# Patient Record
Sex: FEMALE | Race: WHITE | Employment: OTHER | URBAN - METROPOLITAN AREA
[De-identification: names, ages, dates, MRNs, and addresses within clinical notes are randomized per-mention and may not be internally consistent; named-entity substitution may affect disease eponyms.]

---

## 2017-12-26 ENCOUNTER — HOSPITAL ENCOUNTER (OUTPATIENT)
Dept: WOMENS IMAGING | Age: 62
Discharge: HOME | End: 2017-12-26
Payer: COMMERCIAL

## 2017-12-26 DIAGNOSIS — M81.0 AGE-RELATED OSTEOPOROSIS WITHOUT CURRENT PATHOLOGICAL FRACTURE: ICD-10-CM

## 2017-12-26 DIAGNOSIS — Z12.31 VISIT FOR SCREENING MAMMOGRAM: ICD-10-CM

## 2017-12-26 PROCEDURE — 77080 DXA BONE DENSITY AXIAL: CPT

## 2017-12-26 PROCEDURE — 77063 BREAST TOMOSYNTHESIS BI: CPT

## 2018-12-28 ENCOUNTER — HOSPITAL ENCOUNTER (OUTPATIENT)
Dept: WOMENS IMAGING | Age: 63
Discharge: HOME | End: 2018-12-30
Payer: COMMERCIAL

## 2018-12-28 DIAGNOSIS — Z12.31 VISIT FOR SCREENING MAMMOGRAM: ICD-10-CM

## 2018-12-28 PROCEDURE — 77063 BREAST TOMOSYNTHESIS BI: CPT

## 2019-03-26 ENCOUNTER — HOSPITAL ENCOUNTER (OUTPATIENT)
Dept: WOMENS IMAGING | Age: 64
Discharge: HOME | End: 2019-03-28
Payer: COMMERCIAL

## 2019-03-26 DIAGNOSIS — Z78.0 POST-MENOPAUSAL: ICD-10-CM

## 2019-03-26 PROCEDURE — 77080 DXA BONE DENSITY AXIAL: CPT

## 2020-01-06 ENCOUNTER — HOSPITAL ENCOUNTER (OUTPATIENT)
Dept: WOMENS IMAGING | Age: 65
Discharge: HOME | End: 2020-01-08
Payer: COMMERCIAL

## 2020-01-06 PROCEDURE — 77063 BREAST TOMOSYNTHESIS BI: CPT

## 2020-01-13 ENCOUNTER — HOSPITAL ENCOUNTER (OUTPATIENT)
Dept: WOMENS IMAGING | Age: 65
Discharge: HOME | End: 2020-01-15
Payer: COMMERCIAL

## 2020-01-13 PROCEDURE — G0279 TOMOSYNTHESIS, MAMMO: HCPCS

## 2020-07-13 ENCOUNTER — HOSPITAL ENCOUNTER (OUTPATIENT)
Dept: WOMENS IMAGING | Age: 65
Discharge: HOME | End: 2020-07-15
Payer: COMMERCIAL

## 2020-07-13 PROCEDURE — G0279 TOMOSYNTHESIS, MAMMO: HCPCS

## 2021-01-18 ENCOUNTER — HOSPITAL ENCOUNTER (OUTPATIENT)
Dept: WOMENS IMAGING | Age: 66
End: 2021-01-18
Payer: COMMERCIAL

## 2021-01-18 ENCOUNTER — HOSPITAL ENCOUNTER (OUTPATIENT)
Dept: WOMENS IMAGING | Age: 66
Discharge: HOME | End: 2021-01-20
Payer: COMMERCIAL

## 2021-01-18 DIAGNOSIS — R92.8 ABNORMAL MAMMOGRAM: ICD-10-CM

## 2021-01-18 PROCEDURE — G0279 TOMOSYNTHESIS, MAMMO: HCPCS

## 2021-03-15 ENCOUNTER — HOSPITAL ENCOUNTER (OUTPATIENT)
Dept: WOMENS IMAGING | Age: 66
Discharge: HOME | End: 2021-03-17
Payer: COMMERCIAL

## 2021-03-15 DIAGNOSIS — M81.0 OSTEOPOROSIS WITHOUT CURRENT PATHOLOGICAL FRACTURE, UNSPECIFIED OSTEOPOROSIS TYPE: ICD-10-CM

## 2021-03-15 PROCEDURE — 77080 DXA BONE DENSITY AXIAL: CPT

## 2022-02-01 ENCOUNTER — HOSPITAL ENCOUNTER (OUTPATIENT)
Dept: WOMENS IMAGING | Age: 67
Discharge: HOME | End: 2022-02-03
Payer: MEDICARE

## 2022-02-01 DIAGNOSIS — Z12.31 ENCOUNTER FOR SCREENING MAMMOGRAM FOR MALIGNANT NEOPLASM OF BREAST: ICD-10-CM

## 2022-02-01 PROCEDURE — 77063 BREAST TOMOSYNTHESIS BI: CPT

## 2023-02-07 ENCOUNTER — HOSPITAL ENCOUNTER (OUTPATIENT)
Dept: WOMENS IMAGING | Age: 68
Discharge: HOME | End: 2023-02-09
Payer: MEDICARE

## 2023-02-07 DIAGNOSIS — Z12.31 ENCOUNTER FOR SCREENING MAMMOGRAM FOR MALIGNANT NEOPLASM OF BREAST: ICD-10-CM

## 2023-02-07 PROCEDURE — 77067 SCR MAMMO BI INCL CAD: CPT

## 2023-04-25 ENCOUNTER — TRANSCRIBE ORDERS (OUTPATIENT)
Dept: ADMINISTRATIVE | Age: 68
End: 2023-04-25

## 2023-04-25 DIAGNOSIS — M81.0 AGE RELATED OSTEOPOROSIS, UNSPECIFIED PATHOLOGICAL FRACTURE PRESENCE: Primary | ICD-10-CM

## 2023-05-22 ENCOUNTER — HOSPITAL ENCOUNTER (OUTPATIENT)
Dept: WOMENS IMAGING | Age: 68
Discharge: HOME | End: 2023-05-24
Payer: MEDICARE

## 2023-05-22 DIAGNOSIS — M81.0 AGE RELATED OSTEOPOROSIS, UNSPECIFIED PATHOLOGICAL FRACTURE PRESENCE: ICD-10-CM

## 2023-05-22 PROCEDURE — 77080 DXA BONE DENSITY AXIAL: CPT

## 2024-02-08 ENCOUNTER — HOSPITAL ENCOUNTER (OUTPATIENT)
Dept: WOMENS IMAGING | Age: 69
Discharge: HOME | End: 2024-02-10
Payer: MEDICARE

## 2024-02-08 DIAGNOSIS — Z12.31 ENCOUNTER FOR SCREENING MAMMOGRAM FOR BREAST CANCER: ICD-10-CM

## 2024-02-08 PROCEDURE — 77063 BREAST TOMOSYNTHESIS BI: CPT

## 2024-04-07 ENCOUNTER — APPOINTMENT (OUTPATIENT)
Dept: GENERAL RADIOLOGY | Age: 69
End: 2024-04-07
Payer: MEDICARE

## 2024-04-07 ENCOUNTER — HOSPITAL ENCOUNTER (INPATIENT)
Age: 69
LOS: 6 days | Discharge: NURSING, REHAB OR LONG-TERM CARE | End: 2024-04-13
Attending: EMERGENCY MEDICINE | Admitting: SURGERY
Payer: MEDICARE

## 2024-04-07 DIAGNOSIS — S72.92XA CLOSED FRACTURE OF LEFT FEMUR, UNSPECIFIED FRACTURE MORPHOLOGY, UNSPECIFIED PORTION OF FEMUR, INITIAL ENCOUNTER (HCC): Primary | ICD-10-CM

## 2024-04-07 PROBLEM — S72.002A LEFT DISPLACED FEMORAL NECK FRACTURE (HCC): Status: ACTIVE | Noted: 2024-04-07

## 2024-04-07 LAB
ABO + RH BLD: NORMAL
ALBUMIN SERPL-MCNC: 3.9 G/DL (ref 3.5–5.2)
ALBUMIN/GLOB SERPL: 2 {RATIO} (ref 1–2.5)
ALP SERPL-CCNC: 48 U/L (ref 35–104)
ALT SERPL-CCNC: 33 U/L (ref 10–35)
ANION GAP SERPL CALCULATED.3IONS-SCNC: 12 MMOL/L (ref 9–16)
ARM BAND NUMBER: NORMAL
AST SERPL-CCNC: 35 U/L (ref 10–35)
BACTERIA URNS QL MICRO: ABNORMAL
BASOPHILS # BLD: 0.03 K/UL (ref 0–0.2)
BASOPHILS NFR BLD: 0 % (ref 0–2)
BILIRUB SERPL-MCNC: 0.6 MG/DL (ref 0–1.2)
BILIRUB UR QL STRIP: NEGATIVE
BLOOD BANK SAMPLE EXPIRATION: NORMAL
BLOOD GROUP ANTIBODIES SERPL: NEGATIVE
BUN SERPL-MCNC: 26 MG/DL (ref 8–23)
CALCIUM SERPL-MCNC: 9 MG/DL (ref 8.6–10.4)
CASTS #/AREA URNS LPF: ABNORMAL /LPF (ref 0–8)
CHLORIDE SERPL-SCNC: 107 MMOL/L (ref 98–107)
CLARITY UR: CLEAR
CO2 SERPL-SCNC: 22 MMOL/L (ref 20–31)
COLOR UR: YELLOW
CREAT SERPL-MCNC: 1 MG/DL (ref 0.5–0.9)
EOSINOPHIL # BLD: 0.15 K/UL (ref 0–0.44)
EOSINOPHILS RELATIVE PERCENT: 2 % (ref 1–4)
EPI CELLS #/AREA URNS HPF: ABNORMAL /HPF (ref 0–5)
ERYTHROCYTE [DISTWIDTH] IN BLOOD BY AUTOMATED COUNT: 12.6 % (ref 11.8–14.4)
GFR SERPL CREATININE-BSD FRML MDRD: 64 ML/MIN/1.73M2
GLUCOSE SERPL-MCNC: 176 MG/DL (ref 74–99)
GLUCOSE UR STRIP-MCNC: NEGATIVE MG/DL
HCT VFR BLD AUTO: 32.9 % (ref 36.3–47.1)
HGB BLD-MCNC: 10.6 G/DL (ref 11.9–15.1)
HGB UR QL STRIP.AUTO: NEGATIVE
IMM GRANULOCYTES # BLD AUTO: 0.03 K/UL (ref 0–0.3)
IMM GRANULOCYTES NFR BLD: 0 %
INR PPP: 1
KETONES UR STRIP-MCNC: ABNORMAL MG/DL
LEUKOCYTE ESTERASE UR QL STRIP: NEGATIVE
LYMPHOCYTES NFR BLD: 1.94 K/UL (ref 1.1–3.7)
LYMPHOCYTES RELATIVE PERCENT: 25 % (ref 24–43)
MCH RBC QN AUTO: 29.2 PG (ref 25.2–33.5)
MCHC RBC AUTO-ENTMCNC: 32.2 G/DL (ref 28.4–34.8)
MCV RBC AUTO: 90.6 FL (ref 82.6–102.9)
MONOCYTES NFR BLD: 0.6 K/UL (ref 0.1–1.2)
MONOCYTES NFR BLD: 8 % (ref 3–12)
NEUTROPHILS NFR BLD: 65 % (ref 36–65)
NEUTS SEG NFR BLD: 5 K/UL (ref 1.5–8.1)
NITRITE UR QL STRIP: NEGATIVE
NRBC BLD-RTO: 0 PER 100 WBC
PARTIAL THROMBOPLASTIN TIME: <20 SEC (ref 23–36.5)
PH UR STRIP: 6 [PH] (ref 5–8)
PLATELET # BLD AUTO: 157 K/UL (ref 138–453)
PMV BLD AUTO: 11.5 FL (ref 8.1–13.5)
POTASSIUM SERPL-SCNC: 4.4 MMOL/L (ref 3.7–5.3)
PROT SERPL-MCNC: 6.4 G/DL (ref 6.6–8.7)
PROT UR STRIP-MCNC: NEGATIVE MG/DL
PROTHROMBIN TIME: 12.7 SEC (ref 11.7–14.9)
RBC # BLD AUTO: 3.63 M/UL (ref 3.95–5.11)
RBC #/AREA URNS HPF: ABNORMAL /HPF (ref 0–4)
SODIUM SERPL-SCNC: 141 MMOL/L (ref 136–145)
SP GR UR STRIP: 1.03 (ref 1–1.03)
UROBILINOGEN UR STRIP-ACNC: NORMAL EU/DL (ref 0–1)
WBC #/AREA URNS HPF: ABNORMAL /HPF (ref 0–5)
WBC OTHER # BLD: 7.8 K/UL (ref 3.5–11.3)

## 2024-04-07 PROCEDURE — 73560 X-RAY EXAM OF KNEE 1 OR 2: CPT

## 2024-04-07 PROCEDURE — 86850 RBC ANTIBODY SCREEN: CPT

## 2024-04-07 PROCEDURE — 73502 X-RAY EXAM HIP UNI 2-3 VIEWS: CPT

## 2024-04-07 PROCEDURE — 1200000000 HC SEMI PRIVATE

## 2024-04-07 PROCEDURE — 6370000000 HC RX 637 (ALT 250 FOR IP): Performed by: STUDENT IN AN ORGANIZED HEALTH CARE EDUCATION/TRAINING PROGRAM

## 2024-04-07 PROCEDURE — 6360000002 HC RX W HCPCS: Performed by: STUDENT IN AN ORGANIZED HEALTH CARE EDUCATION/TRAINING PROGRAM

## 2024-04-07 PROCEDURE — 71045 X-RAY EXAM CHEST 1 VIEW: CPT

## 2024-04-07 PROCEDURE — 86900 BLOOD TYPING SEROLOGIC ABO: CPT

## 2024-04-07 PROCEDURE — 2500000003 HC RX 250 WO HCPCS

## 2024-04-07 PROCEDURE — 85610 PROTHROMBIN TIME: CPT

## 2024-04-07 PROCEDURE — 96374 THER/PROPH/DIAG INJ IV PUSH: CPT

## 2024-04-07 PROCEDURE — 96375 TX/PRO/DX INJ NEW DRUG ADDON: CPT

## 2024-04-07 PROCEDURE — 80053 COMPREHEN METABOLIC PANEL: CPT

## 2024-04-07 PROCEDURE — 85730 THROMBOPLASTIN TIME PARTIAL: CPT

## 2024-04-07 PROCEDURE — 73552 X-RAY EXAM OF FEMUR 2/>: CPT

## 2024-04-07 PROCEDURE — 2580000003 HC RX 258: Performed by: STUDENT IN AN ORGANIZED HEALTH CARE EDUCATION/TRAINING PROGRAM

## 2024-04-07 PROCEDURE — 86901 BLOOD TYPING SEROLOGIC RH(D): CPT

## 2024-04-07 PROCEDURE — 99285 EMERGENCY DEPT VISIT HI MDM: CPT

## 2024-04-07 PROCEDURE — 85025 COMPLETE CBC W/AUTO DIFF WBC: CPT

## 2024-04-07 PROCEDURE — 81001 URINALYSIS AUTO W/SCOPE: CPT

## 2024-04-07 RX ORDER — OXYCODONE HCL 5 MG/5 ML
5 SOLUTION, ORAL ORAL EVERY 6 HOURS PRN
Status: DISCONTINUED | OUTPATIENT
Start: 2024-04-07 | End: 2024-04-13 | Stop reason: HOSPADM

## 2024-04-07 RX ORDER — SODIUM CHLORIDE 9 MG/ML
INJECTION, SOLUTION INTRAVENOUS PRN
Status: DISCONTINUED | OUTPATIENT
Start: 2024-04-07 | End: 2024-04-10

## 2024-04-07 RX ORDER — MORPHINE SULFATE 4 MG/ML
4 INJECTION, SOLUTION INTRAMUSCULAR; INTRAVENOUS ONCE
Status: COMPLETED | OUTPATIENT
Start: 2024-04-07 | End: 2024-04-07

## 2024-04-07 RX ORDER — ONDANSETRON 4 MG/1
4 TABLET, ORALLY DISINTEGRATING ORAL EVERY 8 HOURS PRN
Status: DISCONTINUED | OUTPATIENT
Start: 2024-04-07 | End: 2024-04-13 | Stop reason: HOSPADM

## 2024-04-07 RX ORDER — LISINOPRIL 20 MG/1
40 TABLET ORAL DAILY
Status: DISCONTINUED | OUTPATIENT
Start: 2024-04-08 | End: 2024-04-10

## 2024-04-07 RX ORDER — OXYCODONE HYDROCHLORIDE 5 MG/1
5 TABLET ORAL EVERY 6 HOURS PRN
Status: DISCONTINUED | OUTPATIENT
Start: 2024-04-07 | End: 2024-04-07

## 2024-04-07 RX ORDER — LEVOTHYROXINE SODIUM 0.1 MG/1
100 TABLET ORAL DAILY
Status: DISCONTINUED | OUTPATIENT
Start: 2024-04-08 | End: 2024-04-13 | Stop reason: HOSPADM

## 2024-04-07 RX ORDER — FENTANYL CITRATE 50 UG/ML
50 INJECTION, SOLUTION INTRAMUSCULAR; INTRAVENOUS ONCE
Status: DISCONTINUED | OUTPATIENT
Start: 2024-04-07 | End: 2024-04-07

## 2024-04-07 RX ORDER — ACETAMINOPHEN 500 MG
1000 TABLET ORAL EVERY 8 HOURS SCHEDULED
Status: DISCONTINUED | OUTPATIENT
Start: 2024-04-07 | End: 2024-04-07

## 2024-04-07 RX ORDER — SODIUM CHLORIDE 0.9 % (FLUSH) 0.9 %
5-40 SYRINGE (ML) INJECTION PRN
Status: DISCONTINUED | OUTPATIENT
Start: 2024-04-07 | End: 2024-04-10

## 2024-04-07 RX ORDER — NITROGLYCERIN 0.4 MG/1
0.4 TABLET SUBLINGUAL EVERY 5 MIN PRN
Status: ON HOLD | COMMUNITY

## 2024-04-07 RX ORDER — LEVOTHYROXINE SODIUM 0.1 MG/1
100 TABLET ORAL DAILY
Status: ON HOLD | COMMUNITY
Start: 2016-09-20

## 2024-04-07 RX ORDER — SIMVASTATIN 10 MG
10 TABLET ORAL NIGHTLY
Status: ON HOLD | COMMUNITY
Start: 2012-11-06

## 2024-04-07 RX ORDER — SODIUM CHLORIDE, SODIUM LACTATE, POTASSIUM CHLORIDE, CALCIUM CHLORIDE 600; 310; 30; 20 MG/100ML; MG/100ML; MG/100ML; MG/100ML
INJECTION, SOLUTION INTRAVENOUS CONTINUOUS
Status: DISCONTINUED | OUTPATIENT
Start: 2024-04-08 | End: 2024-04-08

## 2024-04-07 RX ORDER — GABAPENTIN 300 MG/1
300 CAPSULE ORAL 2 TIMES DAILY
Status: ON HOLD | COMMUNITY

## 2024-04-07 RX ORDER — ACETAMINOPHEN 325 MG/1
650 TABLET ORAL EVERY 6 HOURS PRN
Status: ON HOLD | COMMUNITY

## 2024-04-07 RX ORDER — LISINOPRIL 40 MG/1
40 TABLET ORAL DAILY
Status: ON HOLD | COMMUNITY
Start: 2023-01-03

## 2024-04-07 RX ORDER — SODIUM CHLORIDE 0.9 % (FLUSH) 0.9 %
5-40 SYRINGE (ML) INJECTION EVERY 12 HOURS SCHEDULED
Status: DISCONTINUED | OUTPATIENT
Start: 2024-04-07 | End: 2024-04-10

## 2024-04-07 RX ORDER — ATORVASTATIN CALCIUM 20 MG/1
10 TABLET, FILM COATED ORAL NIGHTLY
Status: DISCONTINUED | OUTPATIENT
Start: 2024-04-07 | End: 2024-04-13 | Stop reason: HOSPADM

## 2024-04-07 RX ORDER — ACETAMINOPHEN 160 MG/5ML
1000 LIQUID ORAL EVERY 8 HOURS SCHEDULED
Status: DISCONTINUED | OUTPATIENT
Start: 2024-04-07 | End: 2024-04-08

## 2024-04-07 RX ORDER — LIDOCAINE HYDROCHLORIDE 10 MG/ML
20 INJECTION, SOLUTION INFILTRATION; PERINEURAL ONCE
Status: COMPLETED | OUTPATIENT
Start: 2024-04-07 | End: 2024-04-07

## 2024-04-07 RX ORDER — GABAPENTIN 300 MG/1
300 CAPSULE ORAL EVERY 8 HOURS SCHEDULED
Status: DISCONTINUED | OUTPATIENT
Start: 2024-04-07 | End: 2024-04-09

## 2024-04-07 RX ORDER — ONDANSETRON 2 MG/ML
4 INJECTION INTRAMUSCULAR; INTRAVENOUS EVERY 6 HOURS PRN
Status: DISCONTINUED | OUTPATIENT
Start: 2024-04-07 | End: 2024-04-13 | Stop reason: HOSPADM

## 2024-04-07 RX ORDER — ALENDRONATE SODIUM 70 MG/1
TABLET ORAL DAILY
Status: ON HOLD | COMMUNITY
Start: 2012-11-06

## 2024-04-07 RX ORDER — LANOLIN ALCOHOL/MO/W.PET/CERES
1000 CREAM (GRAM) TOPICAL DAILY
Status: ON HOLD | COMMUNITY

## 2024-04-07 RX ORDER — POLYETHYLENE GLYCOL 3350 17 G/17G
17 POWDER, FOR SOLUTION ORAL DAILY
Status: DISCONTINUED | OUTPATIENT
Start: 2024-04-07 | End: 2024-04-13 | Stop reason: HOSPADM

## 2024-04-07 RX ORDER — ENOXAPARIN SODIUM 100 MG/ML
40 INJECTION SUBCUTANEOUS 2 TIMES DAILY
Status: DISCONTINUED | OUTPATIENT
Start: 2024-04-08 | End: 2024-04-09

## 2024-04-07 RX ORDER — ASPIRIN 81 MG/1
81 TABLET, CHEWABLE ORAL DAILY
Status: ON HOLD | COMMUNITY
End: 2024-04-10 | Stop reason: HOSPADM

## 2024-04-07 RX ORDER — FENTANYL CITRATE 50 UG/ML
50 INJECTION, SOLUTION INTRAMUSCULAR; INTRAVENOUS ONCE
Status: COMPLETED | OUTPATIENT
Start: 2024-04-07 | End: 2024-04-07

## 2024-04-07 RX ADMIN — LIDOCAINE HYDROCHLORIDE 20 ML: 10 INJECTION, SOLUTION INFILTRATION; PERINEURAL at 18:24

## 2024-04-07 RX ADMIN — HYDROMORPHONE HYDROCHLORIDE 0.5 MG: 1 INJECTION, SOLUTION INTRAMUSCULAR; INTRAVENOUS; SUBCUTANEOUS at 17:00

## 2024-04-07 RX ADMIN — ATORVASTATIN CALCIUM 10 MG: 20 TABLET, FILM COATED ORAL at 21:11

## 2024-04-07 RX ADMIN — GABAPENTIN 300 MG: 300 CAPSULE ORAL at 21:11

## 2024-04-07 RX ADMIN — SODIUM CHLORIDE, PRESERVATIVE FREE 10 ML: 5 INJECTION INTRAVENOUS at 21:50

## 2024-04-07 RX ADMIN — ACETAMINOPHEN 1000 MG: 650 SOLUTION ORAL at 17:57

## 2024-04-07 RX ADMIN — OXYCODONE HYDROCHLORIDE 5 MG: 5 SOLUTION ORAL at 17:57

## 2024-04-07 RX ADMIN — HYDROMORPHONE HYDROCHLORIDE 0.5 MG: 1 INJECTION, SOLUTION INTRAMUSCULAR; INTRAVENOUS; SUBCUTANEOUS at 21:50

## 2024-04-07 RX ADMIN — MORPHINE SULFATE 4 MG: 4 INJECTION, SOLUTION INTRAMUSCULAR; INTRAVENOUS at 15:00

## 2024-04-07 RX ADMIN — FENTANYL CITRATE 50 MCG: 50 INJECTION INTRAMUSCULAR; INTRAVENOUS at 15:04

## 2024-04-07 ASSESSMENT — PAIN DESCRIPTION - ORIENTATION
ORIENTATION: LEFT

## 2024-04-07 ASSESSMENT — PAIN - FUNCTIONAL ASSESSMENT: PAIN_FUNCTIONAL_ASSESSMENT: 0-10

## 2024-04-07 ASSESSMENT — PAIN DESCRIPTION - DESCRIPTORS
DESCRIPTORS: ACHING
DESCRIPTORS: ACHING;DISCOMFORT
DESCRIPTORS: ACHING
DESCRIPTORS: ACHING

## 2024-04-07 ASSESSMENT — PAIN SCALES - GENERAL
PAINLEVEL_OUTOF10: 7
PAINLEVEL_OUTOF10: 4
PAINLEVEL_OUTOF10: 7
PAINLEVEL_OUTOF10: 6
PAINLEVEL_OUTOF10: 3
PAINLEVEL_OUTOF10: 6
PAINLEVEL_OUTOF10: 6

## 2024-04-07 ASSESSMENT — PAIN DESCRIPTION - LOCATION
LOCATION: HIP

## 2024-04-07 NOTE — ED PROVIDER NOTES
[facility]  Emergency Department  Faculty Attestation     I performed a history and physical examination of the patient and discussed management with the resident. I reviewed the resident’s note and agree with the documented findings and plan of care. Any areas of disagreement are noted on the chart. I was personally present for the key portions of any procedures. I have documented in the chart those procedures where I was not present during the key portions. I have reviewed the emergency nurses triage note. I agree with the chief complaint, past medical history, past surgical history, allergies, medications, social and family history as documented unless otherwise noted below.    For Physician Assistant/ Nurse Practitioner cases/documentation I have personally evaluated this patient and have completed at least one if not all key elements of the E/M (history, physical exam, and MDM). Additional findings are as noted.    Preliminary note started at 2:39 PM EDT    Primary Care Physician:  Kathryn Ortiz APRN - CNP    Screenings:  [unfilled]    CHIEF COMPLAINT       Chief Complaint   Patient presents with    Fall    Dislocation       RECENT VITALS:   There were no vitals taken for this visit.    LABS:  Labs Reviewed - No data to display    Radiology  No orders to display       CRITICAL CARE: There was a high probability of clinically significant/life threatening deterioration in this patient's condition which required my urgent intervention.  Total critical care time was none minutes.  This excludes any time for separately reportable procedures.     EKG:    Attending Physician Additional  Notes    Patient is brought by EMS after a fall and left hip deformity and inability to ambulate.  She actually fell on the floor playing hockey with a grandchild in the basement.  No head injury neck pain numbness weakness paresthesias loss of consciousness bleeding or laceration.

## 2024-04-07 NOTE — PROGRESS NOTES
Low risk for orthopedic surgery    Марина Wolf MD  PGY-4 General Surgery  5:53 PM 04/07/24

## 2024-04-07 NOTE — ED PROVIDER NOTES
[facility] ED  Emergency Department Encounter  Emergency Medicine Resident     Pt Name:Dariana Juan  MRN: [id number]  Birthdate 1955  Date of evaluation: 4/7/24  PCP:  Kathryn Ortiz APRN - CNP  Note Started: 3:04 PM EDT      CHIEF COMPLAINT       Chief Complaint   Patient presents with    Fall    Dislocation       HISTORY OF PRESENT ILLNESS  (Location/Symptom, Timing/Onset, Context/Setting, Quality, Duration, Modifying Factors, Severity.)      Dariana Juan is a 68 y.o. female who presents with right leg pain deformity.  Patient was playing hockey with her grandson when she fell on her right hip.  Get her left foot caught.  Was found to have severe deformity of her left hip.  Having severe pain.  Denies any numbness or tingling but has a history of nephropathy.  Denies any blood thinner use.  Denies striking her head or losing conscious.  Is on complaint of severe left leg pain.  Is allergic to penicillin as well as IV contrast.    PAST MEDICAL / SURGICAL / SOCIAL / FAMILY HISTORY      has a past medical history of BRCA1 gene mutation positive and BRCA2 gene mutation positive.       has a past surgical history that includes Breast biopsy.      Social History     Socioeconomic History    Marital status:      Spouse name: Not on file    Number of children: Not on file    Years of education: Not on file    Highest education level: Not on file   Occupational History    Not on file   Tobacco Use    Smoking status: Not on file    Smokeless tobacco: Not on file   Substance and Sexual Activity    Alcohol use: Not on file    Drug use: Not on file    Sexual activity: Not on file   Other Topics Concern    Not on file   Social History Narrative    Not on file     Social Determinants of Health     Financial Resource Strain: Not on file   Food Insecurity: Not on file   Transportation Needs: Not on file   Physical Activity: Not on file   Stress: Not on file   Social Connections: Not on

## 2024-04-07 NOTE — ED PROVIDER NOTES
FACULTY SIGN-OUT  ADDENDUM     Care of this patient was assumed from previous attending physician. The patient's initial evaluation and plan have been discussed with the prior provider who initially evaluated the patient.      Note Started: 3:17 PM EDT    Attestation  I was available and discussed any additional care issues that arose and coordinated the management plans with the resident(s) caring for the patient during my duty period. Any areas of disagreement with resident's documentation of care or procedures are noted on the chart. I was personally present for the key portions of any/all procedures, during my duty period. I have documented in the chart those procedures where I was not present during the key portions.       ED COURSE      The patient was given the following medications:  Orders Placed This Encounter   Medications    DISCONTD: fentaNYL (SUBLIMAZE) injection 50 mcg    morphine injection 4 mg    fentaNYL (SUBLIMAZE) injection 50 mcg       RECENT VITALS:   Temp: 97.9 °F (36.6 °C), Pulse: 82, Respirations: 12, BP: 135/77    MEDICAL DECISION MAKING        Dariana Juan is a 68 y.o. female who presents to the Emergency Department with complaints of fall. LE deformity. Await xrays and ortho consult.      Kortney Saucedo MD  Attending Emergency Physician    (Please note that portions of this note were completed with a voice recognition program.  Efforts were made to edit the dictations but occasionally words are mis-transcribed.)

## 2024-04-07 NOTE — CONSULTS
nontender to palpation.  EHL/FHL/TA/GS complex motor intact.  Baseline neuropathy, otherwise, sural/saphenous/SPN/DPN/plantar nerve distribution SILT.  DP and PT pulses 2+ with BCR, toes are warm and well perfused.     Labs:  Recent Labs     04/07/24  1444   WBC 7.8   HGB 10.6*   HCT 32.9*      INR 1.0      K 4.4   BUN 26*   CREATININE 1.0*   GLUCOSE 176*        Imaging:   Films of the left hip, and femur demonstrating a left subtrochanteric femur fracture.  There is evidence of this being a bisphosphonate injury with lateral cortical thickening and medial cortical spike.    Assessment: 68 y.o. female who FFSH while playing hockey, being seen for:    -Left subtrochanteric femur fracture  -Bisphosphonate related fracture    Plan:    - Plan for OR with Dr. Caruso tomorrow, 4/8/2024.  Appreciate restratification from primary team.  - Nonweightbearing left lower extremity.  - Distal femur placed into skeletal traction.  Please see traction care instructions below  - Diet: Please keep NPO at midnight in anticipation for OR   - Abx: Ancef OCTOR  - DVT ppx: Please hold chemical AC in anticipation for surgery  - Consent in chart/surgical site marked  - F/u VitD level  - Pain control and medical management per primary   - Ice extremity for pain and swelling   - Please contact Ortho on call with any questions    Skeletal Traction Care: Always ensure the rope/tension bow is not resting directly against the patient's skin. Reposition or pad the area with fluffs/abds/etc as needed to prevent skin breakdown.The limb should be directly in line with the pull of the traction. Ok to remove weights temporarily for transfer/repositioning but always reapply. Ensure that weight are not resting on edge of bed or floor. Ortho team will manage pin sites      Procedure note: placement of femoral traction    Risks (infection, pain), benefits (pain relief and joint stability), and alternatives (continued abduction pillow) of

## 2024-04-07 NOTE — PROGRESS NOTES
[facility]     Emergency/Trauma Note    PATIENT NAME: Dariana Juan    Shift date: 04/07/2024  Shift day: Sunday   Shift # 1    Room # 14/14     Name: Dariana Juan            Age: 68 y.o.  Gender: female          Orthodox: Church   Place of Alevism:     Trauma/Incident type: Adult Trauma Consult  Admit Date & Time: 4/7/2024  2:37 PM  TRAUMA NAME: None    ADVANCE DIRECTIVES IN CHART?  No    NAME OF DECISION MAKER:     RELATIONSHIP OF DECISION MAKER TO PATIENT:     PATIENT/EVENT DESCRIPTION:  Dariana Juan is a 68 y.o. female who arrived via ground ambulance as adult trauma consult. Patient was conscious and responsive. Patient said he was playing hockey with her grandchild when she fell. Patient said it happened in the basement of their house. Patient to be admitted to 14/14.       SPIRITUAL ASSESSMENT-INTERVENTION-OUTCOME:  Patient was raised Church and very receptive to spiritual care. Family  was present at the time. Patient remained hopeful and seemed to have confidence in the medical staff.  provided ministry of presence, offered support and prayed with patient and family. Patient and family expressed gratitude for the spiritual and emotional support they received.      PATIENT BELONGINGS:  No belongings noted    ANY BELONGINGS OF SIGNIFICANT VALUE NOTED:  Unknown    REGISTRATION STAFF NOTIFIED?  Yes    WHAT IS YOUR SPIRITUAL CARE PLAN FOR THIS PATIENT?:  Chaplains would continue to remain available for more prayers and support as needed.     Electronically signed by Chaplain Eamon, on 4/7/2024 at 3:32 PM.  [facility]  [phone]

## 2024-04-07 NOTE — H&P
TRAUMA H&P/CONSULT    PATIENT NAME: Dariana Juan  YOB: 1955  MEDICAL RECORD NO. [id number]   DATE: 4/7/2024  PRIMARY CARE PHYSICIAN: Kathryn Ortiz, NOHEMY - CNP  PATIENT EVALUATED AT THE REQUEST OF DR.: Dr. Demarcus FRANKS   Trauma Consult-Time at bedside 3:15 pm    Patient Active Problem List   Diagnosis    Left displaced femoral neck fracture (HCC)       IMPRESSION AND PLAN:   Fall from standing height leading to deformity and subtrochanteric fracture of the left femur.    Diagnosis: Left subtrochanteric fracture   Plan: Orthopedics referral   Multimodal pain control   Admit patient   Monitor vitals    Diagnosis: History of hypertension and hypothyroidism   Plan: Monitor vitals   Will give levothyroxine tomorrow morning    If intracranial hemorrhage is present, is it a:  [] BIG 1  [] BIG 2  [] BIG 3  If chest wall injury: Rib score___    CONSULT SERVICES    Orthopedic Surgery      HISTORY:     Chief Complaint:  \"Fall from standing height, pain and deformity in the left lower extremity\"    GENERAL DATA  Patient information was obtained from patient.  History/Exam limitations: none.  Injury Date: 4/7/2024    Approximate Injury Time: 1.00 pm  Attending: Dr. Black    HPI: Review of systems done.  Positive and negative findings are listed in HPI medical history    PMH: Hypertension, hypothyroidism (postsurgical), neuropathy  PSH: Cholecystectomy, thyroidectomy:        Transport mode: Ambulance  Referring Hospital: None    SETTING OF TRAUMATIC EVENT   Location : Home  Specific Details of Location: Other: [place]    MECHANISM OF INJURY    Fall From standing      HISTORY:     Dariana Juan is a 68-year-old female who was playing indoor hockey with her grandson in the basement when she slipped and fell from standing height.  Patient had acute pain in the left lower extremity and was not able to stand up.  There was no loss of consciousness.  There was no bleeding at the site of injury.   Attending Attestation      I have reviewed the above GCS note(s) and confirmed the key elements of the medical history and physical exam. I have seen and examined the pt.  I have discussed the findings, established the care plan and recommendations with Resident.      Sabas Black DO  4/17/2024  2:12 PM

## 2024-04-07 NOTE — ED PROVIDER NOTES
[facility]  Emergency Department  Emergency Medicine Resident Sign-out     Care of Dariana Juan was assumed from Dr. hatfield and is being seen for Fall and Dislocation  .  The patient's initial evaluation and plan have been discussed with the prior provider who initially evaluated the patient.     EMERGENCY DEPARTMENT COURSE / MEDICAL DECISION MAKING:       MEDICATIONS GIVEN:  Orders Placed This Encounter   Medications    DISCONTD: fentaNYL (SUBLIMAZE) injection 50 mcg    morphine injection 4 mg    fentaNYL (SUBLIMAZE) injection 50 mcg    sodium chloride flush 0.9 % injection 5-40 mL    sodium chloride flush 0.9 % injection 5-40 mL    0.9 % sodium chloride infusion    OR Linked Order Group     ondansetron (ZOFRAN-ODT) disintegrating tablet 4 mg     ondansetron (ZOFRAN) injection 4 mg    polyethylene glycol (GLYCOLAX) packet 17 g    lactated ringers IV soln infusion    enoxaparin (LOVENOX) injection 40 mg     Order Specific Question:   Indication of Use     Answer:   Prophylaxis-DVT/PE    DISCONTD: acetaminophen (TYLENOL) tablet 1,000 mg    DISCONTD: oxyCODONE (ROXICODONE) immediate release tablet 5 mg    HYDROmorphone (DILAUDID) injection 0.5 mg    gabapentin (NEURONTIN) capsule 300 mg    lisinopril (PRINIVIL;ZESTRIL) tablet 40 mg    levothyroxine (SYNTHROID) tablet 100 mcg    atorvastatin (LIPITOR) tablet 10 mg    acetaminophen (TYLENOL) 160 MG/5ML solution 1,000 mg    oxyCODONE (ROXICODONE) 5 MG/5ML solution 5 mg    lidocaine 1 % injection 20 mL    DISCONTD: ceFAZolin (ANCEF) 2000 mg in sterile water 20 mL IV syringe     Order Specific Question:   Antimicrobial Indications     Answer:   Surgical Prophylaxis    ceFAZolin (ANCEF) 2000 mg in sterile water 20 mL IV syringe     Order Specific Question:   Antimicrobial Indications     Answer:   Surgical Prophylaxis       LABS / RADIOLOGY:     Labs Reviewed   CBC WITH AUTO DIFFERENTIAL - Abnormal; Notable for the following components:       Result Value     RBC 3.63 (*)     Hemoglobin 10.6 (*)     Hematocrit 32.9 (*)     All other components within normal limits   COMPREHENSIVE METABOLIC PANEL - Abnormal; Notable for the following components:    Glucose 176 (*)     BUN 26 (*)     Creatinine 1.0 (*)     Total Protein 6.4 (*)     All other components within normal limits   APTT - Abnormal; Notable for the following components:    APTT <20.0 (*)     All other components within normal limits   URINALYSIS WITH MICROSCOPIC - Abnormal; Notable for the following components:    Ketones, Urine TRACE (*)     All other components within normal limits   PROTIME-INR   BASIC METABOLIC PANEL W/ REFLEX TO MG FOR LOW K   CBC WITH AUTO DIFFERENTIAL   ALBUMIN   HEMOGLOBIN A1C   T4, FREE   TSH   VITAMIN D 25 HYDROXY   VITAMIN B12   TYPE AND SCREEN       XR FEMUR LEFT (MIN 2 VIEWS)    Result Date: 4/7/2024  1. Acute, displaced and angulated fracture involving the proximal 3rd of the left femoral diaphysis. 2. Mild degenerative changes involving the bilateral hips as well as the left knee.  Joint alignment is maintained.     XR HIP 2-3 VW W PELVIS LEFT    Result Date: 4/7/2024  1. Acute, displaced and angulated fracture involving the proximal 3rd of the left femoral diaphysis. 2. Mild degenerative changes involving the bilateral hips as well as the left knee.  Joint alignment is maintained.       RECENT VITALS:     Temp: 98.2 °F (36.8 °C),  Pulse: 80, Respirations: 14, BP: 107/69, SpO2: 99 %    This patient is a 68 y.o. Female with   ED Course as of 04/08/24 0111   Sun Apr 07, 2024   1549 68 female.  Fracture obvious left femur fracture.  Neuro intact.  Pulses are present. . And present orthopedic surgery trauma evaluated patient.  Patient will be admitted to trauma service.  Analgesics in the emergency department.  Bony [LL]      ED Course User Index  [LL] Dionne Fernandez MD       OUTSTANDING TASKS / RECOMMENDATIONS:    admission     FINAL IMPRESSION:     1. Closed fracture of left femur,  left upper arm

## 2024-04-07 NOTE — ED NOTES
ED to inpatient nurses report      Chief Complaint:  Chief Complaint   Patient presents with    Fall    Dislocation     Present to ED from: Home    MOA:     LOC: alert and orientated to name, place, date  Mobility: Requires assistance * 2  Oxygen Baseline: %    Current needs required: Ortho consult/ pain medication   Pending ED orders: N/A  Present condition: Stable    Why did the patient come to the ED? Compound fracture to the left femur   What is the plan? Admit to inpatient  Any procedures or intervention occur? N/A  Any safety concerns?? FALL RISK     Mental Status:  Level of Consciousness: Alert (0)    Psych Assessment:   Psychosocial  Psychosocial (WDL): Within Defined Limits  Vital signs   Vitals:    04/07/24 1627 04/07/24 1642 04/07/24 1657 04/07/24 1712   BP: 119/83 91/73 107/68 117/72   Pulse: 72 66 70 88   Resp: 11 13 14 16   Temp:       TempSrc:       SpO2: 100% 100% 100% 97%        Vitals:  Patient Vitals for the past 24 hrs:   BP Temp Temp src Pulse Resp SpO2   04/07/24 1712 117/72 -- -- 88 16 97 %   04/07/24 1657 107/68 -- -- 70 14 100 %   04/07/24 1642 91/73 -- -- 66 13 100 %   04/07/24 1627 119/83 -- -- 72 11 100 %   04/07/24 1612 119/68 -- -- 83 15 99 %   04/07/24 1557 115/64 -- -- 81 18 100 %   04/07/24 1542 95/75 -- -- 73 11 100 %   04/07/24 1438 135/77 97.9 °F (36.6 °C) Oral 82 12 99 %      Visit Vitals  /72   Pulse 88   Temp 97.9 °F (36.6 °C) (Oral)   Resp 16   SpO2 97%        LDAs:   Peripheral IV 04/07/24 Left;Anterior Forearm (Active)       Ambulatory Status:  Presents to emergency department  because of falls (Syncope, seizure, or loss of consciousness): Yes, Age > 70: No, Altered Mental Status, Intoxication with alcohol or substance confusion (Disorientation, impaired judgment, poor safety awaremess, or inability to follow instructions): No, Impaired Mobility: Ambulates or transfers with assistive devices or assistance; Unable to ambulate or transer.: Yes, Nursing Judgement:  Yes    Diagnosis:  DISPOSITION Admitted 04/07/2024 03:58:32 PM   Final diagnoses:   Closed fracture of left femur, unspecified fracture morphology, unspecified portion of femur, initial encounter (HCC)        Consults:  IP CONSULT TO ORTHOPEDIC SURGERY  IP CONSULT TO TRAUMA SURGERY     Treatment Team:   Treatment Team: Attending Provider: Sabas Black DO; Registered Nurse: Luke Martinez, RN; Consulting Physician: Patricio Caruso DO; Consulting Physician: Sabas Black DO; Resident: Dionne Fernandez MD    Treatment:  ED Course as of 04/07/24 1727   Sun Apr 07, 2024   1549 68 female.  Fracture obvious left femur fracture.  Neuro intact.  Pulses are present. . And present orthopedic surgery trauma evaluated patient.  Patient will be admitted to trauma service.  Analgesics in the emergency department.  Bony [LL]      ED Course User Index  [LL] Dionne Fernandez MD          Skin Assessment:        Pain Score:  Pain Assessment  Pain Assessment: 0-10  Pain Level: 6  Pain Location: Hip  Pain Orientation: Left  Pain Descriptors: Aching      SOCIAL HISTORY       Social History     Socioeconomic History    Marital status:        FAMILY HISTORY       Family History   Problem Relation Age of Onset    Breast Cancer Mother 64    Breast Cancer Maternal Grandmother 70    Breast Cancer Paternal Grandmother 70       ALLERGIES     Iodinated contrast media and Penicillins    CURRENT MEDICATIONS       Previous Medications    ACETAMINOPHEN (TYLENOL) 325 MG TABLET    Take 2 tablets by mouth every 6 hours as needed for Pain    ALENDRONATE (FOSAMAX) 70 MG TABLET    Take by mouth Daily    ASPIRIN 81 MG CHEWABLE TABLET    Take 1 tablet by mouth daily    CALCIUM CARB-CHOLECALCIFEROL (CALTRATE 600+D3 PO)    Take 1 tablet by mouth daily    GABAPENTIN (NEURONTIN) 300 MG CAPSULE    Take 1 capsule by mouth in the morning and at bedtime.    LEVOTHYROXINE (SYNTHROID) 100 MCG TABLET    Take 1 tablet by mouth daily    LISINOPRIL

## 2024-04-07 NOTE — PROGRESS NOTES
C- Spine Evaluation for Spine Clearance:    Pt is a 68 y.o. female who was admitted on 04/07/24 s/p fall.   Pt w/ complaints of left leg pain.      No cervical tenderness on exam. Cervical spine cleared based on clinical status.    C-spine is considered cleared w/out need for further imaging, evaluation, or continuation of c-collar.  TLS considered clear w/out need for further imagine, evaluation, or continuation of supine bedrest precautions.    Electronically signed by Марина Wolf MD on 4/7/2024 at 5:53 PM

## 2024-04-07 NOTE — ED TRIAGE NOTES
Pt arrives to Ed rm 14 via EMS with c/o left femur fracture. Pt was playing hockey in the basement with her grandchild when she fell on her right side and ended up with a compound fracture of the right femur. Pt states she has diagnosed osteoporosis in the affected extremity. Pt received 100 mcg of fentanyl PTA. Pt states pain at a 9.5/10. Pt is resting in bed with personal items and call light within reach. Monitor attached. Pt leg raised onto a pillow for comfort. No other complaints at this time. Will continue to monitor.

## 2024-04-07 NOTE — PROGRESS NOTES
Trauma Tertiary Survey    Admit Date: 4/7/2024  Hospital day 0      Subjective:     Patient admitted after a fall from standing height with a left femur fracture no other complaints at this time.    Objective:   PHYSICAL EXAM:   Physical Exam  Constitutional:       General: She is not in acute distress.     Appearance: She is not ill-appearing.   HENT:      Right Ear: External ear normal.      Left Ear: External ear normal.      Mouth/Throat:      Pharynx: Oropharynx is clear.   Eyes:      Extraocular Movements: Extraocular movements intact.      Pupils: Pupils are equal, round, and reactive to light.   Cardiovascular:      Rate and Rhythm: Normal rate and regular rhythm.      Pulses: Normal pulses.   Pulmonary:      Effort: Pulmonary effort is normal.      Breath sounds: No stridor.   Chest:      Chest wall: No tenderness.   Abdominal:      General: There is no distension.      Palpations: Abdomen is soft.      Tenderness: There is no abdominal tenderness.   Musculoskeletal:         General: Swelling, tenderness, deformity and signs of injury present.      Cervical back: Normal range of motion. No tenderness.      Comments: Left lower extremity deformity at the thigh, internally rotated left lower leg   Skin:     Capillary Refill: Capillary refill takes less than 2 seconds.   Neurological:      General: No focal deficit present.      Mental Status: She is alert and oriented to person, place, and time.   Psychiatric:         Mood and Affect: Mood normal.           Spine:     Spine Tenderness ROM   Cervical 0 /10 Normal   Thoracic 0 /10 Normal   Lumbar 0 /10 Normal     Musculoskeletal    Joint Tenderness Swelling ROM   Right shoulder absent absent normal   Left shoulder absent absent normal   Right elbow absent absent normal   Left elbow absent absent normal   Right wrist absent absent normal   Left wrist absent absent normal   Right hand grasp absent absent normal   Left hand grasp absent absent normal   Right hip

## 2024-04-08 ENCOUNTER — APPOINTMENT (OUTPATIENT)
Dept: GENERAL RADIOLOGY | Age: 69
End: 2024-04-08
Payer: MEDICARE

## 2024-04-08 ENCOUNTER — ANESTHESIA (OUTPATIENT)
Dept: OPERATING ROOM | Age: 69
End: 2024-04-08
Payer: MEDICARE

## 2024-04-08 ENCOUNTER — ANESTHESIA EVENT (OUTPATIENT)
Dept: OPERATING ROOM | Age: 69
End: 2024-04-08
Payer: MEDICARE

## 2024-04-08 PROBLEM — S72.92XA CLOSED FRACTURE OF LEFT FEMUR (HCC): Status: ACTIVE | Noted: 2024-04-08

## 2024-04-08 LAB
25(OH)D3 SERPL-MCNC: 52.9 NG/ML (ref 30–100)
ALBUMIN SERPL-MCNC: 4.2 G/DL (ref 3.5–5.2)
ANION GAP SERPL CALCULATED.3IONS-SCNC: 10 MMOL/L (ref 9–16)
BASOPHILS # BLD: 0 K/UL (ref 0–0.2)
BASOPHILS NFR BLD: 0 % (ref 0–2)
BUN SERPL-MCNC: 21 MG/DL (ref 8–23)
CALCIUM SERPL-MCNC: 8.3 MG/DL (ref 8.6–10.4)
CHLORIDE SERPL-SCNC: 105 MMOL/L (ref 98–107)
CO2 SERPL-SCNC: 23 MMOL/L (ref 20–31)
CREAT SERPL-MCNC: 1 MG/DL (ref 0.5–0.9)
EOSINOPHIL # BLD: 0 K/UL (ref 0–0.4)
EOSINOPHILS RELATIVE PERCENT: 0 % (ref 1–4)
ERYTHROCYTE [DISTWIDTH] IN BLOOD BY AUTOMATED COUNT: 12.8 % (ref 11.8–14.4)
EST. AVERAGE GLUCOSE BLD GHB EST-MCNC: 140 MG/DL
GFR SERPL CREATININE-BSD FRML MDRD: 66 ML/MIN/1.73M2
GLUCOSE SERPL-MCNC: 250 MG/DL (ref 74–99)
HBA1C MFR BLD: 6.5 % (ref 4–6)
HCT VFR BLD AUTO: 29.2 % (ref 36.3–47.1)
HCT VFR BLD AUTO: 29.9 % (ref 36.3–47.1)
HGB BLD-MCNC: 9.3 G/DL (ref 11.9–15.1)
HGB BLD-MCNC: 9.3 G/DL (ref 11.9–15.1)
IMM GRANULOCYTES # BLD AUTO: 0.1 K/UL (ref 0–0.3)
IMM GRANULOCYTES NFR BLD: 1 %
LYMPHOCYTES NFR BLD: 0.49 K/UL (ref 1–4.8)
LYMPHOCYTES RELATIVE PERCENT: 5 % (ref 24–44)
MCH RBC QN AUTO: 29 PG (ref 25.2–33.5)
MCHC RBC AUTO-ENTMCNC: 31.1 G/DL (ref 28.4–34.8)
MCV RBC AUTO: 93.1 FL (ref 82.6–102.9)
MONOCYTES NFR BLD: 0.29 K/UL (ref 0.1–0.8)
MONOCYTES NFR BLD: 3 % (ref 1–7)
MORPHOLOGY: NORMAL
NEUTROPHILS NFR BLD: 91 % (ref 36–66)
NEUTS SEG NFR BLD: 8.82 K/UL (ref 1.8–7.7)
NRBC BLD-RTO: 0 PER 100 WBC
PLATELET # BLD AUTO: 126 K/UL (ref 138–453)
PMV BLD AUTO: 12.3 FL (ref 8.1–13.5)
POTASSIUM SERPL-SCNC: 4.4 MMOL/L (ref 3.7–5.3)
RBC # BLD AUTO: 3.21 M/UL (ref 3.95–5.11)
SODIUM SERPL-SCNC: 138 MMOL/L (ref 136–145)
T4 FREE SERPL-MCNC: 1.2 NG/DL (ref 0.92–1.68)
TSH SERPL DL<=0.05 MIU/L-ACNC: 0.62 UIU/ML (ref 0.27–4.2)
VIT B12 SERPL-MCNC: 1573 PG/ML (ref 232–1245)
WBC OTHER # BLD: 9.7 K/UL (ref 3.5–11.3)

## 2024-04-08 PROCEDURE — 2W5PX0Z REMOVAL OF TRACTION APPARATUS ON LEFT UPPER LEG: ICD-10-PCS | Performed by: ORTHOPAEDIC SURGERY

## 2024-04-08 PROCEDURE — 7100000001 HC PACU RECOVERY - ADDTL 15 MIN: Performed by: ORTHOPAEDIC SURGERY

## 2024-04-08 PROCEDURE — 2580000003 HC RX 258

## 2024-04-08 PROCEDURE — 6370000000 HC RX 637 (ALT 250 FOR IP): Performed by: STUDENT IN AN ORGANIZED HEALTH CARE EDUCATION/TRAINING PROGRAM

## 2024-04-08 PROCEDURE — 85025 COMPLETE CBC W/AUTO DIFF WBC: CPT

## 2024-04-08 PROCEDURE — 2580000003 HC RX 258: Performed by: STUDENT IN AN ORGANIZED HEALTH CARE EDUCATION/TRAINING PROGRAM

## 2024-04-08 PROCEDURE — 83036 HEMOGLOBIN GLYCOSYLATED A1C: CPT

## 2024-04-08 PROCEDURE — 85018 HEMOGLOBIN: CPT

## 2024-04-08 PROCEDURE — 80048 BASIC METABOLIC PNL TOTAL CA: CPT

## 2024-04-08 PROCEDURE — 85014 HEMATOCRIT: CPT

## 2024-04-08 PROCEDURE — 73552 X-RAY EXAM OF FEMUR 2/>: CPT

## 2024-04-08 PROCEDURE — 7100000000 HC PACU RECOVERY - FIRST 15 MIN: Performed by: ORTHOPAEDIC SURGERY

## 2024-04-08 PROCEDURE — 6360000002 HC RX W HCPCS: Performed by: ANESTHESIOLOGY

## 2024-04-08 PROCEDURE — 3600000014 HC SURGERY LEVEL 4 ADDTL 15MIN: Performed by: ORTHOPAEDIC SURGERY

## 2024-04-08 PROCEDURE — 82306 VITAMIN D 25 HYDROXY: CPT

## 2024-04-08 PROCEDURE — 36415 COLL VENOUS BLD VENIPUNCTURE: CPT

## 2024-04-08 PROCEDURE — 6360000002 HC RX W HCPCS

## 2024-04-08 PROCEDURE — 76942 ECHO GUIDE FOR BIOPSY: CPT | Performed by: ANESTHESIOLOGY

## 2024-04-08 PROCEDURE — 51798 US URINE CAPACITY MEASURE: CPT

## 2024-04-08 PROCEDURE — 3700000000 HC ANESTHESIA ATTENDED CARE: Performed by: ORTHOPAEDIC SURGERY

## 2024-04-08 PROCEDURE — 6360000002 HC RX W HCPCS: Performed by: STUDENT IN AN ORGANIZED HEALTH CARE EDUCATION/TRAINING PROGRAM

## 2024-04-08 PROCEDURE — 3700000001 HC ADD 15 MINUTES (ANESTHESIA): Performed by: ORTHOPAEDIC SURGERY

## 2024-04-08 PROCEDURE — 84443 ASSAY THYROID STIM HORMONE: CPT

## 2024-04-08 PROCEDURE — 1200000000 HC SEMI PRIVATE

## 2024-04-08 PROCEDURE — 6360000002 HC RX W HCPCS: Performed by: NURSE ANESTHETIST, CERTIFIED REGISTERED

## 2024-04-08 PROCEDURE — C1713 ANCHOR/SCREW BN/BN,TIS/BN: HCPCS | Performed by: ORTHOPAEDIC SURGERY

## 2024-04-08 PROCEDURE — 3600000004 HC SURGERY LEVEL 4 BASE: Performed by: ORTHOPAEDIC SURGERY

## 2024-04-08 PROCEDURE — 82040 ASSAY OF SERUM ALBUMIN: CPT

## 2024-04-08 PROCEDURE — 84439 ASSAY OF FREE THYROXINE: CPT

## 2024-04-08 PROCEDURE — 2500000003 HC RX 250 WO HCPCS

## 2024-04-08 PROCEDURE — 0QH736Z INSERTION OF INTRAMEDULLARY INTERNAL FIXATION DEVICE INTO LEFT UPPER FEMUR, PERCUTANEOUS APPROACH: ICD-10-PCS | Performed by: ORTHOPAEDIC SURGERY

## 2024-04-08 PROCEDURE — 2720000010 HC SURG SUPPLY STERILE: Performed by: ORTHOPAEDIC SURGERY

## 2024-04-08 PROCEDURE — 99231 SBSQ HOSP IP/OBS SF/LOW 25: CPT | Performed by: NURSE PRACTITIONER

## 2024-04-08 PROCEDURE — 6370000000 HC RX 637 (ALT 250 FOR IP): Performed by: NURSE PRACTITIONER

## 2024-04-08 PROCEDURE — 2709999900 HC NON-CHARGEABLE SUPPLY: Performed by: ORTHOPAEDIC SURGERY

## 2024-04-08 PROCEDURE — 2580000003 HC RX 258: Performed by: ORTHOPAEDIC SURGERY

## 2024-04-08 PROCEDURE — C1769 GUIDE WIRE: HCPCS | Performed by: ORTHOPAEDIC SURGERY

## 2024-04-08 PROCEDURE — 82607 VITAMIN B-12: CPT

## 2024-04-08 DEVICE — SCREW LCK F/IM NAIL 5X48MM XL25 STR: Type: IMPLANTABLE DEVICE | Site: FEMUR | Status: FUNCTIONAL

## 2024-04-08 DEVICE — IMPLANTABLE DEVICE: Type: IMPLANTABLE DEVICE | Site: FEMUR | Status: FUNCTIONAL

## 2024-04-08 DEVICE — RECON SCREW FOR IM NAIL Ø 6.5MM/ 100MM/ XL40/ STERILE: Type: IMPLANTABLE DEVICE | Site: FEMUR | Status: FUNCTIONAL

## 2024-04-08 DEVICE — RECON SCREW FOR IM NAIL Ø 6.5MM/ 95MM/ XL40/ STERILE: Type: IMPLANTABLE DEVICE | Site: FEMUR | Status: FUNCTIONAL

## 2024-04-08 DEVICE — LOCKING SCREW FOR IM NAIL Ø 5MM/ 52MM/ XL25/ STERILE: Type: IMPLANTABLE DEVICE | Site: FEMUR | Status: FUNCTIONAL

## 2024-04-08 DEVICE — SCREW LOCKING FOR IM NAIL 5X66MM XL25 SILX: Type: IMPLANTABLE DEVICE | Site: FEMUR | Status: FUNCTIONAL

## 2024-04-08 RX ORDER — FENTANYL CITRATE 50 UG/ML
INJECTION, SOLUTION INTRAMUSCULAR; INTRAVENOUS PRN
Status: DISCONTINUED | OUTPATIENT
Start: 2024-04-08 | End: 2024-04-08 | Stop reason: SDUPTHER

## 2024-04-08 RX ORDER — ACETAMINOPHEN 500 MG
1000 TABLET ORAL EVERY 8 HOURS SCHEDULED
Status: DISCONTINUED | OUTPATIENT
Start: 2024-04-08 | End: 2024-04-13 | Stop reason: HOSPADM

## 2024-04-08 RX ORDER — PHENYLEPHRINE HCL IN 0.9% NACL 1 MG/10 ML
SYRINGE (ML) INTRAVENOUS PRN
Status: DISCONTINUED | OUTPATIENT
Start: 2024-04-08 | End: 2024-04-08 | Stop reason: SDUPTHER

## 2024-04-08 RX ORDER — SODIUM CHLORIDE, SODIUM LACTATE, POTASSIUM CHLORIDE, CALCIUM CHLORIDE 600; 310; 30; 20 MG/100ML; MG/100ML; MG/100ML; MG/100ML
INJECTION, SOLUTION INTRAVENOUS CONTINUOUS PRN
Status: DISCONTINUED | OUTPATIENT
Start: 2024-04-08 | End: 2024-04-08 | Stop reason: SDUPTHER

## 2024-04-08 RX ORDER — HYDRALAZINE HYDROCHLORIDE 20 MG/ML
10 INJECTION INTRAMUSCULAR; INTRAVENOUS
Status: DISCONTINUED | OUTPATIENT
Start: 2024-04-08 | End: 2024-04-09

## 2024-04-08 RX ORDER — DIPHENHYDRAMINE HYDROCHLORIDE 50 MG/ML
12.5 INJECTION INTRAMUSCULAR; INTRAVENOUS
Status: DISCONTINUED | OUTPATIENT
Start: 2024-04-08 | End: 2024-04-08

## 2024-04-08 RX ORDER — ROCURONIUM BROMIDE 10 MG/ML
INJECTION, SOLUTION INTRAVENOUS PRN
Status: DISCONTINUED | OUTPATIENT
Start: 2024-04-08 | End: 2024-04-08 | Stop reason: SDUPTHER

## 2024-04-08 RX ORDER — NALOXONE HYDROCHLORIDE 0.4 MG/ML
0.2 INJECTION, SOLUTION INTRAMUSCULAR; INTRAVENOUS; SUBCUTANEOUS PRN
Status: DISCONTINUED | OUTPATIENT
Start: 2024-04-08 | End: 2024-04-08

## 2024-04-08 RX ORDER — DROPERIDOL 2.5 MG/ML
0.62 INJECTION, SOLUTION INTRAMUSCULAR; INTRAVENOUS
Status: DISCONTINUED | OUTPATIENT
Start: 2024-04-08 | End: 2024-04-08

## 2024-04-08 RX ORDER — METOCLOPRAMIDE HYDROCHLORIDE 5 MG/ML
10 INJECTION INTRAMUSCULAR; INTRAVENOUS
Status: DISCONTINUED | OUTPATIENT
Start: 2024-04-08 | End: 2024-04-08

## 2024-04-08 RX ORDER — MAGNESIUM HYDROXIDE 1200 MG/15ML
LIQUID ORAL PRN
Status: DISCONTINUED | OUTPATIENT
Start: 2024-04-08 | End: 2024-04-08 | Stop reason: HOSPADM

## 2024-04-08 RX ORDER — SODIUM CHLORIDE 9 MG/ML
INJECTION, SOLUTION INTRAVENOUS PRN
Status: DISCONTINUED | OUTPATIENT
Start: 2024-04-08 | End: 2024-04-10

## 2024-04-08 RX ORDER — BUPIVACAINE HYDROCHLORIDE 2.5 MG/ML
INJECTION, SOLUTION EPIDURAL; INFILTRATION; INTRACAUDAL
Status: COMPLETED | OUTPATIENT
Start: 2024-04-08 | End: 2024-04-08

## 2024-04-08 RX ORDER — NALOXONE HYDROCHLORIDE 0.4 MG/ML
INJECTION, SOLUTION INTRAMUSCULAR; INTRAVENOUS; SUBCUTANEOUS PRN
Status: DISCONTINUED | OUTPATIENT
Start: 2024-04-08 | End: 2024-04-08

## 2024-04-08 RX ORDER — ONDANSETRON 2 MG/ML
INJECTION INTRAMUSCULAR; INTRAVENOUS PRN
Status: DISCONTINUED | OUTPATIENT
Start: 2024-04-08 | End: 2024-04-08 | Stop reason: SDUPTHER

## 2024-04-08 RX ORDER — SODIUM CHLORIDE 0.9 % (FLUSH) 0.9 %
5-40 SYRINGE (ML) INJECTION EVERY 12 HOURS SCHEDULED
Status: DISCONTINUED | OUTPATIENT
Start: 2024-04-08 | End: 2024-04-10

## 2024-04-08 RX ORDER — MIDAZOLAM HYDROCHLORIDE 2 MG/2ML
2 INJECTION, SOLUTION INTRAMUSCULAR; INTRAVENOUS ONCE
Status: COMPLETED | OUTPATIENT
Start: 2024-04-08 | End: 2024-04-08

## 2024-04-08 RX ORDER — PROPOFOL 10 MG/ML
INJECTION, EMULSION INTRAVENOUS PRN
Status: DISCONTINUED | OUTPATIENT
Start: 2024-04-08 | End: 2024-04-08 | Stop reason: SDUPTHER

## 2024-04-08 RX ORDER — LIDOCAINE HYDROCHLORIDE 10 MG/ML
INJECTION, SOLUTION EPIDURAL; INFILTRATION; INTRACAUDAL; PERINEURAL PRN
Status: DISCONTINUED | OUTPATIENT
Start: 2024-04-08 | End: 2024-04-08 | Stop reason: SDUPTHER

## 2024-04-08 RX ORDER — FENTANYL CITRATE 50 UG/ML
100 INJECTION, SOLUTION INTRAMUSCULAR; INTRAVENOUS ONCE
Status: COMPLETED | OUTPATIENT
Start: 2024-04-08 | End: 2024-04-08

## 2024-04-08 RX ORDER — MAGNESIUM HYDROXIDE 1200 MG/15ML
LIQUID ORAL CONTINUOUS PRN
Status: DISCONTINUED | OUTPATIENT
Start: 2024-04-08 | End: 2024-04-08 | Stop reason: HOSPADM

## 2024-04-08 RX ORDER — SODIUM CHLORIDE 0.9 % (FLUSH) 0.9 %
5-40 SYRINGE (ML) INJECTION PRN
Status: DISCONTINUED | OUTPATIENT
Start: 2024-04-08 | End: 2024-04-13 | Stop reason: HOSPADM

## 2024-04-08 RX ORDER — DEXAMETHASONE SODIUM PHOSPHATE 10 MG/ML
INJECTION INTRAMUSCULAR; INTRAVENOUS PRN
Status: DISCONTINUED | OUTPATIENT
Start: 2024-04-08 | End: 2024-04-08 | Stop reason: SDUPTHER

## 2024-04-08 RX ADMIN — GABAPENTIN 300 MG: 300 CAPSULE ORAL at 21:52

## 2024-04-08 RX ADMIN — PROPOFOL 110 MG: 10 INJECTION, EMULSION INTRAVENOUS at 08:10

## 2024-04-08 RX ADMIN — DEXAMETHASONE SODIUM PHOSPHATE 10 MG: 10 INJECTION INTRAMUSCULAR; INTRAVENOUS at 08:42

## 2024-04-08 RX ADMIN — ENOXAPARIN SODIUM 40 MG: 100 INJECTION SUBCUTANEOUS at 21:06

## 2024-04-08 RX ADMIN — GABAPENTIN 300 MG: 300 CAPSULE ORAL at 13:48

## 2024-04-08 RX ADMIN — GABAPENTIN 300 MG: 300 CAPSULE ORAL at 05:30

## 2024-04-08 RX ADMIN — SODIUM CHLORIDE, PRESERVATIVE FREE 10 ML: 5 INJECTION INTRAVENOUS at 21:07

## 2024-04-08 RX ADMIN — Medication 2000 MG: at 17:20

## 2024-04-08 RX ADMIN — OXYCODONE HYDROCHLORIDE 5 MG: 5 SOLUTION ORAL at 00:00

## 2024-04-08 RX ADMIN — Medication 2000 MG: at 08:45

## 2024-04-08 RX ADMIN — ATORVASTATIN CALCIUM 10 MG: 20 TABLET, FILM COATED ORAL at 21:06

## 2024-04-08 RX ADMIN — SODIUM CHLORIDE, PRESERVATIVE FREE 10 ML: 5 INJECTION INTRAVENOUS at 21:06

## 2024-04-08 RX ADMIN — MIDAZOLAM HYDROCHLORIDE 2 MG: 1 INJECTION, SOLUTION INTRAMUSCULAR; INTRAVENOUS at 07:20

## 2024-04-08 RX ADMIN — HYDROMORPHONE HYDROCHLORIDE 0.5 MG: 1 INJECTION, SOLUTION INTRAMUSCULAR; INTRAVENOUS; SUBCUTANEOUS at 03:04

## 2024-04-08 RX ADMIN — LEVOTHYROXINE SODIUM 100 MCG: 100 TABLET ORAL at 05:30

## 2024-04-08 RX ADMIN — Medication 100 MCG: at 08:36

## 2024-04-08 RX ADMIN — ROCURONIUM BROMIDE 50 MG: 10 SOLUTION INTRAVENOUS at 08:10

## 2024-04-08 RX ADMIN — BUPIVACAINE HYDROCHLORIDE 40 ML: 2.5 INJECTION, SOLUTION EPIDURAL; INFILTRATION; INTRACAUDAL; PERINEURAL at 07:20

## 2024-04-08 RX ADMIN — SODIUM CHLORIDE, POTASSIUM CHLORIDE, SODIUM LACTATE AND CALCIUM CHLORIDE: 600; 310; 30; 20 INJECTION, SOLUTION INTRAVENOUS at 00:02

## 2024-04-08 RX ADMIN — SUGAMMADEX 200 MG: 100 INJECTION, SOLUTION INTRAVENOUS at 11:17

## 2024-04-08 RX ADMIN — ACETAMINOPHEN 1000 MG: 650 SOLUTION ORAL at 03:05

## 2024-04-08 RX ADMIN — SODIUM CHLORIDE, POTASSIUM CHLORIDE, SODIUM LACTATE AND CALCIUM CHLORIDE: 600; 310; 30; 20 INJECTION, SOLUTION INTRAVENOUS at 08:00

## 2024-04-08 RX ADMIN — LIDOCAINE HYDROCHLORIDE 50 MG: 10 INJECTION, SOLUTION EPIDURAL; INFILTRATION; INTRACAUDAL; PERINEURAL at 08:09

## 2024-04-08 RX ADMIN — SODIUM CHLORIDE, SODIUM LACTATE, POTASSIUM CHLORIDE, CALCIUM CHLORIDE: 600; 310; 30; 20 INJECTION, SOLUTION INTRAVENOUS at 08:15

## 2024-04-08 RX ADMIN — Medication 100 MCG: at 09:09

## 2024-04-08 RX ADMIN — OXYCODONE HYDROCHLORIDE 5 MG: 5 SOLUTION ORAL at 05:30

## 2024-04-08 RX ADMIN — ACETAMINOPHEN 1000 MG: 650 SOLUTION ORAL at 13:48

## 2024-04-08 RX ADMIN — ONDANSETRON 4 MG: 2 INJECTION INTRAMUSCULAR; INTRAVENOUS at 11:29

## 2024-04-08 RX ADMIN — ROCURONIUM BROMIDE 10 MG: 10 SOLUTION INTRAVENOUS at 09:57

## 2024-04-08 RX ADMIN — ACETAMINOPHEN 1000 MG: 500 TABLET ORAL at 21:52

## 2024-04-08 RX ADMIN — Medication 100 MCG: at 10:18

## 2024-04-08 RX ADMIN — FENTANYL CITRATE 50 MCG: 0.05 INJECTION, SOLUTION INTRAMUSCULAR; INTRAVENOUS at 09:57

## 2024-04-08 RX ADMIN — ROCURONIUM BROMIDE 20 MG: 10 SOLUTION INTRAVENOUS at 08:46

## 2024-04-08 RX ADMIN — NALOXONE HYDROCHLORIDE 0.2 MG: 0.4 INJECTION, SOLUTION INTRAMUSCULAR; INTRAVENOUS; SUBCUTANEOUS at 07:34

## 2024-04-08 RX ADMIN — Medication 100 MCG: at 09:20

## 2024-04-08 RX ADMIN — FENTANYL CITRATE 50 MCG: 50 INJECTION INTRAMUSCULAR; INTRAVENOUS at 07:20

## 2024-04-08 RX ADMIN — HYDROMORPHONE HYDROCHLORIDE 0.5 MG: 1 INJECTION, SOLUTION INTRAMUSCULAR; INTRAVENOUS; SUBCUTANEOUS at 06:46

## 2024-04-08 ASSESSMENT — PAIN DESCRIPTION - DESCRIPTORS
DESCRIPTORS: DISCOMFORT
DESCRIPTORS: DISCOMFORT

## 2024-04-08 ASSESSMENT — PAIN DESCRIPTION - ORIENTATION: ORIENTATION: LEFT

## 2024-04-08 ASSESSMENT — PAIN - FUNCTIONAL ASSESSMENT: PAIN_FUNCTIONAL_ASSESSMENT: 0-10

## 2024-04-08 ASSESSMENT — PAIN SCALES - GENERAL
PAINLEVEL_OUTOF10: 8
PAINLEVEL_OUTOF10: 4
PAINLEVEL_OUTOF10: 2
PAINLEVEL_OUTOF10: 7
PAINLEVEL_OUTOF10: 0
PAINLEVEL_OUTOF10: 5
PAINLEVEL_OUTOF10: 4
PAINLEVEL_OUTOF10: 8

## 2024-04-08 ASSESSMENT — PAIN DESCRIPTION - LOCATION: LOCATION: LEG

## 2024-04-08 NOTE — PROGRESS NOTES
Dariana Juan was evaluated today and a DME order was entered for a wheeled walker because she requires this to successfully complete daily living tasks of ambulating.  A wheeled walker is necessary due to the patient's unsteady gait, upper body weakness, and inability to  an ambulation device; and she can ambulate only by pushing a walker instead of a lesser assistive device such as a cane, crutch, or standard walker.  The need for this equipment was discussed with the patient and she understands and is in agreement.

## 2024-04-08 NOTE — CARE COORDINATION
Case Management Assessment  Initial Evaluation    Date/Time of Evaluation: 4/8/2024 3:33 PM  Assessment Completed by: LIAM PARKS RN    If patient is discharged prior to next notation, then this note serves as note for discharge by case management.    Patient Name: Dariana Juan                   YOB: 1955  Diagnosis: Left displaced femoral neck fracture (HCC) [S72.002A]  Closed fracture of left femur, unspecified fracture morphology, unspecified portion of femur, initial encounter ([facility]) [S72.92XA]                   Date / Time: 4/7/2024  2:37 PM    Patient Admission Status: Inpatient   Readmission Risk (Low < 19, Mod (19-27), High > 27): Readmission Risk Score: 15    Current PCP: Kathryn Ortiz APRN - CNP  PCP verified by CM? (P) Yes    Chart Reviewed: Yes      History Provided by: (P) Patient  Patient Orientation: (P) Alert and Oriented, Person, Place, Situation, Self    Patient Cognition: (P) Alert    Hospitalization in the last 30 days (Readmission):  No    If yes, Readmission Assessment in  Navigator will be completed.    Advance Directives:      Code Status: Full Code   Patient's Primary Decision Maker is: (P) Legal Next of Kin      Discharge Planning:    Patient lives with: (P) Spouse/Significant Other Type of Home: (P) House  Primary Care Giver: (P) Self  Patient Support Systems include: (P) Children   Current Financial resources:    Current community resources:    Current services prior to admission: (P) None            Current DME:              Type of Home Care services:  (P) None    ADLS  Prior functional level: (P) Independent in ADLs/IADLs  Current functional level: (P) Independent in ADLs/IADLs    PT AM-PAC:   /24  OT AM-PAC:   /24    Family can provide assistance at DC: (P) Yes  Would you like Case Management to discuss the discharge plan with any other family members/significant others, and if so, who? (P) Yes  Plans to Return to Present Housing: (P) Yes  Other  Identified Issues/Barriers to RETURNING to current housing: No  Potential Assistance needed at discharge: (P) Home Care            Potential DME:    Patient expects to discharge to: House  Plan for transportation at discharge: (P) Family    Financial    Payor: MEDICARE / Plan: MEDICARE PART A AND B / Product Type: *No Product type* /     Does insurance require precert for SNF: Yes    Potential assistance Purchasing Medications: (P) No  Meds-to-Beds request: Yes      [id number] [place], [place] - [address] - P [phone] - F [phone]  [address]  [place] [address]  Phone: [phone] Fax: [phone]      Notes:    Factors facilitating achievement of predicted outcomes: Motivated, Cooperative, and Pleasant    Barriers to discharge: Medical complications    Additional Case Management Notes: Transition plan was discussed with the patient and her daughter. States she plans to return home with her  and home health services. Patient choice list provided.    The Plan for Transition of Care is related to the following treatment goals of Left displaced femoral neck fracture (HCC) [S72.002A]  Closed fracture of left femur, unspecified fracture morphology, unspecified portion of femur, initial encounter ([facility]) [S72.92XA]    IF APPLICABLE: The Patient and/or patient representative Dariana and her family were provided with a choice of provider and agrees with the discharge plan. Freedom of choice list with basic dialogue that supports the patient's individualized plan of care/goals and shares the quality data associated with the providers was provided to: (P) Patient   Patient Representative Name:       The Patient and/or Patient Representative Agree with the Discharge Plan? (P) Yes    LIAM PARKS RN  Case Management Department  Ph:  Fax:

## 2024-04-08 NOTE — ANESTHESIA PROCEDURE NOTES
Peripheral Block    Patient location during procedure: pre-op  Reason for block: post-op pain management and at surgeon's request  Start time: 4/8/2024 7:20 AM  End time: 4/8/2024 7:30 AM  Staffing  Performed: anesthesiologist   Anesthesiologist: Aaron Issa MD  Performed by: Aaron Issa MD  Authorized by: Aaron Issa MD    Preanesthetic Checklist  Completed: patient identified, IV checked, site marked, risks and benefits discussed, surgical/procedural consents, equipment checked, pre-op evaluation, timeout performed, anesthesia consent given, oxygen available, monitors applied/VS acknowledged, fire risk safety assessment completed and verbalized and blood product R/B/A discussed and consented  Peripheral Block   Patient position: supine  Prep: ChloraPrep  Provider prep: mask and sterile gloves  Patient monitoring: continuous pulse ox, frequent blood pressure checks and IV access  Block type: Fascia iliaca  Laterality: left  Injection technique: single-shot  Guidance: ultrasound guided    Needle   Needle type: short-bevel   Needle gauge: 20 G  Needle localization: ultrasound guidance  Needle length: 10 cm  Assessment   Injection assessment: negative aspiration for heme, no paresthesia on injection, local visualized surrounding nerve on ultrasound and no intravascular symptoms  Paresthesia pain: none  Slow fractionated injection: yes  Hemodynamics: stable  Outcomes: uncomplicated and patient tolerated procedure well    Medications Administered  BUPivacaine (MARCAINE) PF injection 0.25% - Perineural   40 mL - 4/8/2024 7:20:00 AM

## 2024-04-08 NOTE — DISCHARGE INSTRUCTIONS
Orthopedic Instructions:  -Weight bearing status: activity as tolerated. Weight bear as tolerated to both legs.  -Keep dressing clean and dry.  -Dress with plastic bag and rubber band to seal and repeat with second bag and rubber band when showering.  \"Press & Seal\" wrap also works for sealing the rubber bag when showering.  -Starting 3 days after surgery, Ok for daily dressing changes until wound is dry. Then leave open to air.  if wound is no longer leaking, Ok to shower but no soaks or baths.  -Ice (20 minutes on and off 1 hour) and elevate (above heart) as needed for swelling/pain  -Drink plenty of fluids.  -Call the office or come to Emergency Room if signs of infection appear (hot, swollen, red, draining pus, fever)  -Take medications as prescribed.  -Wean off narcotics (percocet/norco) as soon as possible. Do not take tylenol if still taking narcotics.  -No alcoholic beverages or driving/operating machinery while on narcotics  -Follow up with Dr. Caruso in his office on 4/25/2024 at 8:45 AM. Call [phone] for any questions/concerns.

## 2024-04-08 NOTE — CARE COORDINATION
Met with pt to complete an SBIRT.  Pt states that she was playing hockey with her grandson and fell.    She denies alcohol and drug use and denies depression.  SBIRT is negative.          Alcohol Screening and Brief Intervention        No results for input(s): \"ALC\" in the last 72 hours.    Alcohol Pre-screening     (WOMEN ONLY) How many times in the past year have you had 4 or more drinks in a day?: None       Drug Pre-Screening NO       Drug Screening DAST       Mood Pre-Screening (PHQ-2)  During the past 2 weeks, have you been bothered by, feeling down, depressed or hopeless?  No        I have interviewed Dariana Juan, [id number] regarding  Her alcohol consumption/drug use and risk for excessive use. Screenings were negative.  Patient  N/A intervention at this time.   Deferred []    Completed on: 4/8/2024   CHARISSE SALAZAR

## 2024-04-08 NOTE — BRIEF OP NOTE
Brief Postoperative Note      Patient: Dariana Juan  YOB: 1955  MRN: [id number]    Date of Procedure: 4/8/2024    Pre-Op Diagnosis:  Left pathologic subtrochanteric femur fracture     Post-Op Diagnosis:   Left pathologic subtrochanteric femur fracture        Procedure(s):   Left subtrochanteric femur fracture intramedullary nail fixation     Surgeon(s):  Patricio Caruso DO    Assistant:  Resident: Justino Brown DO    Anesthesia: General    Estimated Blood Loss (mL): 50    IVF (mL): 1700    Complications: None    Specimens:   * No specimens in log *    Implants:  Synthes 10 x 420 mm piriformis FRN  Implant Name Type Inv. Item Serial No.  Lot No. LRB No. Used Action   NAIL FEM RECON PERIFORMIS ST RT [id number] - [id number] Screw/Plate/Nail/Titi NAIL FEM RECON PERIFORMIS ST RT [id number]  SYNTHES-PMM [id number] Left 1 Implanted   SCREW BNE HIP 6.5X95 MM RECON THRD XL40 STRL TUBE IM NAIL - [id number]  SCREW BNE HIP 6.5X95 MM RECON THRD XL40 STRL TUBE IM NAIL  DEPUY [employer] [place] [id number] Left 1 Implanted   SCREW BNE HIP 6.5X100 MM RECON XL40 RECESS IM NAIL TI STRL - [id number]  SCREW BNE HIP 6.5X100 MM RECON XL40 RECESS IM NAIL TI STRL  DEPUY [employer] [place]WD [id number] Left 1 Implanted   SCREW LK F/IM NAIL 5X52MM XL25 STERILE - [id number]  SCREW LK F/IM NAIL 5X52MM XL25 STERILE  TameUY [employer] [place]-WD [id number] Left 1 Implanted   SCREW LOCKING FOR IM NAIL 5X66MM XL25 SILX - [id number]  SCREW LOCKING FOR IM NAIL 5X66MM XL25 SILX  [employer] [place]-WD [id number] Left 1 Implanted   SCREW LCK F/IM NAIL 5X48MM XL25 STR - [id number]  SCREW LCK F/IM NAIL 5X48MM XL25 STR  [employer] [place]-WD [id number] Left 1 Implanted         Drains:   Urinary Catheter 04/07/24 Quintero (Active)   Catheter Indications Perioperative use for selected surgical procedures 04/08/24 0711   Site Assessment No urethral drainage 04/08/24 0711   Urine Color Yellow 04/08/24 0711   Urine Appearance Clear 04/08/24 0711   Collection Container  Standard 04/08/24 0711   Securement Method Securing device (Describe) 04/07/24 2000   Catheter Care  Soap and water 04/08/24 0531   Catheter Best Practices  Drainage tube clipped to bed;Catheter secured to thigh;Tamper seal intact;Bag below bladder;Bag not on floor;Lack of dependent loop in tubing;Drainage bag less than half full 04/07/24 2000   Status Draining;Patent 04/07/24 2000   Output (mL) 600 mL 04/08/24 0531       Findings:  Infection Present At Time Of Surgery (PATOS) (choose all levels that have infection present):  No infection present  Other Findings: see op note    Electronically signed by Patricio Caruso DO on 4/8/2024 at 11:19 AM

## 2024-04-08 NOTE — ANESTHESIA POSTPROCEDURE EVALUATION
POST- ANESTHESIA EVALUATION       Pt Name: Dariana Juan  MRN: [id number]  YOB: 1955  Date of evaluation: 4/8/2024  Time:  1:11 PM      BP (!) 109/54   Pulse 85   Temp 97.7 °F (36.5 °C) (Oral)   Resp 13   Ht 1.727 m (5' 8\")   Wt 61.2 kg (135 lb)   SpO2 97%   BMI 20.53 kg/m²      Consciousness Level  Awake  Cardiopulmonary Status  Stable  Pain Adequately Treated YES  Nausea / Vomiting  NO  Adequate Hydration  YES  Anesthesia Related Complications NONE      Electronically signed by Aaron Issa MD on 4/8/2024 at 1:11 PM     POST- ANESTHESIA EVALUATION       Pt Name: Dariana Juan  MRN: [id number]  YOB: 1955  Date of evaluation: 4/8/2024  Time:  1:11 PM      BP (!) 109/54   Pulse 85   Temp 97.7 °F (36.5 °C) (Oral)   Resp 13   Ht 1.727 m (5' 8\")   Wt 61.2 kg (135 lb)   SpO2 97%   BMI 20.53 kg/m²      Consciousness Level  Awake  Cardiopulmonary Status  Stable  Pain Adequately Treated YES  Nausea / Vomiting  NO  Adequate Hydration  YES  Anesthesia Related Complications NONE      Electronically signed by Aaron Issa MD on 4/8/2024 at 1:11 PM     Department of Anesthesiology  Postprocedure Note    Patient: Dariana Juan  MRN: [id number]  YOB: 1955  Date of evaluation: 4/8/2024    Procedure Summary       Date: 04/08/24 Room / Location: [address]    Anesthesia Start: 0800 Anesthesia Stop: 1131    Procedure: FEMORAL INTRAMEDULLARY NAIL INSERTION (Left: Leg Upper) Diagnosis:       Closed displaced subtrochanteric fracture of left femur, initial encounter ([facility])      (Closed displaced subtrochanteric fracture of left femur, initial encounter ([facility]) [S72.22XA])    Surgeons: Patricio Caruso DO Responsible Provider: Aaron Issa MD    Anesthesia Type: general ASA Status: 2            Anesthesia Type: No value filed.    Tk Phase I: Tk Score: 8    Tk Phase II:      Anesthesia Post Evaluation    No notable events documented.

## 2024-04-08 NOTE — PROGRESS NOTES
PROGRESS NOTE          PATIENT NAME: Dariana Juan  MEDICAL RECORD NO. [id number]  DATE: 4/8/2024    HD: # 1      Patient Active Problem List   Diagnosis    Left displaced femoral neck fracture (HCC)       DIAGNOSIS AND PLAN    Left femur fracture s/p l IMN 4/8/2024  Pain control  Up with ortho restrictions    Hx hld, hypothyroid,       Chief Complaint: \"my leg kind of hurts\"    SUBJECTIVE    Patient seen and examined pre and post op. Pain controlled. Vitals within normal.     OBJECTIVE  VITALS:   Vitals:    04/08/24 0745   BP: 119/62   Pulse: 81   Resp: 19   Temp:    SpO2: 100%     Physical Exam  Constitutional:       Appearance: She is normal weight.   HENT:      Head: Normocephalic and atraumatic.      Right Ear: External ear normal.      Left Ear: External ear normal.      Nose: Nose normal.      Mouth/Throat:      Mouth: Mucous membranes are moist.   Cardiovascular:      Rate and Rhythm: Normal rate and regular rhythm.      Pulses: Normal pulses.   Pulmonary:      Effort: Pulmonary effort is normal. No respiratory distress.   Abdominal:      General: There is no distension.      Palpations: Abdomen is soft.      Tenderness: There is no abdominal tenderness.   Musculoskeletal:      Cervical back: Normal range of motion and neck supple. No rigidity.      Comments: Left lower extremity in traction, pulses palpable, sensation and motor in tact   Skin:     General: Skin is warm.      Capillary Refill: Capillary refill takes less than 2 seconds.      Coloration: Skin is not jaundiced.      Findings: No bruising.   Neurological:      General: No focal deficit present.      Mental Status: She is alert and oriented to person, place, and time.      Cranial Nerves: No cranial nerve deficit.   Psychiatric:         Mood and Affect: Mood normal.         Behavior: Behavior normal.           LAB:  CBC:   Recent Labs     04/07/24  1444   WBC 7.8   HGB 10.6*   HCT 32.9*   MCV 90.6        BMP:   Recent Labs

## 2024-04-08 NOTE — PROGRESS NOTES
15 Variable Trauma-Specific Frailty Index   Comorbidities   Cancer History Yes (1) No (0)   Coronary Heart Disease MI (1) CABG (0.75) Mild (0.25)  No (0)   Dementia Severe (1) Moderate (0.5) Mild (0.25)  No (0)   Daily Activities   Help With Grooming Yes (1) No (0)   Help with Managing Money Yes (1) No (0)   Help doing Housework Yes (1) No (0)   Help with Toileting Yes (1) No (0)   Help Walking Wheelchair (1) Walker (0.75) Cane (0.5) No (0)   Health Attitude   Feel Less Useful Most Time (1) Sometimes (0.5) Never (0)   Feel Sad Most Time (1) Sometimes (0.5) Never (0)   Feel Effort to Do Everything Most Time (1) Sometimes (0.5) Never (0)   Feels Lonely Most Time (1) Sometimes (0.5) Never (0)   Falls Most Time (1) Sometimes (0.5) Never (0)   Function   Sexually Active Yes (0)  No(1)   Nutrition   Albumin < 3g/dL (1)  > 3g/dL   Scoring   Score   FI (Score/15)   > 0.25 = Frail   *Based on 2-weeks prior to hospital admission   Trauma Specific Fraility Index > or = to 4 (4/15 = 0.26)  Trauma Specific Fraility Index Score:    Not frail

## 2024-04-08 NOTE — PROGRESS NOTES
Occupational Therapy    [facility]  Occupational Therapy Not Seen Note    DATE: 2024    NAME: Dariana Juan  MRN: [id number]   : 1955      Patient not seen this date for Occupational Therapy due to:    Surgery/Procedure: Femoral IMN      Next Scheduled Treatment: Please check back for post-op needs    Electronically signed by TRI Yu  on 2024 at 8:29 AM

## 2024-04-08 NOTE — PROGRESS NOTES
RN received call from [facility] regarding pts right femur XR. IMPRESSION:  Findings concerning for bisphosphonate associated stress fracture along the  lateral aspect of the proximal right femoral diaphysis. RN communicated this with Dr. Bearden via PS. Message was read, no answer received, RN communicated this with trauma team Dr. Champagne, RN told team will review shortly.

## 2024-04-08 NOTE — PROGRESS NOTES
POST OP NOTE    SUBJECTIVE  Pt s/p left femur IMN. .     OBJECTIVE  VITALS:  BP (!) 98/58   Pulse 77   Temp 97.7 °F (36.5 °C) (Oral)   Resp 13   Ht 1.727 m (5' 8\")   Wt 61.2 kg (135 lb)   SpO2 95%   BMI 20.53 kg/m²         GENERAL:  awake and alert.  No acute distress  CARDIOVASCULAR:  regular rate and rhythm   LUNGS:  normal effort  ABDOMEN:   Abdomen soft, non-tender, non-distended  INCISION: Incision clean/dry/intact, TTP LLE appropriate     ASSESSMENT  1. POD# 0 s/p Left femur IMN    PLAN  1. Pain management-MMPT  2. DVT proph-Lovenox   3. Okay for regular diet  4. Remove romero catheter     Марина Wolf MD  PGY-4 General Surgery  6:16 PM 04/08/24

## 2024-04-08 NOTE — DISCHARGE INSTR - COC
Continuity of Care Form    Patient Name: Dariana Juan   :  1955  MRN:  [id number]    Admit date:  2024  Discharge date:  ***    Code Status Order: Full Code   Advance Directives:   Advance Care Flowsheet Documentation       Date/Time Healthcare Directive Type of Healthcare Directive Copy in Chart Healthcare Agent Appointed Healthcare Agent's Name Healthcare Agent's Phone Number    24 0650 No, patient does not have an advance directive for healthcare treatment -- -- -- -- --            Admitting Physician:  Sabas Black DO  PCP: Kathryn Ortiz, APRN - CNP    Discharging Nurse: ***  Discharging Hospital Unit/Room#: 0162/0162-01  Discharging Unit Phone Number: [phone]    Emergency Contact:   Extended Emergency Contact Information  Primary Emergency Contact: DrakeChristiano  Address: [address] [place] [address]  Home Phone: [phone]  Mobile Phone: [phone]  Relation: Spouse  Secondary Emergency Contact: Angelique Medina  Address: .           [address]  Home Phone: [phone]  Relation: Child    Past Surgical History:  Past Surgical History:   Procedure Laterality Date    BLADDER SUSPENSION      prolapse repair    BREAST BIOPSY      three titaanium markers in left breast      SECTION      CHOLECYSTECTOMY      COLONOSCOPY      EMG          FEMUR COMFORT NAIL INSERTION  2024    HYSTERECTOMY (CERVIX STATUS UNKNOWN)      has lone ovary    THYROID SURGERY         Immunization History:   Immunization History   Administered Date(s) Administered    COVID-19, PFIZER PURPLE top, DILUTE for use, (age 12 y+), 30mcg/0.3mL 2021, 2021, 10/29/2021    COVID-19, PFIZER, ( formula), (age 12y+), IM, 30mcg/0.3mL 10/24/2023       Active Problems:  Patient Active Problem List   Diagnosis Code    Left displaced femoral neck fracture (HCC) S72.002A    Closed fracture of left femur (HCC)

## 2024-04-08 NOTE — PROGRESS NOTES
X-ray was called and [facility] stated that they were short staffed and that they would take the patient's x ray in her room.

## 2024-04-08 NOTE — PROGRESS NOTES
Orthopedic Progress Note    Patient:  Dariana Juan  YOB: 1955     68 y.o. female    Subjective:  Patient seen and examined.  Resting comfortably in bed.  No complaints or concerns  No acute issue overnight  Pain controlled and improved overnight  Denies fever, HA, CP, SOB, N/V, dysuria    Vitals reviewed    Objective:   Vitals:    04/08/24 0334   BP:    Pulse:    Resp: 16   Temp:    SpO2:        Gen: NAD, cooperative    Cardiovascular: Regular rate    Respiratory: Chest symmetric, no accessory muscle use, normal respirations, no audible wheezes    MSK: LLE: Skin intact.  No laceration, or abrasion.  Swelling and tenderness about the fracture site, however compartments remains soft and compressible Compartments soft. EHL/FHL/TA/GSC motor intact. Sensation intact to sural/saph/SPN/DPN distribution. DP/PT pulses 2+.  Traction is resting off of the leg and the foot is resting off of the bed.  Weight off the end of the bed is suspended in the air.      Recent Labs     04/07/24  1444   WBC 7.8   HGB 10.6*   HCT 32.9*      INR 1.0      K 4.4   BUN 26*   CREATININE 1.0*   GLUCOSE 176*        See rec for complete list    Impression/plan: 68 y.o. female who FFSH, being seen for:    -Left subtrochanteric femur fracture  -Bisphosphonate related fracture     Plan:     - Plan for OR with Dr. Caruso today, 4/8/2024.  Patient has been risk stratified by primary team and medically optimized.  - Nonweightbearing left lower extremity.  - Distal femur skeletal traction in place.  Please see traction care instructions below  - Diet: Has been NPO since midnight in anticipation for OR   - Abx: Ancef OCTOR  - DVT ppx: Please hold chemical AC in anticipation for surgery  - Consent in chart/surgical site marked  - Pain control and medical management per primary   - Ice extremity for pain and swelling   - Please contact Ortho on call with any questions     Skeletal Traction Care: Always ensure the  rope/tension bow is not resting directly against the patient's skin. Reposition or pad the area with fluffs/abds/etc as needed to prevent skin breakdown.The limb should be directly in line with the pull of the traction. Ok to remove weights temporarily for transfer/repositioning but always reapply. Ensure that weight are not resting on edge of bed or floor. Ortho team will manage pin sites      -------------------------------------  Jac Fields DO  PGY-3, Department of Orthopedic Surgery  [facility], [place], [place]

## 2024-04-08 NOTE — ANESTHESIA PRE PROCEDURE
ceFAZolin (ANCEF) 2000 mg in sterile water 20 mL IV syringe  2,000 mg IntraVENous On Call to OR Jac Fields DO           Allergies:    Allergies   Allergen Reactions   • Iodinated Contrast Media    • Penicillins        Problem List:    Patient Active Problem List   Diagnosis Code   • Left displaced femoral neck fracture (HCC) S72.002A       Past Medical History:        Diagnosis Date   • BRCA1 gene mutation positive    • BRCA2 gene mutation positive    • Cancer (HCC)     melonoma, left arm, melanoma back of neck       Past Surgical History:        Procedure Laterality Date   • BLADDER SUSPENSION      prolapse repair   • BREAST BIOPSY      three titaanium markers in left breast    •  SECTION     • CHOLECYSTECTOMY     • COLONOSCOPY     • EMG         • HYSTERECTOMY (CERVIX STATUS UNKNOWN)      has lone ovary   • THYROID SURGERY         Social History:    Social History     Tobacco Use   • Smoking status: Never   • Smokeless tobacco: Never   Substance Use Topics   • Alcohol use: Not on file                                Counseling given: Not Answered      Vital Signs (Current):   Vitals:    24 2220 24 0030 24 0334 24 0600   BP:       Pulse:       Resp: 16 14 16 16   Temp:       TempSrc:       SpO2:       Weight:       Height:                                                  BP Readings from Last 3 Encounters:   24 107/69       NPO Status:                                                                                 BMI:   Wt Readings from Last 3 Encounters:   24 61.2 kg (135 lb)     Body mass index is 20.53 kg/m².    CBC:   Lab Results   Component Value Date/Time    WBC 7.8 2024 02:44 PM    RBC 3.63 2024 02:44 PM    HGB 10.6 2024 02:44 PM    HCT 32.9 2024 02:44 PM    MCV 90.6 2024 02:44 PM    RDW 12.6 2024 02:44 PM     2024 02:44 PM       CMP:   Lab Results   Component Value Date/Time     2024 02:44 PM

## 2024-04-09 LAB
ANION GAP SERPL CALCULATED.3IONS-SCNC: 10 MMOL/L (ref 9–16)
BASOPHILS # BLD: <0.03 K/UL (ref 0–0.2)
BASOPHILS NFR BLD: 0 % (ref 0–2)
BILIRUB UR QL STRIP: NEGATIVE
BUN SERPL-MCNC: 23 MG/DL (ref 8–23)
CALCIUM SERPL-MCNC: 8 MG/DL (ref 8.6–10.4)
CHLORIDE SERPL-SCNC: 106 MMOL/L (ref 98–107)
CLARITY UR: CLEAR
CO2 SERPL-SCNC: 20 MMOL/L (ref 20–31)
COLOR UR: YELLOW
COMMENT: NORMAL
CREAT SERPL-MCNC: 1.2 MG/DL (ref 0.5–0.9)
EOSINOPHIL # BLD: <0.03 K/UL (ref 0–0.44)
EOSINOPHILS RELATIVE PERCENT: 0 % (ref 1–4)
ERYTHROCYTE [DISTWIDTH] IN BLOOD BY AUTOMATED COUNT: 12.8 % (ref 11.8–14.4)
GFR SERPL CREATININE-BSD FRML MDRD: 50 ML/MIN/1.73M2
GLUCOSE BLD-MCNC: 134 MG/DL (ref 65–105)
GLUCOSE BLD-MCNC: 161 MG/DL (ref 65–105)
GLUCOSE BLD-MCNC: 170 MG/DL (ref 65–105)
GLUCOSE BLD-MCNC: 195 MG/DL (ref 65–105)
GLUCOSE SERPL-MCNC: 204 MG/DL (ref 74–99)
GLUCOSE UR STRIP-MCNC: NEGATIVE MG/DL
HCT VFR BLD AUTO: 27.6 % (ref 36.3–47.1)
HGB BLD-MCNC: 8.4 G/DL (ref 11.9–15.1)
HGB UR QL STRIP.AUTO: NEGATIVE
IMM GRANULOCYTES # BLD AUTO: 0.06 K/UL (ref 0–0.3)
IMM GRANULOCYTES NFR BLD: 1 %
KETONES UR STRIP-MCNC: NEGATIVE MG/DL
LEUKOCYTE ESTERASE UR QL STRIP: NEGATIVE
LYMPHOCYTES NFR BLD: 1.03 K/UL (ref 1.1–3.7)
LYMPHOCYTES RELATIVE PERCENT: 11 % (ref 24–43)
MCH RBC QN AUTO: 29 PG (ref 25.2–33.5)
MCHC RBC AUTO-ENTMCNC: 30.4 G/DL (ref 28.4–34.8)
MCV RBC AUTO: 95.2 FL (ref 82.6–102.9)
MONOCYTES NFR BLD: 0.81 K/UL (ref 0.1–1.2)
MONOCYTES NFR BLD: 8 % (ref 3–12)
NEUTROPHILS NFR BLD: 80 % (ref 36–65)
NEUTS SEG NFR BLD: 7.75 K/UL (ref 1.5–8.1)
NITRITE UR QL STRIP: NEGATIVE
NRBC BLD-RTO: 0 PER 100 WBC
PH UR STRIP: 5.5 [PH] (ref 5–8)
PLATELET # BLD AUTO: 114 K/UL (ref 138–453)
PMV BLD AUTO: 12.3 FL (ref 8.1–13.5)
POTASSIUM SERPL-SCNC: 4.9 MMOL/L (ref 3.7–5.3)
PROT UR STRIP-MCNC: NEGATIVE MG/DL
RBC # BLD AUTO: 2.9 M/UL (ref 3.95–5.11)
SODIUM SERPL-SCNC: 136 MMOL/L (ref 136–145)
SP GR UR STRIP: 1.01 (ref 1–1.03)
UROBILINOGEN UR STRIP-ACNC: NORMAL EU/DL (ref 0–1)
WBC OTHER # BLD: 9.7 K/UL (ref 3.5–11.3)

## 2024-04-09 PROCEDURE — 82947 ASSAY GLUCOSE BLOOD QUANT: CPT

## 2024-04-09 PROCEDURE — 96127 BRIEF EMOTIONAL/BEHAV ASSMT: CPT | Performed by: SOCIAL WORKER

## 2024-04-09 PROCEDURE — 81003 URINALYSIS AUTO W/O SCOPE: CPT

## 2024-04-09 PROCEDURE — 51701 INSERT BLADDER CATHETER: CPT

## 2024-04-09 PROCEDURE — 6360000002 HC RX W HCPCS: Performed by: STUDENT IN AN ORGANIZED HEALTH CARE EDUCATION/TRAINING PROGRAM

## 2024-04-09 PROCEDURE — 36415 COLL VENOUS BLD VENIPUNCTURE: CPT

## 2024-04-09 PROCEDURE — 6370000000 HC RX 637 (ALT 250 FOR IP): Performed by: STUDENT IN AN ORGANIZED HEALTH CARE EDUCATION/TRAINING PROGRAM

## 2024-04-09 PROCEDURE — 6370000000 HC RX 637 (ALT 250 FOR IP)

## 2024-04-09 PROCEDURE — 99221 1ST HOSP IP/OBS SF/LOW 40: CPT | Performed by: PHYSICAL MEDICINE & REHABILITATION

## 2024-04-09 PROCEDURE — 99232 SBSQ HOSP IP/OBS MODERATE 35: CPT | Performed by: SURGERY

## 2024-04-09 PROCEDURE — 80048 BASIC METABOLIC PNL TOTAL CA: CPT

## 2024-04-09 PROCEDURE — 85025 COMPLETE CBC W/AUTO DIFF WBC: CPT

## 2024-04-09 PROCEDURE — 2580000003 HC RX 258: Performed by: STUDENT IN AN ORGANIZED HEALTH CARE EDUCATION/TRAINING PROGRAM

## 2024-04-09 PROCEDURE — 6370000000 HC RX 637 (ALT 250 FOR IP): Performed by: NURSE PRACTITIONER

## 2024-04-09 PROCEDURE — 1200000000 HC SEMI PRIVATE

## 2024-04-09 PROCEDURE — 6360000002 HC RX W HCPCS

## 2024-04-09 PROCEDURE — 2580000003 HC RX 258

## 2024-04-09 RX ORDER — SODIUM CHLORIDE 9 MG/ML
INJECTION, SOLUTION INTRAVENOUS CONTINUOUS
Status: DISCONTINUED | OUTPATIENT
Start: 2024-04-09 | End: 2024-04-10

## 2024-04-09 RX ORDER — GLUCAGON 1 MG/ML
1 KIT INJECTION PRN
Status: DISCONTINUED | OUTPATIENT
Start: 2024-04-09 | End: 2024-04-10

## 2024-04-09 RX ORDER — DEXTROSE MONOHYDRATE 100 MG/ML
INJECTION, SOLUTION INTRAVENOUS CONTINUOUS PRN
Status: DISCONTINUED | OUTPATIENT
Start: 2024-04-09 | End: 2024-04-10

## 2024-04-09 RX ORDER — GABAPENTIN 100 MG/1
100 CAPSULE ORAL EVERY 8 HOURS SCHEDULED
Status: DISCONTINUED | OUTPATIENT
Start: 2024-04-09 | End: 2024-04-13 | Stop reason: HOSPADM

## 2024-04-09 RX ORDER — HEPARIN SODIUM 5000 [USP'U]/ML
5000 INJECTION, SOLUTION INTRAVENOUS; SUBCUTANEOUS EVERY 8 HOURS SCHEDULED
Status: DISCONTINUED | OUTPATIENT
Start: 2024-04-09 | End: 2024-04-10

## 2024-04-09 RX ORDER — INSULIN LISPRO 100 [IU]/ML
0-4 INJECTION, SOLUTION INTRAVENOUS; SUBCUTANEOUS
Status: DISCONTINUED | OUTPATIENT
Start: 2024-04-09 | End: 2024-04-10

## 2024-04-09 RX ORDER — INSULIN LISPRO 100 [IU]/ML
0-16 INJECTION, SOLUTION INTRAVENOUS; SUBCUTANEOUS
Status: DISCONTINUED | OUTPATIENT
Start: 2024-04-09 | End: 2024-04-09

## 2024-04-09 RX ADMIN — SODIUM CHLORIDE, PRESERVATIVE FREE 10 ML: 5 INJECTION INTRAVENOUS at 09:15

## 2024-04-09 RX ADMIN — LEVOTHYROXINE SODIUM 100 MCG: 100 TABLET ORAL at 06:20

## 2024-04-09 RX ADMIN — ACETAMINOPHEN 1000 MG: 500 TABLET ORAL at 06:20

## 2024-04-09 RX ADMIN — SODIUM CHLORIDE, PRESERVATIVE FREE 10 ML: 5 INJECTION INTRAVENOUS at 20:09

## 2024-04-09 RX ADMIN — GABAPENTIN 300 MG: 300 CAPSULE ORAL at 06:20

## 2024-04-09 RX ADMIN — Medication 2000 MG: at 00:18

## 2024-04-09 RX ADMIN — GABAPENTIN 100 MG: 100 CAPSULE ORAL at 21:58

## 2024-04-09 RX ADMIN — ATORVASTATIN CALCIUM 10 MG: 20 TABLET, FILM COATED ORAL at 20:09

## 2024-04-09 RX ADMIN — ACETAMINOPHEN 1000 MG: 500 TABLET ORAL at 14:23

## 2024-04-09 RX ADMIN — GABAPENTIN 100 MG: 100 CAPSULE ORAL at 14:25

## 2024-04-09 RX ADMIN — HEPARIN SODIUM 5000 UNITS: 5000 INJECTION INTRAVENOUS; SUBCUTANEOUS at 22:10

## 2024-04-09 RX ADMIN — ENOXAPARIN SODIUM 40 MG: 100 INJECTION SUBCUTANEOUS at 08:58

## 2024-04-09 RX ADMIN — SODIUM CHLORIDE: 9 INJECTION, SOLUTION INTRAVENOUS at 10:54

## 2024-04-09 RX ADMIN — ACETAMINOPHEN 1000 MG: 500 TABLET ORAL at 20:09

## 2024-04-09 ASSESSMENT — PAIN DESCRIPTION - ORIENTATION
ORIENTATION: RIGHT;LEFT
ORIENTATION: RIGHT;LEFT;POSTERIOR;ANTERIOR
ORIENTATION: LEFT
ORIENTATION: LEFT
ORIENTATION: LEFT;RIGHT

## 2024-04-09 ASSESSMENT — PAIN SCALES - GENERAL
PAINLEVEL_OUTOF10: 1
PAINLEVEL_OUTOF10: 1
PAINLEVEL_OUTOF10: 2
PAINLEVEL_OUTOF10: 2
PAINLEVEL_OUTOF10: 1

## 2024-04-09 ASSESSMENT — PAIN DESCRIPTION - LOCATION
LOCATION: KNEE;LEG
LOCATION: LEG
LOCATION: LEG;BACK
LOCATION: LEG
LOCATION: LEG

## 2024-04-09 ASSESSMENT — PAIN DESCRIPTION - DESCRIPTORS
DESCRIPTORS: SORE;DISCOMFORT
DESCRIPTORS: DISCOMFORT;SORE
DESCRIPTORS: DISCOMFORT;ACHING
DESCRIPTORS: DISCOMFORT

## 2024-04-09 NOTE — CONSULTS
[facility] In[facility] Brief Diagnostic Assessment Note  AMBER Castro   4/9/2024  2:10 PM  Dariana Juan  1955  [id number]      Time spent with Patient: 5 minutes     Pt was provided informed consent for the [facility]. Discussed with patient model of service to include the limits of confidentiality (i.e. abuse reporting, suicide intervention, etc.) and short-term intervention focused approach.  Pt indicated understanding.    This was not a virtual session      Presenting Patient Report:  Patient was screened at the request of the Trauma Team.   Patient presents as a 68 y.o. female subsequent to hospital admission following a fall resulting in injury. Pt's  and son were present with her consent.     Patient was able to complete the following screens: ITSS    Pt denies hx of anxiety or depression.  Pt reports some nervousness but emotions were appropriate to context.  Pt in good spirits.  Therapist offered brief emotional support and trauma psychoeducation.  Pt declined further bedside support, business card given for future needs.      MSE:     Appearance:   Well Groomed, Good Hygiene, and Good Eye Contact  Appetite normal  Sleep disturbance No  Fatigue No  Loss of pleasure No  Impulsive behavior No  Speech    normal rate, normal volume, and well articulated  Mood    Euthymic and Hopefull / Future Oriented  Affect    Appropriate to Context and Normal  Thought Content    intact  Thought Process    linear, goal directed, and coherent  Associations    logical connections  Insight    Good  Judgment    Intact  Orientation    oriented to person, place, time, and general circumstances  Memory    recent and remote memory intact  Attention/Concentration    intact  Morbid ideation No  Suicide Assessment    no suicidal ideation      Assessment:  Pt was fully engaged in bedside visit.  Mood and affect congruent.  No mental health hx, no current depression.  Nervousness is appropriate

## 2024-04-09 NOTE — PLAN OF CARE
Problem: Pain  Goal: Verbalizes/displays adequate comfort level or baseline comfort level  4/9/2024 1806 by Silas Guillaume RN  Outcome: Progressing  4/9/2024 0519 by Angeline Silva RN  Outcome: Progressing     Problem: Skin/Tissue Integrity  Goal: Absence of new skin breakdown  Description: 1.  Monitor for areas of redness and/or skin breakdown  2.  Assess vascular access sites hourly  3.  Every 4-6 hours minimum:  Change oxygen saturation probe site  4.  Every 4-6 hours:  If on nasal continuous positive airway pressure, respiratory therapy assess nares and determine need for appliance change or resting period.  4/9/2024 1806 by Silas Guillaume RN  Outcome: Progressing  4/9/2024 0519 by Angeline Silva RN  Outcome: Progressing     Problem: Safety - Adult  Goal: Free from fall injury  4/9/2024 1806 by Silas Guillaume RN  Outcome: Progressing  4/9/2024 0519 by Angeline Silva RN  Outcome: Progressing     Problem: ABCDS Injury Assessment  Goal: Absence of physical injury  4/9/2024 1806 by Silas Guillaume, RN  Outcome: Progressing  4/9/2024 0519 by Angeline Silva RN  Outcome: Progressing     Problem: Discharge Planning  Goal: Discharge to home or other facility with appropriate resources  4/9/2024 1806 by Silas Guillaume RN  Outcome: Progressing  4/9/2024 0519 by Angeline Silva RN  Outcome: Progressing

## 2024-04-09 NOTE — CARE COORDINATION
Discussed the transition plan. Patient provided choices for home care services: Order of preference - 1 [facility], 2 [facility], 3 [facility]. Patient found to have a fracture in the right hip as well. May need rehab instead of home health services. Patient preference list provided for patient choice. She has selected [facility] as her [facility] choice and [facility] as her second choice.     1250 Referrals sent to : [facility], [facility], [facility], [facility] and [facility].    1434 Received a call from [facility] with [facility], they can accept the patient.    1500 Received a call from Diana with [facility]. They are able to take the patient.

## 2024-04-09 NOTE — PROGRESS NOTES
[facility]  Occupational Therapy Not Seen Note    DATE: 2024    NAME: Dariana Juan  MRN: [id number]   : 1955      Patient not seen this date for Occupational Therapy due to:    Surgery/Procedure: plan for OR tomorrow with ortho, will eval post op    Next Scheduled Treatment: check back 2024    Electronically signed by TRI Reddy on 2024 at 9:48 AM

## 2024-04-09 NOTE — PROGRESS NOTES
PROGRESS NOTE          PATIENT NAME: Dariana Juan  MEDICAL RECORD NO. [id number]  DATE: 4/9/2024    HD: # 2      Patient Active Problem List   Diagnosis    Left displaced femoral neck fracture (HCC)    Closed fracture of left femur (HCC)       DIAGNOSIS AND PLAN    Left femur fracture s/p l IMN 4/8/2024  Right femur stress fracture- plan for OR 4/10  Urinary retention and cr increase   Quintero placed   [facility]   Lovenox switched to hep   Strict record intake output   Hold lisinopril   UA  Hep sq  Low dose sliding scale insulin started- hg A1c 6.5  Carb control diet  MMPT  Pt/ot      Chief Complaint: \"I can't pee\"    SUBJECTIVE    Patient seen and examined. Af, vitals within normal. Pain controlled. Complains of not being able to urinate.    OBJECTIVE  VITALS:   Vitals:    04/09/24 1058   BP: 97/62   Pulse: 87   Resp: 14   Temp: 98.2 °F (36.8 °C)   SpO2: 98%     Physical Exam  Constitutional:       Appearance: She is normal weight.   HENT:      Head: Normocephalic and atraumatic.      Right Ear: External ear normal.      Left Ear: External ear normal.      Nose: Nose normal.      Mouth/Throat:      Mouth: Mucous membranes are moist.   Cardiovascular:      Rate and Rhythm: Normal rate and regular rhythm.      Pulses: Normal pulses.   Pulmonary:      Effort: Pulmonary effort is normal. No respiratory distress.   Abdominal:      General: There is no distension.      Palpations: Abdomen is soft.      Tenderness: There is no abdominal tenderness.   Musculoskeletal:      Cervical back: Normal range of motion and neck supple. No rigidity.      Comments: Left lower extremity splinted, sensation in tact and good cap refill   Skin:     General: Skin is warm.      Capillary Refill: Capillary refill takes less than 2 seconds.      Coloration: Skin is not jaundiced.      Findings: No bruising.   Neurological:      General: No focal deficit present.      Mental Status: She is alert and oriented to person, place, and time.

## 2024-04-09 NOTE — PLAN OF CARE
Orthopedic Brief Plan of Care     Patient:  Dariana Juan  YOB: 1955     68 y.o. female    Impression 68 y.o. female being seen for:    - Left subtrochanteric femur fracture s/p IMN, DOS: 4/8/24  - Right femoral shaft stress fracture    Plan  - To OR with Dr. Caruso 4/10/24 for treatment of right femoral shaft stress fracture  - NPO 4/10/24 @ 0001  - Ancef OCTOR   - DVT ppx: please hold 4/10/24 in anticipation for surgery  - WB status: WBAT LLE, NWB RLE  - Soft dressings to LLE, please maintain and reinforce as needed  - Ice for Pain and Swelling  - Please page the On-Call-Ortho-Resident with any questions.  _________________________________  Carlos Bearden D.O.  Orthopedic Surgery Resident, PGY-2  [place], [place]

## 2024-04-09 NOTE — CONSULTS
mutation positive     Cancer (HCC)     melonoma, left arm, melanoma back of neck       Past Surgical History:        Procedure Laterality Date    BLADDER SUSPENSION      prolapse repair    BREAST BIOPSY      three titaanium markers in left breast      SECTION      CHOLECYSTECTOMY      COLONOSCOPY      EMG          FEMUR COMFORT NAIL INSERTION  2024    HYSTERECTOMY (CERVIX STATUS UNKNOWN)      has lone ovary    THYROID SURGERY         Allergies:    Allergies   Allergen Reactions    Iodinated Contrast Media     Penicillins         Current Medications:   Current Facility-Administered Medications: glucose chewable tablet 16 g, 4 tablet, Oral, PRN  dextrose bolus 10% 125 mL, 125 mL, IntraVENous, PRN **OR** dextrose bolus 10% 250 mL, 250 mL, IntraVENous, PRN  glucagon injection 1 mg, 1 mg, SubCUTAneous, PRN  dextrose 10 % infusion, , IntraVENous, Continuous PRN  insulin lispro (HUMALOG) injection vial 0-4 Units, 0-4 Units, SubCUTAneous, TID WC  heparin (porcine) injection 5,000 Units, 5,000 Units, SubCUTAneous, 3 times per day  0.9 % sodium chloride infusion, , IntraVENous, Continuous  gabapentin (NEURONTIN) capsule 100 mg, 100 mg, Oral, 3 times per day  sodium chloride flush 0.9 % injection 5-40 mL, 5-40 mL, IntraVENous, 2 times per day  sodium chloride flush 0.9 % injection 5-40 mL, 5-40 mL, IntraVENous, PRN  0.9 % sodium chloride infusion, , IntraVENous, PRN  acetaminophen (TYLENOL) tablet 1,000 mg, 1,000 mg, Oral, 3 times per day  [START ON 4/10/2024] ceFAZolin (ANCEF) 2000 mg in sterile water 20 mL IV syringe, 2,000 mg, IntraVENous, On Call to OR  sodium chloride flush 0.9 % injection 5-40 mL, 5-40 mL, IntraVENous, 2 times per day  sodium chloride flush 0.9 % injection 5-40 mL, 5-40 mL, IntraVENous, PRN  0.9 % sodium chloride infusion, , IntraVENous, PRN  ondansetron (ZOFRAN-ODT) disintegrating tablet 4 mg, 4 mg, Oral, Q8H PRN **OR** ondansetron (ZOFRAN) injection 4 mg, 4 mg, IntraVENous, Q6H  fracture-felt to be related biphosphonate  2. Therapy: Pending  3. Medical  Necessity: As above  4. Support: Clarify  5. Rehab recommendation: Disposition recommendations to follow therapy evaluation  6. DVT proph: Subcu heparin    It was my pleasure to evaluate Dariana Juan today.  Please call with questions.    Aaron Aguilera MD          This note is created with the assistance of a speech recognition program.  While intending to generate a document that actually reflects the content of the visit, the document can still have some errors including those of syntax and sound a like substitutions which may escape proof reading.  In such instances, actual meaning can be extrapolated by contextual diversion

## 2024-04-09 NOTE — PLAN OF CARE
Problem: Pain  Goal: Verbalizes/displays adequate comfort level or baseline comfort level  4/9/2024 0519 by Angeline Silva RN  Outcome: Progressing  4/8/2024 1752 by Rina Pabon RN  Outcome: Progressing     Problem: Skin/Tissue Integrity  Goal: Absence of new skin breakdown  Description: 1.  Monitor for areas of redness and/or skin breakdown  2.  Assess vascular access sites hourly  3.  Every 4-6 hours minimum:  Change oxygen saturation probe site  4.  Every 4-6 hours:  If on nasal continuous positive airway pressure, respiratory therapy assess nares and determine need for appliance change or resting period.  4/9/2024 0519 by Angeline Silva RN  Outcome: Progressing  4/8/2024 1752 by Rina Pabon RN  Outcome: Progressing     Problem: Safety - Adult  Goal: Free from fall injury  4/9/2024 0519 by Angeline Silva RN  Outcome: Progressing  4/8/2024 1752 by Rina Pabon RN  Outcome: Progressing     Problem: ABCDS Injury Assessment  Goal: Absence of physical injury  4/9/2024 0519 by Angeline Silva RN  Outcome: Progressing  4/8/2024 1752 by Rina Pabon RN  Outcome: Progressing     Problem: Discharge Planning  Goal: Discharge to home or other facility with appropriate resources  4/9/2024 0519 by Angeline Silva RN  Outcome: Progressing  4/8/2024 1752 by Rina Pabon RN  Outcome: Progressing

## 2024-04-10 ENCOUNTER — ANESTHESIA (OUTPATIENT)
Dept: OPERATING ROOM | Age: 69
End: 2024-04-10
Payer: MEDICARE

## 2024-04-10 ENCOUNTER — APPOINTMENT (OUTPATIENT)
Dept: GENERAL RADIOLOGY | Age: 69
End: 2024-04-10
Payer: MEDICARE

## 2024-04-10 ENCOUNTER — ANESTHESIA EVENT (OUTPATIENT)
Dept: OPERATING ROOM | Age: 69
End: 2024-04-10
Payer: MEDICARE

## 2024-04-10 PROBLEM — M84.351A STRESS FRACTURE OF RIGHT FEMUR: Status: ACTIVE | Noted: 2024-04-10

## 2024-04-10 LAB
ANION GAP SERPL CALCULATED.3IONS-SCNC: 8 MMOL/L (ref 9–16)
BASOPHILS # BLD: <0.03 K/UL (ref 0–0.2)
BASOPHILS NFR BLD: 0 % (ref 0–2)
BUN SERPL-MCNC: 22 MG/DL (ref 8–23)
CALCIUM SERPL-MCNC: 8.2 MG/DL (ref 8.6–10.4)
CHLORIDE SERPL-SCNC: 107 MMOL/L (ref 98–107)
CO2 SERPL-SCNC: 25 MMOL/L (ref 20–31)
CREAT SERPL-MCNC: 0.9 MG/DL (ref 0.5–0.9)
EOSINOPHIL # BLD: 0.1 K/UL (ref 0–0.44)
EOSINOPHILS RELATIVE PERCENT: 1 % (ref 1–4)
ERYTHROCYTE [DISTWIDTH] IN BLOOD BY AUTOMATED COUNT: 13.2 % (ref 11.8–14.4)
GFR SERPL CREATININE-BSD FRML MDRD: 67 ML/MIN/1.73M2
GLUCOSE BLD-MCNC: 169 MG/DL (ref 65–105)
GLUCOSE BLD-MCNC: 361 MG/DL (ref 65–105)
GLUCOSE SERPL-MCNC: 165 MG/DL (ref 74–99)
HCT VFR BLD AUTO: 23.2 % (ref 36.3–47.1)
HCT VFR BLD AUTO: 26.8 % (ref 36.3–47.1)
HGB BLD-MCNC: 7.4 G/DL (ref 11.9–15.1)
HGB BLD-MCNC: 8.3 G/DL (ref 11.9–15.1)
IMM GRANULOCYTES # BLD AUTO: 0.06 K/UL (ref 0–0.3)
IMM GRANULOCYTES NFR BLD: 1 %
LYMPHOCYTES NFR BLD: 1.71 K/UL (ref 1.1–3.7)
LYMPHOCYTES RELATIVE PERCENT: 21 % (ref 24–43)
MCH RBC QN AUTO: 28.7 PG (ref 25.2–33.5)
MCHC RBC AUTO-ENTMCNC: 31 G/DL (ref 28.4–34.8)
MCV RBC AUTO: 92.7 FL (ref 82.6–102.9)
MONOCYTES NFR BLD: 0.74 K/UL (ref 0.1–1.2)
MONOCYTES NFR BLD: 9 % (ref 3–12)
NEUTROPHILS NFR BLD: 68 % (ref 36–65)
NEUTS SEG NFR BLD: 5.6 K/UL (ref 1.5–8.1)
NRBC BLD-RTO: 0 PER 100 WBC
PLATELET # BLD AUTO: 103 K/UL (ref 138–453)
PMV BLD AUTO: 12.2 FL (ref 8.1–13.5)
POTASSIUM SERPL-SCNC: 4.6 MMOL/L (ref 3.7–5.3)
RBC # BLD AUTO: 2.89 M/UL (ref 3.95–5.11)
SODIUM SERPL-SCNC: 140 MMOL/L (ref 136–145)
WBC OTHER # BLD: 8.2 K/UL (ref 3.5–11.3)

## 2024-04-10 PROCEDURE — 7100000001 HC PACU RECOVERY - ADDTL 15 MIN: Performed by: ORTHOPAEDIC SURGERY

## 2024-04-10 PROCEDURE — 82947 ASSAY GLUCOSE BLOOD QUANT: CPT

## 2024-04-10 PROCEDURE — 2500000003 HC RX 250 WO HCPCS

## 2024-04-10 PROCEDURE — 6360000002 HC RX W HCPCS: Performed by: STUDENT IN AN ORGANIZED HEALTH CARE EDUCATION/TRAINING PROGRAM

## 2024-04-10 PROCEDURE — 6370000000 HC RX 637 (ALT 250 FOR IP): Performed by: STUDENT IN AN ORGANIZED HEALTH CARE EDUCATION/TRAINING PROGRAM

## 2024-04-10 PROCEDURE — 1200000000 HC SEMI PRIVATE

## 2024-04-10 PROCEDURE — 7100000000 HC PACU RECOVERY - FIRST 15 MIN: Performed by: ORTHOPAEDIC SURGERY

## 2024-04-10 PROCEDURE — 2580000003 HC RX 258

## 2024-04-10 PROCEDURE — 2580000003 HC RX 258: Performed by: ORTHOPAEDIC SURGERY

## 2024-04-10 PROCEDURE — C1769 GUIDE WIRE: HCPCS | Performed by: ORTHOPAEDIC SURGERY

## 2024-04-10 PROCEDURE — 6360000002 HC RX W HCPCS: Performed by: CHIROPRACTOR

## 2024-04-10 PROCEDURE — 6360000002 HC RX W HCPCS

## 2024-04-10 PROCEDURE — 73552 X-RAY EXAM OF FEMUR 2/>: CPT

## 2024-04-10 PROCEDURE — 85014 HEMATOCRIT: CPT

## 2024-04-10 PROCEDURE — 6370000000 HC RX 637 (ALT 250 FOR IP): Performed by: NURSE PRACTITIONER

## 2024-04-10 PROCEDURE — 85018 HEMOGLOBIN: CPT

## 2024-04-10 PROCEDURE — 80048 BASIC METABOLIC PNL TOTAL CA: CPT

## 2024-04-10 PROCEDURE — 36415 COLL VENOUS BLD VENIPUNCTURE: CPT

## 2024-04-10 PROCEDURE — 3700000001 HC ADD 15 MINUTES (ANESTHESIA): Performed by: ORTHOPAEDIC SURGERY

## 2024-04-10 PROCEDURE — 2709999900 HC NON-CHARGEABLE SUPPLY: Performed by: ORTHOPAEDIC SURGERY

## 2024-04-10 PROCEDURE — C1713 ANCHOR/SCREW BN/BN,TIS/BN: HCPCS | Performed by: ORTHOPAEDIC SURGERY

## 2024-04-10 PROCEDURE — 85025 COMPLETE CBC W/AUTO DIFF WBC: CPT

## 2024-04-10 PROCEDURE — 3600000004 HC SURGERY LEVEL 4 BASE: Performed by: ORTHOPAEDIC SURGERY

## 2024-04-10 PROCEDURE — 64447 NJX AA&/STRD FEMORAL NRV IMG: CPT | Performed by: STUDENT IN AN ORGANIZED HEALTH CARE EDUCATION/TRAINING PROGRAM

## 2024-04-10 PROCEDURE — 6370000000 HC RX 637 (ALT 250 FOR IP)

## 2024-04-10 PROCEDURE — 2720000010 HC SURG SUPPLY STERILE: Performed by: ORTHOPAEDIC SURGERY

## 2024-04-10 PROCEDURE — 3600000014 HC SURGERY LEVEL 4 ADDTL 15MIN: Performed by: ORTHOPAEDIC SURGERY

## 2024-04-10 PROCEDURE — 99232 SBSQ HOSP IP/OBS MODERATE 35: CPT | Performed by: SURGERY

## 2024-04-10 PROCEDURE — 3700000000 HC ANESTHESIA ATTENDED CARE: Performed by: ORTHOPAEDIC SURGERY

## 2024-04-10 PROCEDURE — 0QH636Z INSERTION OF INTRAMEDULLARY INTERNAL FIXATION DEVICE INTO RIGHT UPPER FEMUR, PERCUTANEOUS APPROACH: ICD-10-PCS | Performed by: ORTHOPAEDIC SURGERY

## 2024-04-10 PROCEDURE — 2580000003 HC RX 258: Performed by: STUDENT IN AN ORGANIZED HEALTH CARE EDUCATION/TRAINING PROGRAM

## 2024-04-10 DEVICE — 10MM / TI CANN FRN / PF 420MM / RIGHT - STERILE: Type: IMPLANTABLE DEVICE | Site: FEMUR | Status: FUNCTIONAL

## 2024-04-10 DEVICE — SCREW LK F/IM NAIL 5X72MM XL25 STERILE: Type: IMPLANTABLE DEVICE | Status: FUNCTIONAL

## 2024-04-10 RX ORDER — BUPIVACAINE HYDROCHLORIDE 5 MG/ML
INJECTION, SOLUTION EPIDURAL; INTRACAUDAL
Status: COMPLETED | OUTPATIENT
Start: 2024-04-10 | End: 2024-04-10

## 2024-04-10 RX ORDER — PROPOFOL 10 MG/ML
INJECTION, EMULSION INTRAVENOUS PRN
Status: DISCONTINUED | OUTPATIENT
Start: 2024-04-10 | End: 2024-04-10 | Stop reason: SDUPTHER

## 2024-04-10 RX ORDER — INSULIN LISPRO 100 [IU]/ML
0-4 INJECTION, SOLUTION INTRAVENOUS; SUBCUTANEOUS
Status: DISCONTINUED | OUTPATIENT
Start: 2024-04-11 | End: 2024-04-13 | Stop reason: HOSPADM

## 2024-04-10 RX ORDER — SODIUM CHLORIDE 9 MG/ML
INJECTION, SOLUTION INTRAVENOUS PRN
Status: DISCONTINUED | OUTPATIENT
Start: 2024-04-10 | End: 2024-04-10 | Stop reason: HOSPADM

## 2024-04-10 RX ORDER — PHENYLEPHRINE HCL IN 0.9% NACL 1 MG/10 ML
SYRINGE (ML) INTRAVENOUS PRN
Status: DISCONTINUED | OUTPATIENT
Start: 2024-04-10 | End: 2024-04-10 | Stop reason: SDUPTHER

## 2024-04-10 RX ORDER — ONDANSETRON 2 MG/ML
INJECTION INTRAMUSCULAR; INTRAVENOUS PRN
Status: DISCONTINUED | OUTPATIENT
Start: 2024-04-10 | End: 2024-04-10 | Stop reason: SDUPTHER

## 2024-04-10 RX ORDER — SCOLOPAMINE TRANSDERMAL SYSTEM 1 MG/1
1 PATCH, EXTENDED RELEASE TRANSDERMAL ONCE
Status: DISCONTINUED | OUTPATIENT
Start: 2024-04-10 | End: 2024-04-12

## 2024-04-10 RX ORDER — LIDOCAINE HYDROCHLORIDE 10 MG/ML
INJECTION, SOLUTION EPIDURAL; INFILTRATION; INTRACAUDAL; PERINEURAL PRN
Status: DISCONTINUED | OUTPATIENT
Start: 2024-04-10 | End: 2024-04-10 | Stop reason: SDUPTHER

## 2024-04-10 RX ORDER — MAGNESIUM HYDROXIDE 1200 MG/15ML
LIQUID ORAL CONTINUOUS PRN
Status: DISCONTINUED | OUTPATIENT
Start: 2024-04-10 | End: 2024-04-10 | Stop reason: HOSPADM

## 2024-04-10 RX ORDER — DEXTROSE AND SODIUM CHLORIDE 5; .45 G/100ML; G/100ML
INJECTION, SOLUTION INTRAVENOUS CONTINUOUS
Status: DISCONTINUED | OUTPATIENT
Start: 2024-04-10 | End: 2024-04-10

## 2024-04-10 RX ORDER — ASPIRIN 81 MG/1
81 TABLET ORAL 2 TIMES DAILY
Qty: 60 TABLET | Refills: 0 | Status: ON HOLD | OUTPATIENT
Start: 2024-04-10 | End: 2024-05-10

## 2024-04-10 RX ORDER — SODIUM CHLORIDE, SODIUM LACTATE, POTASSIUM CHLORIDE, CALCIUM CHLORIDE 600; 310; 30; 20 MG/100ML; MG/100ML; MG/100ML; MG/100ML
INJECTION, SOLUTION INTRAVENOUS CONTINUOUS PRN
Status: DISCONTINUED | OUTPATIENT
Start: 2024-04-10 | End: 2024-04-10 | Stop reason: SDUPTHER

## 2024-04-10 RX ORDER — SODIUM CHLORIDE 0.9 % (FLUSH) 0.9 %
5-40 SYRINGE (ML) INJECTION PRN
Status: DISCONTINUED | OUTPATIENT
Start: 2024-04-10 | End: 2024-04-10 | Stop reason: HOSPADM

## 2024-04-10 RX ORDER — EPHEDRINE SULFATE/0.9% NACL/PF 25 MG/5 ML
SYRINGE (ML) INTRAVENOUS PRN
Status: DISCONTINUED | OUTPATIENT
Start: 2024-04-10 | End: 2024-04-10 | Stop reason: SDUPTHER

## 2024-04-10 RX ORDER — FENTANYL CITRATE 50 UG/ML
25 INJECTION, SOLUTION INTRAMUSCULAR; INTRAVENOUS EVERY 5 MIN PRN
Status: DISCONTINUED | OUTPATIENT
Start: 2024-04-10 | End: 2024-04-10 | Stop reason: HOSPADM

## 2024-04-10 RX ORDER — INSULIN LISPRO 100 [IU]/ML
0-4 INJECTION, SOLUTION INTRAVENOUS; SUBCUTANEOUS NIGHTLY
Status: DISCONTINUED | OUTPATIENT
Start: 2024-04-10 | End: 2024-04-11

## 2024-04-10 RX ORDER — SODIUM CHLORIDE 0.9 % (FLUSH) 0.9 %
5-40 SYRINGE (ML) INJECTION EVERY 12 HOURS SCHEDULED
Status: DISCONTINUED | OUTPATIENT
Start: 2024-04-10 | End: 2024-04-10 | Stop reason: HOSPADM

## 2024-04-10 RX ORDER — DEXAMETHASONE SODIUM PHOSPHATE 10 MG/ML
INJECTION INTRAMUSCULAR; INTRAVENOUS PRN
Status: DISCONTINUED | OUTPATIENT
Start: 2024-04-10 | End: 2024-04-10 | Stop reason: SDUPTHER

## 2024-04-10 RX ORDER — FENTANYL CITRATE 50 UG/ML
100 INJECTION, SOLUTION INTRAMUSCULAR; INTRAVENOUS ONCE
Status: DISCONTINUED | OUTPATIENT
Start: 2024-04-10 | End: 2024-04-13 | Stop reason: HOSPADM

## 2024-04-10 RX ORDER — NALOXONE HYDROCHLORIDE 0.4 MG/ML
INJECTION, SOLUTION INTRAMUSCULAR; INTRAVENOUS; SUBCUTANEOUS PRN
Status: DISCONTINUED | OUTPATIENT
Start: 2024-04-10 | End: 2024-04-10 | Stop reason: HOSPADM

## 2024-04-10 RX ORDER — ENOXAPARIN SODIUM 100 MG/ML
30 INJECTION SUBCUTANEOUS 2 TIMES DAILY
Status: DISCONTINUED | OUTPATIENT
Start: 2024-04-10 | End: 2024-04-13 | Stop reason: HOSPADM

## 2024-04-10 RX ORDER — MAGNESIUM HYDROXIDE 1200 MG/15ML
LIQUID ORAL PRN
Status: DISCONTINUED | OUTPATIENT
Start: 2024-04-10 | End: 2024-04-10 | Stop reason: HOSPADM

## 2024-04-10 RX ORDER — ROCURONIUM BROMIDE 10 MG/ML
INJECTION, SOLUTION INTRAVENOUS PRN
Status: DISCONTINUED | OUTPATIENT
Start: 2024-04-10 | End: 2024-04-10 | Stop reason: SDUPTHER

## 2024-04-10 RX ORDER — SODIUM CHLORIDE 9 MG/ML
INJECTION, SOLUTION INTRAVENOUS CONTINUOUS
Status: DISCONTINUED | OUTPATIENT
Start: 2024-04-10 | End: 2024-04-11

## 2024-04-10 RX ORDER — MIDAZOLAM HYDROCHLORIDE 2 MG/2ML
2 INJECTION, SOLUTION INTRAMUSCULAR; INTRAVENOUS ONCE
Status: COMPLETED | OUTPATIENT
Start: 2024-04-10 | End: 2024-04-10

## 2024-04-10 RX ADMIN — DEXAMETHASONE SODIUM PHOSPHATE 10 MG: 10 INJECTION INTRAMUSCULAR; INTRAVENOUS at 09:00

## 2024-04-10 RX ADMIN — ROCURONIUM BROMIDE 10 MG: 10 SOLUTION INTRAVENOUS at 10:04

## 2024-04-10 RX ADMIN — EPHEDRINE SULFATE 5 MG: 5 INJECTION INTRAVENOUS at 10:19

## 2024-04-10 RX ADMIN — ONDANSETRON 4 MG: 2 INJECTION INTRAMUSCULAR; INTRAVENOUS at 11:35

## 2024-04-10 RX ADMIN — Medication 100 MCG: at 09:36

## 2024-04-10 RX ADMIN — INSULIN LISPRO 4 UNITS: 100 INJECTION, SOLUTION INTRAVENOUS; SUBCUTANEOUS at 20:34

## 2024-04-10 RX ADMIN — SODIUM CHLORIDE, POTASSIUM CHLORIDE, SODIUM LACTATE AND CALCIUM CHLORIDE: 600; 310; 30; 20 INJECTION, SOLUTION INTRAVENOUS at 08:39

## 2024-04-10 RX ADMIN — Medication 2000 MG: at 09:15

## 2024-04-10 RX ADMIN — ATORVASTATIN CALCIUM 10 MG: 20 TABLET, FILM COATED ORAL at 20:34

## 2024-04-10 RX ADMIN — EPHEDRINE SULFATE 5 MG: 5 INJECTION INTRAVENOUS at 10:13

## 2024-04-10 RX ADMIN — GABAPENTIN 100 MG: 100 CAPSULE ORAL at 06:02

## 2024-04-10 RX ADMIN — Medication 100 MCG: at 09:16

## 2024-04-10 RX ADMIN — ENOXAPARIN SODIUM 30 MG: 100 INJECTION SUBCUTANEOUS at 20:34

## 2024-04-10 RX ADMIN — Medication 100 MCG: at 10:25

## 2024-04-10 RX ADMIN — PROPOFOL 40 MG: 10 INJECTION, EMULSION INTRAVENOUS at 10:55

## 2024-04-10 RX ADMIN — LIDOCAINE HYDROCHLORIDE 50 MG: 10 INJECTION, SOLUTION EPIDURAL; INFILTRATION; INTRACAUDAL; PERINEURAL at 08:48

## 2024-04-10 RX ADMIN — ROCURONIUM BROMIDE 10 MG: 10 SOLUTION INTRAVENOUS at 10:48

## 2024-04-10 RX ADMIN — SODIUM CHLORIDE, SODIUM LACTATE, POTASSIUM CHLORIDE, CALCIUM CHLORIDE: 600; 310; 30; 20 INJECTION, SOLUTION INTRAVENOUS at 10:18

## 2024-04-10 RX ADMIN — Medication 100 MCG: at 09:27

## 2024-04-10 RX ADMIN — Medication 100 MCG: at 09:21

## 2024-04-10 RX ADMIN — LEVOTHYROXINE SODIUM 100 MCG: 100 TABLET ORAL at 06:02

## 2024-04-10 RX ADMIN — EPHEDRINE SULFATE 5 MG: 5 INJECTION INTRAVENOUS at 10:04

## 2024-04-10 RX ADMIN — HYDROMORPHONE HYDROCHLORIDE 0.25 MG: 1 INJECTION, SOLUTION INTRAMUSCULAR; INTRAVENOUS; SUBCUTANEOUS at 12:09

## 2024-04-10 RX ADMIN — SODIUM CHLORIDE, SODIUM LACTATE, POTASSIUM CHLORIDE, CALCIUM CHLORIDE: 600; 310; 30; 20 INJECTION, SOLUTION INTRAVENOUS at 08:46

## 2024-04-10 RX ADMIN — Medication 100 MCG: at 09:07

## 2024-04-10 RX ADMIN — EPHEDRINE SULFATE 5 MG: 5 INJECTION INTRAVENOUS at 11:13

## 2024-04-10 RX ADMIN — SODIUM CHLORIDE, PRESERVATIVE FREE 10 ML: 5 INJECTION INTRAVENOUS at 20:34

## 2024-04-10 RX ADMIN — ACETAMINOPHEN 1000 MG: 500 TABLET ORAL at 20:34

## 2024-04-10 RX ADMIN — SODIUM CHLORIDE: 9 INJECTION, SOLUTION INTRAVENOUS at 20:34

## 2024-04-10 RX ADMIN — MIDAZOLAM HYDROCHLORIDE 1 MG: 1 INJECTION, SOLUTION INTRAMUSCULAR; INTRAVENOUS at 08:20

## 2024-04-10 RX ADMIN — Medication 2000 MG: at 16:27

## 2024-04-10 RX ADMIN — SUGAMMADEX 200 MG: 100 INJECTION, SOLUTION INTRAVENOUS at 11:41

## 2024-04-10 RX ADMIN — DEXTROSE AND SODIUM CHLORIDE: 5; 450 INJECTION, SOLUTION INTRAVENOUS at 19:16

## 2024-04-10 RX ADMIN — ACETAMINOPHEN 1000 MG: 500 TABLET ORAL at 06:02

## 2024-04-10 RX ADMIN — ACETAMINOPHEN 1000 MG: 500 TABLET ORAL at 12:58

## 2024-04-10 RX ADMIN — PROPOFOL 140 MG: 10 INJECTION, EMULSION INTRAVENOUS at 08:49

## 2024-04-10 RX ADMIN — SODIUM CHLORIDE: 9 INJECTION, SOLUTION INTRAVENOUS at 06:02

## 2024-04-10 RX ADMIN — Medication 100 MCG: at 09:13

## 2024-04-10 RX ADMIN — Medication 100 MCG: at 11:01

## 2024-04-10 RX ADMIN — GABAPENTIN 100 MG: 100 CAPSULE ORAL at 20:34

## 2024-04-10 RX ADMIN — ROCURONIUM BROMIDE 50 MG: 10 SOLUTION INTRAVENOUS at 08:49

## 2024-04-10 RX ADMIN — BUPIVACAINE HYDROCHLORIDE 35 ML: 5 INJECTION, SOLUTION EPIDURAL; INTRACAUDAL; PERINEURAL at 08:05

## 2024-04-10 RX ADMIN — GABAPENTIN 100 MG: 100 CAPSULE ORAL at 14:55

## 2024-04-10 ASSESSMENT — PAIN SCALES - GENERAL
PAINLEVEL_OUTOF10: 2
PAINLEVEL_OUTOF10: 4
PAINLEVEL_OUTOF10: 7
PAINLEVEL_OUTOF10: 5
PAINLEVEL_OUTOF10: 0
PAINLEVEL_OUTOF10: 4
PAINLEVEL_OUTOF10: 5

## 2024-04-10 ASSESSMENT — PAIN DESCRIPTION - ORIENTATION
ORIENTATION: RIGHT

## 2024-04-10 ASSESSMENT — PAIN DESCRIPTION - DESCRIPTORS
DESCRIPTORS: SHARP
DESCRIPTORS: SHARP
DESCRIPTORS: DISCOMFORT
DESCRIPTORS: SHARP

## 2024-04-10 ASSESSMENT — PAIN DESCRIPTION - LOCATION
LOCATION: HIP

## 2024-04-10 ASSESSMENT — PAIN - FUNCTIONAL ASSESSMENT
PAIN_FUNCTIONAL_ASSESSMENT: 0-10
PAIN_FUNCTIONAL_ASSESSMENT: PREVENTS OR INTERFERES SOME ACTIVE ACTIVITIES AND ADLS

## 2024-04-10 NOTE — PLAN OF CARE
Problem: Pain  Goal: Verbalizes/displays adequate comfort level or baseline comfort level  4/10/2024 1719 by Bebeto Richardson RN  Outcome: Progressing  4/10/2024 0522 by Sandi Card RN  Outcome: Progressing     Problem: Skin/Tissue Integrity  Goal: Absence of new skin breakdown  Description: 1.  Monitor for areas of redness and/or skin breakdown  2.  Assess vascular access sites hourly  3.  Every 4-6 hours minimum:  Change oxygen saturation probe site  4.  Every 4-6 hours:  If on nasal continuous positive airway pressure, respiratory therapy assess nares and determine need for appliance change or resting period.  4/10/2024 1719 by Bebeto Richardson RN  Outcome: Progressing  4/10/2024 0522 by Sandi Card RN  Outcome: Progressing     Problem: Safety - Adult  Goal: Free from fall injury  4/10/2024 1719 by Bebeto Richardson RN  Outcome: Progressing  4/10/2024 0522 by Sandi Card RN  Outcome: Progressing     Problem: ABCDS Injury Assessment  Goal: Absence of physical injury  4/10/2024 1719 by Bebeto Richardson RN  Outcome: Progressing  4/10/2024 0522 by Sandi Card RN  Outcome: Progressing     Problem: Discharge Planning  Goal: Discharge to home or other facility with appropriate resources  4/10/2024 1719 by Bebeto Richardson RN  Outcome: Progressing  4/10/2024 0522 by Sandi Card RN  Outcome: Progressing

## 2024-04-10 NOTE — PROGRESS NOTES
Orthopedic Progress Note     Patient:  Dariana Juan  YOB: 1955     68 y.o. female    Subjective  Patient seen and examined.  No complaints or concerns.  No issue overnight.  Pain controlled.  Denies fever, HA, CP, SOB, N/V, dysuria.  Plan for OR tomorrow, 4/10/24, with Dr. Caruso.    Vitals reviewed, afebrile.    Objective  Vitals:    04/10/24 0749   BP: 107/68   Pulse: 83   Resp: 16   Temp: 98.2 °F (36.8 °C)   SpO2: 97%     Gen: NAD, Cooperative.   Cardiovascular: Regular Rate  Respiratory: No Acute Respiratory Distress  MSK:  LLE   Optifoam dressings clean/dry/intact. Compartments soft. EHL/FHL/TA/GSC motor intact. Sural, Saphenous, Superficial/Deep Peroneal, and Plantar Nerve distribution SILT. Foot warm and well perfused.    RLE  No ecchymoses, abrasions, deformity, or lacerations. Skin intact. Tender to palpation about proximal thigh. Compartments soft. EHL/FHL/TA/GSC motor intact. Sural, Saphenous, Superficial/Deep Peroneal, and Plantar Nerve distribution SILT. Foot warm and well perfused.    Recent Labs     04/07/24  1444 04/08/24  1324 04/10/24  0353   WBC 7.8   < > 8.2   HGB 10.6*   < > 8.3*   HCT 32.9*   < > 26.8*      < > 103*   INR 1.0  --   --       < > 140   K 4.4   < > 4.6   BUN 26*   < > 22   CREATININE 1.0*   < > 0.9   GLUCOSE 176*   < > 165*    < > = values in this interval not displayed.      Meds: See Rec for Complete List    Impression 68 y.o. female being seen for     - Left subtrochanteric femur fracture s/p IMN, DOS: 4/8/24  - Right femoral shaft stress fracture    Plan  - To OR with Dr. Caruso tomorrow, 4/10/24   - NPO 4/10/24 @ 0001  - Ancef OCTOR   - DVT ppx: please hold 4/10/24 in anticipation for surgery  - WB status: WBAT LLE, NWB RLE  - Optifoam dressings about LLE, please maintain and reinforce as needed.  - Ice for Pain and Swelling  - Please page the On-Call-Ortho-Resident with any questions.  _________________________________  Carlos Bearden,  JULIETA.  Orthopedic Surgery Resident, PGY-2  [place], [place]    Please excuse any typos/errors, as this note was created with the assistance of voice recognition software. While intending to generate a document that actually reflects the content of the visit, the document can still have some errors including those of syntax and sound-a-like substitutions which may escape proof reading. In such instances, actual meaning can be extrapolated by context.

## 2024-04-10 NOTE — PROGRESS NOTES
PROGRESS NOTE          PATIENT NAME: Dariana Juan  MEDICAL RECORD NO. [id number]  DATE: 4/10/2024    HD: # 3      Patient Active Problem List   Diagnosis    Left displaced femoral neck fracture (HCC)    Closed fracture of left femur (HCC)       DIAGNOSIS AND PLAN    Left femur fracture s/p l IMN 4/8/2024  Right femur stress fracture- plan for OR 4/10  Urinary retention    Maintain romero   Cr improved to 0.9 today   [facility]   Strict record intake output   UA- no uti   Lovenox bid  Low dose sliding scale insulin started- hg A1c 6.5  Carb control diet  MMPT  Pt/ot      Chief Complaint: \"Im going to surgery today, I slept goo though\"    SUBJECTIVE    Patient seen and examined. Af, vitals within normal. Pain controlled. UO adequate.     OBJECTIVE  VITALS:   Vitals:    04/10/24 0815   BP: 115/72   Pulse: 87   Resp: 17   Temp:    SpO2: 97%     Physical Exam  Constitutional:       Appearance: She is normal weight.   HENT:      Head: Normocephalic and atraumatic.      Right Ear: External ear normal.      Left Ear: External ear normal.      Nose: Nose normal.      Mouth/Throat:      Mouth: Mucous membranes are moist.   Cardiovascular:      Rate and Rhythm: Normal rate and regular rhythm.      Pulses: Normal pulses.   Pulmonary:      Effort: Pulmonary effort is normal. No respiratory distress.   Abdominal:      General: There is no distension.      Palpations: Abdomen is soft.      Tenderness: There is no abdominal tenderness.   Musculoskeletal:      Cervical back: Normal range of motion and neck supple. No rigidity.      Comments: Left lower extremity splinted, sensation in tact and good cap refill   Skin:     General: Skin is warm.      Capillary Refill: Capillary refill takes less than 2 seconds.      Coloration: Skin is not jaundiced.      Findings: No bruising.   Neurological:      General: No focal deficit present.      Mental Status: She is alert and oriented to person, place, and time.      Cranial Nerves: No

## 2024-04-10 NOTE — OP NOTE
Operative Note      Patient: Dariana Juan  YOB: 1955  MRN: [id number]    Date of Procedure: 4/10/2024    Pre-Op Diagnosis Codes:     * Stress fracture of shaft of right femur, initial encounter [M84.351A]    Post-Op Diagnosis:   - Right femoral stress fracture, bisphosphonate related        Procedure(s):   - Right femur prophylactic intramedullary nail fixation     Surgeon(s):  Patricio Caruso DO    Assistant:   Resident: Justino Brown DO; Madyson Crocker DO    Anesthesia: General    Estimated Blood Loss (mL): 10     IVF (mL): 150     Complications: None    Specimens:   * No specimens in log *    Implants:  Implant Name Type Inv. Item Serial No.  Lot No. LRB No. Used Action   NAIL-EX FEM RECON [id number] RT PRFMIS FOSSA STRL - [id number]  NAIL-EX FEM RECON [id number] RT PRFMIS FOSSA STRL  [employer] [id number] Right 1 Implanted   SCREW BNE HIP 6.5X95 MM RECON THRD XL40 TI STRL FRNADVANCED - [id number]  SCREW BNE HIP 6.5X95 MM RECON THRD XL40 TI STRL FRNADVANCED  DEPU[employer]-WD [id number] Right 1 Implanted   SCREW BNE HIP 6.5X100 MM RECON XL40 RECESS IM NAIL TI STRL - [id number]  SCREW BNE HIP 6.5X100 MM RECON XL40 RECESS IM NAIL TI STRL  [employer]WD [id number] Right 1 Implanted   SCREW LK F/IM NAIL 5X72MM XL25 STERILE - [id number]  SCREW LK F/IM NAIL 5X72MM XL25 STERILE  [employer]-WD [id number] Right 1 Implanted         Drains:   Urinary Catheter 04/09/24 Quintero (Active)   Catheter Indications Urinary retention (acute or chronic), continuous bladder irrigation or bladder outlet obstruction 04/10/24 0000   Site Assessment Pink 04/10/24 0000   Urine Color Yellow 04/10/24 0000   Urine Appearance Clear 04/10/24 0000   Urine Odor Other (Comment) 04/09/24 2000   Collection Container Standard 04/10/24 0000   Catheter Care  Soap and water 04/09/24 2000   Catheter Best Practices  Tamper seal intact;Bag not on floor;Bag below bladder;Lack of dependent loop in tubing;Drainage bag  appropriate preoperative clearance/risk stratification from the primary and/or consulting services.     Procedure:  The patient was transported to the operating room and general anesthesia was administered by the anesthesia providers without complication. The patient was then transferred to a fracture table in a supine position. All bony prominences were well padded and the patient was adequately secured to the bed.  The ipsilateral upper extremity was secured over the chest and well padded. The right lower extremity was isolated, scrubbed with Hibiclens followed by alcohol and then prepped and draped in the usual sterile fashion. A timeout was performed that included all involved parties that confirmed the correct patient, operative site, and procedure. The patient received weight based antibiotics in compliance with their allergy profile. After everyone agreed we continued.    Fluoroscopy was utilized to assess the femoral stress fracture which was noted to be aligned anatomically.    We then percutaneously inserted the starting guidewire inline with the intramedullary canal on both the AP and lateral views of the hip. It was then advanced into the proximal femur. An incision was made over the guidewire to allow insertion of the opening reamer and soft tissue sleeve. It was advanced over the wire to approximately the level of the lesser trochanter. Everything was removed and exchanged for the ball-tipped guidewire. It was advanced through the intramedullary canal and nail length was measured. Reamers were sequentially increased in size until adequate chatter was achieved.  We elected to ream 1.5 mm over the desired nail diameter.    The nail was then inserted over the guidewire to the desired depth and rotation.  Lateral incision was made on the thigh to allow for insertion of the trocars for the recon screws.  The guidewires were placed to the desired location in the femoral head and neck and advanced into the

## 2024-04-10 NOTE — ANESTHESIA PROCEDURE NOTES
Peripheral Block    Patient location during procedure: pre-op  Reason for block: post-op pain management  Start time: 4/10/2024 8:05 AM  End time: 4/10/2024 8:15 AM  Staffing  Performed: anesthesiologist   Anesthesiologist: Pedro Mann MD  Performed by: Pedro Mann MD  Authorized by: Pedro Mann MD    Preanesthetic Checklist  Completed: patient identified, IV checked, site marked, risks and benefits discussed, surgical/procedural consents, equipment checked, pre-op evaluation, timeout performed, anesthesia consent given, oxygen available, monitors applied/VS acknowledged, fire risk safety assessment completed and verbalized and blood product R/B/A discussed and consented  Peripheral Block   Patient position: supine  Prep: ChloraPrep  Provider prep: mask and sterile gloves  Patient monitoring: cardiac monitor, continuous pulse ox, frequent blood pressure checks, IV access, oxygen and responsive to questions  Block type: Femoral and Fascia iliaca  Laterality: right  Injection technique: single-shot  Guidance: ultrasound guided    Needle   Needle type: insulated echogenic nerve stimulator needle   Needle localization: ultrasound guidance  Assessment   Injection assessment: negative aspiration for heme, no paresthesia on injection, local visualized surrounding nerve on ultrasound and no intravascular symptoms  Paresthesia pain: none  Slow fractionated injection: yes  Hemodynamics: stable  Outcomes: uncomplicated and patient tolerated procedure well    Medications Administered  BUPivacaine (MARCAINE) PF injection 0.5% - Perineural   35 mL - 4/10/2024 8:05:00 AM

## 2024-04-10 NOTE — ANESTHESIA POSTPROCEDURE EVALUATION
Department of Anesthesiology  Postprocedure Note    Patient: Dariana Juan  MRN: [id number]  YOB: 1955  Date of evaluation: 4/10/2024    Procedure Summary       Date: 04/10/24 Room / Location: [address]    Anesthesia Start: 0840 Anesthesia Stop: 1153    Procedure: PROPHYLACTIC INTRAMEDULLARY NAIL INSERTION FEMUR (Right: Leg Upper) Diagnosis:       Stress fracture of shaft of right femur, initial encounter      (Stress fracture of shaft of right femur, initial encounter [M84.351A])    Surgeons: Patricio Caruso DO Responsible Provider: Pedro Mann MD    Anesthesia Type: general ASA Status: 2            Anesthesia Type: No value filed.    Tk Phase I: Tk Score: 10    Tk Phase II:      Anesthesia Post Evaluation    Patient location during evaluation: bedside  Patient participation: complete - patient participated  Level of consciousness: awake  Airway patency: patent  Nausea & Vomiting: no nausea and no vomiting  Cardiovascular status: hemodynamically stable  Respiratory status: acceptable  Hydration status: stable  Comments: BP (!) 118/56   Pulse 90   Temp 97.7 °F (36.5 °C) (Temporal)   Resp 15   Ht 1.727 m (5' 8\")   Wt 65 kg (143 lb 4.8 oz)   SpO2 92%   BMI 21.79 kg/m²       No notable events documented.

## 2024-04-10 NOTE — ANESTHESIA PRE PROCEDURE
PROT 6.4 04/07/2024 02:44 PM    CALCIUM 8.2 04/10/2024 03:53 AM    BILITOT 0.6 04/07/2024 02:44 PM    ALKPHOS 48 04/07/2024 02:44 PM    AST 35 04/07/2024 02:44 PM    ALT 33 04/07/2024 02:44 PM       POC Tests:   Recent Labs     04/10/24  0709   POCGLU 169*       Coags:   Lab Results   Component Value Date/Time    PROTIME 12.7 04/07/2024 02:44 PM    INR 1.0 04/07/2024 02:44 PM    APTT <20.0 04/07/2024 02:44 PM       HCG (If Applicable): No results found for: \"PREGTESTUR\", \"PREGSERUM\", \"HCG\", \"HCGQUANT\"     ABGs: No results found for: \"PHART\", \"PO2ART\", \"GUH7FAE\", \"RYL6KGY\", \"BEART\", \"J3IWDXKT\"     Type & Screen (If Applicable):  No results found for: \"LABABO\", \"LABRH\"    Drug/Infectious Status (If Applicable):  No results found for: \"HIV\", \"HEPCAB\"    COVID-19 Screening (If Applicable): No results found for: \"COVID19\"        Anesthesia Evaluation  Patient summary reviewed and Nursing notes reviewed   no history of anesthetic complications:   Airway: Mallampati: III  TM distance: >3 FB   Neck ROM: full  Mouth opening: > = 3 FB   Dental:    (+) poor dentition      Pulmonary:Negative Pulmonary ROS and normal exam                               Cardiovascular:    (+) hyperlipidemia                  Neuro/Psych:               GI/Hepatic/Renal: Neg GI/Hepatic/Renal ROS            Endo/Other:    (+) hypothyroidism::..                 Abdominal:             Vascular:          Other Findings:           Anesthesia Plan      general     ASA 2     (Nerve block)  Induction: intravenous.    MIPS: Postoperative opioids intended, Prophylactic antiemetics administered and Postoperative trial extubation.  Anesthetic plan and risks discussed with patient.      Plan discussed with CRNA.          Post-op pain plan if not by surgeon: single peripheral nerve block          Pedro Mann MD   4/10/2024

## 2024-04-10 NOTE — PROGRESS NOTES
Physical Therapy        Physical Therapy Cancel Note      DATE: 4/10/2024    NAME: Dariana Juan  MRN: [id number]   : 1955      Patient not seen this date for Physical Therapy due to:    Surgery/Procedure: OR with ortho, will check back post-op.       Electronically signed by Clayton Moss PT on 4/10/2024 at 10:21 AM

## 2024-04-10 NOTE — OP NOTE
confirmed the correct patient, operative site, and procedure. The patient receive weight based antibiotics in compliance with their allergy profile. After everyone agreed we continued.    To correct the current malalignment, the leg was flexed in order to match the deformity of the proximal segment.  In order to avoid varus malalignment due to the thickening of the lateral cortex and this disease process, blocking wires were placed from anterior to posterior along the medial aspect of the intramedullary canal just proximal and distal to the fracture site.    We then percutaneously inserted the starting guidewire inline with the intramedullary canal on both the AP and lateral views of the hip. It was then advanced into the proximal femur. An incision was made over the guidewire to allow insertion of the opening reamer and soft tissue sleeve. It was advanced over the wire to approximately the level of the lesser trochanter. Everything was removed and exchanged for the ball-tipped guidewire. It was advanced through the intramedullary canal and nail length was measured. Reamers were sequentially increased in size until adequate chatter was achieved.  We elected to ream 2 mm over the desired nail diameter.    The nail was then inserted over the guidewire to the desired depth and rotation.  Lateral incision was made on the thigh to allow for insertion of the trocars for the recon screws.  The guidewires were placed to the desired location in the femoral head and neck and advanced into the subchondral bone.  We then followed the appropriate steps to measure drill and insert the recon screws. We used an approach withdrawal technique with fluoroscopy to assure the screws did not violate the joint.      Rotation was confirmed to be within the acceptable range for population norms by obtaining a perfect lateral view of the knee with the C-arm at 0 degrees followed by a perfect lateral view of the femoral neck. The angle of the  C-arm in the lateral view will represent the anteversion. We then inserted the distal interlocking screws through a percutaneous incisions and perfect [place] IRA technique.    All components of the skeletal traction were removed, including the traction pin, as well as the insertion handle of the nail.  Final AP and lateral radiographs of the entire femur demonstrated acceptable reduction and safe and appropriate implant placement in all views.  The wounds were copiously irrigated with normal saline solution the soft tissues were then closed in a standard layered fashion absorbable sutures and the incision sites closed with staples.  The field was cleaned and dried and sterile bandages were applied.  The patient was successfully extubated by the anesthesia providers with no known complications and transferred to the recovery room.    Postoperative plan:  We will obtain digital x-rays of the operative femur in the recovery room.  The patient will be placed on 23 hours of prophylactic antibiotics and may resume DVT prophylaxis on postoperative day 1 if felt to be appropriate by the primary service. She will be permitted to begin weightbearing as tolerated immediately.  Will obtain radiographs of the contralateral femur to rule out impending stress fracture.  Will be evaluated by Physical and Occupational Therapy.  Following discharge from the hospital the patient will be evaluated in the orthopedic trauma clinic approximately 10 to 14 days from the date of today's procedure for a wound check and anticipated staple removal if healing appropriately at that time.     Electronically signed by Patricio Caruso DO on 4/10/2024 at 7:21 AM

## 2024-04-10 NOTE — PROGRESS NOTES
Orthopedic Progress Note    Patient:  Dariana Juan  YOB: 1955     68 y.o. female    Subjective:  Patient seen and examined this morning. No complaints or concerns. No issues overnight per nursing.  Discussed bisphosphonates and the pathological fractures that are seen with them.  Also discussed the surgical procedure for the contralateral leg being similar to the postsurgical leg.  She demonstrated good understanding of both. Pain is well controlled on current regimen. Denies fever, HA, CP, SOB, N/V, dysuria, new numbness/tingling.  Was not evaluated by PT/OT due to OR schedule today 4/7 will be evaluated postoperatively.    Vitals reviewed, afebrile    Objective:   Vitals:    04/10/24 0404   BP: 119/66   Pulse: 89   Resp: 14   Temp: 98.6 °F (37 °C)   SpO2: 95%     Gen: NAD, cooperative    Cardiovascular: Regular rate   Respiratory: No acute respiratory distress, breathing comfortably on room air    Orthopedic Exam    Skin intact.      LLE: Optifoam dressings on left lower extremity.  They are clean, dry and intact.  Mild tenderness to palpate at surgical sites.  Compartments are soft and easily compressible.  EHL/FHL/TA/GSC motor intact. Sensation intact to sural/saph/SPN/DPN distribution. DP/PT pulses 2+.     RLE: Skin intact.  No ecchymosis, abrasion, laceration or lesions noted.  Compartments are soft and easy compressible.  No tenderness in the calf.  EHL/FHL/TA/GSC complex motor intact.  Sensation intact to light touch from L2-S1.  DP/PT +2 with BCR.      Recent Labs     04/07/24  1444 04/08/24  1324 04/10/24  0353   WBC 7.8   < > 8.2   HGB 10.6*   < > 8.3*   HCT 32.9*   < > 26.8*      < > 103*   INR 1.0  --   --       < > 140   K 4.4   < > 4.6   BUN 26*   < > 22   CREATININE 1.0*   < > 0.9   GLUCOSE 176*   < > 165*    < > = values in this interval not displayed.      Meds:   Abx: Ancef on-call to the OR, postop Ancef  DVT ppx: Currently on heparin 5000 units 3 times

## 2024-04-11 ENCOUNTER — APPOINTMENT (OUTPATIENT)
Dept: GENERAL RADIOLOGY | Age: 69
End: 2024-04-11
Payer: MEDICARE

## 2024-04-11 LAB
ANION GAP SERPL CALCULATED.3IONS-SCNC: 10 MMOL/L (ref 9–16)
BASOPHILS # BLD: <0.03 K/UL (ref 0–0.2)
BASOPHILS NFR BLD: 0 % (ref 0–2)
BUN SERPL-MCNC: 20 MG/DL (ref 8–23)
CALCIUM SERPL-MCNC: 7.8 MG/DL (ref 8.6–10.4)
CHLORIDE SERPL-SCNC: 106 MMOL/L (ref 98–107)
CO2 SERPL-SCNC: 23 MMOL/L (ref 20–31)
CREAT SERPL-MCNC: 0.8 MG/DL (ref 0.5–0.9)
EOSINOPHIL # BLD: <0.03 K/UL (ref 0–0.44)
EOSINOPHILS RELATIVE PERCENT: 0 % (ref 1–4)
ERYTHROCYTE [DISTWIDTH] IN BLOOD BY AUTOMATED COUNT: 13 % (ref 11.8–14.4)
GFR SERPL CREATININE-BSD FRML MDRD: 76 ML/MIN/1.73M2
GLUCOSE BLD-MCNC: 164 MG/DL (ref 65–105)
GLUCOSE BLD-MCNC: 170 MG/DL (ref 65–105)
GLUCOSE BLD-MCNC: 188 MG/DL (ref 65–105)
GLUCOSE BLD-MCNC: 189 MG/DL (ref 65–105)
GLUCOSE BLD-MCNC: 204 MG/DL (ref 65–105)
GLUCOSE SERPL-MCNC: 214 MG/DL (ref 74–99)
HCT VFR BLD AUTO: 21.6 % (ref 36.3–47.1)
HGB BLD-MCNC: 6.9 G/DL (ref 11.9–15.1)
IMM GRANULOCYTES # BLD AUTO: 0.09 K/UL (ref 0–0.3)
IMM GRANULOCYTES NFR BLD: 1 %
LYMPHOCYTES NFR BLD: 1.2 K/UL (ref 1.1–3.7)
LYMPHOCYTES RELATIVE PERCENT: 14 % (ref 24–43)
MCH RBC QN AUTO: 29.6 PG (ref 25.2–33.5)
MCHC RBC AUTO-ENTMCNC: 31.9 G/DL (ref 28.4–34.8)
MCV RBC AUTO: 92.7 FL (ref 82.6–102.9)
MONOCYTES NFR BLD: 0.72 K/UL (ref 0.1–1.2)
MONOCYTES NFR BLD: 9 % (ref 3–12)
NEUTROPHILS NFR BLD: 76 % (ref 36–65)
NEUTS SEG NFR BLD: 6.37 K/UL (ref 1.5–8.1)
NRBC BLD-RTO: 0 PER 100 WBC
PLATELET # BLD AUTO: 118 K/UL (ref 138–453)
PMV BLD AUTO: 12.2 FL (ref 8.1–13.5)
POTASSIUM SERPL-SCNC: 4.7 MMOL/L (ref 3.7–5.3)
RBC # BLD AUTO: 2.33 M/UL (ref 3.95–5.11)
SODIUM SERPL-SCNC: 139 MMOL/L (ref 136–145)
WBC OTHER # BLD: 8.4 K/UL (ref 3.5–11.3)

## 2024-04-11 PROCEDURE — 86900 BLOOD TYPING SEROLOGIC ABO: CPT

## 2024-04-11 PROCEDURE — P9016 RBC LEUKOCYTES REDUCED: HCPCS

## 2024-04-11 PROCEDURE — 6370000000 HC RX 637 (ALT 250 FOR IP)

## 2024-04-11 PROCEDURE — 72220 X-RAY EXAM SACRUM TAILBONE: CPT

## 2024-04-11 PROCEDURE — 6370000000 HC RX 637 (ALT 250 FOR IP): Performed by: STUDENT IN AN ORGANIZED HEALTH CARE EDUCATION/TRAINING PROGRAM

## 2024-04-11 PROCEDURE — 99232 SBSQ HOSP IP/OBS MODERATE 35: CPT | Performed by: SURGERY

## 2024-04-11 PROCEDURE — 72100 X-RAY EXAM L-S SPINE 2/3 VWS: CPT

## 2024-04-11 PROCEDURE — 1200000000 HC SEMI PRIVATE

## 2024-04-11 PROCEDURE — 51701 INSERT BLADDER CATHETER: CPT

## 2024-04-11 PROCEDURE — 86850 RBC ANTIBODY SCREEN: CPT

## 2024-04-11 PROCEDURE — 80048 BASIC METABOLIC PNL TOTAL CA: CPT

## 2024-04-11 PROCEDURE — 97535 SELF CARE MNGMENT TRAINING: CPT

## 2024-04-11 PROCEDURE — 97166 OT EVAL MOD COMPLEX 45 MIN: CPT

## 2024-04-11 PROCEDURE — 30233N1 TRANSFUSION OF NONAUTOLOGOUS RED BLOOD CELLS INTO PERIPHERAL VEIN, PERCUTANEOUS APPROACH: ICD-10-PCS | Performed by: ORTHOPAEDIC SURGERY

## 2024-04-11 PROCEDURE — 99232 SBSQ HOSP IP/OBS MODERATE 35: CPT | Performed by: PHYSICAL MEDICINE & REHABILITATION

## 2024-04-11 PROCEDURE — 51798 US URINE CAPACITY MEASURE: CPT

## 2024-04-11 PROCEDURE — 85025 COMPLETE CBC W/AUTO DIFF WBC: CPT

## 2024-04-11 PROCEDURE — 97162 PT EVAL MOD COMPLEX 30 MIN: CPT

## 2024-04-11 PROCEDURE — 86901 BLOOD TYPING SEROLOGIC RH(D): CPT

## 2024-04-11 PROCEDURE — 36415 COLL VENOUS BLD VENIPUNCTURE: CPT

## 2024-04-11 PROCEDURE — 82947 ASSAY GLUCOSE BLOOD QUANT: CPT

## 2024-04-11 PROCEDURE — 36430 TRANSFUSION BLD/BLD COMPNT: CPT

## 2024-04-11 PROCEDURE — 2580000003 HC RX 258: Performed by: STUDENT IN AN ORGANIZED HEALTH CARE EDUCATION/TRAINING PROGRAM

## 2024-04-11 PROCEDURE — 6360000002 HC RX W HCPCS: Performed by: STUDENT IN AN ORGANIZED HEALTH CARE EDUCATION/TRAINING PROGRAM

## 2024-04-11 PROCEDURE — 97530 THERAPEUTIC ACTIVITIES: CPT

## 2024-04-11 PROCEDURE — 86920 COMPATIBILITY TEST SPIN: CPT

## 2024-04-11 RX ORDER — METHOCARBAMOL 750 MG/1
750 TABLET, FILM COATED ORAL EVERY 6 HOURS
Status: DISCONTINUED | OUTPATIENT
Start: 2024-04-11 | End: 2024-04-13 | Stop reason: HOSPADM

## 2024-04-11 RX ORDER — SENNA AND DOCUSATE SODIUM 50; 8.6 MG/1; MG/1
2 TABLET, FILM COATED ORAL DAILY PRN
Status: DISCONTINUED | OUTPATIENT
Start: 2024-04-11 | End: 2024-04-13 | Stop reason: HOSPADM

## 2024-04-11 RX ORDER — SODIUM CHLORIDE 9 MG/ML
INJECTION, SOLUTION INTRAVENOUS PRN
Status: DISCONTINUED | OUTPATIENT
Start: 2024-04-11 | End: 2024-04-12

## 2024-04-11 RX ADMIN — OXYCODONE HYDROCHLORIDE 5 MG: 5 SOLUTION ORAL at 10:32

## 2024-04-11 RX ADMIN — METHOCARBAMOL 750 MG: 750 TABLET ORAL at 14:59

## 2024-04-11 RX ADMIN — GABAPENTIN 100 MG: 100 CAPSULE ORAL at 14:59

## 2024-04-11 RX ADMIN — Medication 2000 MG: at 00:01

## 2024-04-11 RX ADMIN — ENOXAPARIN SODIUM 30 MG: 100 INJECTION SUBCUTANEOUS at 08:18

## 2024-04-11 RX ADMIN — GABAPENTIN 100 MG: 100 CAPSULE ORAL at 06:05

## 2024-04-11 RX ADMIN — METHOCARBAMOL 750 MG: 750 TABLET ORAL at 08:18

## 2024-04-11 RX ADMIN — INSULIN LISPRO 2 UNITS: 100 INJECTION, SOLUTION INTRAVENOUS; SUBCUTANEOUS at 18:17

## 2024-04-11 RX ADMIN — METHOCARBAMOL 750 MG: 750 TABLET ORAL at 20:00

## 2024-04-11 RX ADMIN — GABAPENTIN 100 MG: 100 CAPSULE ORAL at 21:14

## 2024-04-11 RX ADMIN — ATORVASTATIN CALCIUM 10 MG: 20 TABLET, FILM COATED ORAL at 21:14

## 2024-04-11 RX ADMIN — SODIUM CHLORIDE, PRESERVATIVE FREE 10 ML: 5 INJECTION INTRAVENOUS at 21:15

## 2024-04-11 RX ADMIN — ACETAMINOPHEN 1000 MG: 500 TABLET ORAL at 16:56

## 2024-04-11 RX ADMIN — ACETAMINOPHEN 1000 MG: 500 TABLET ORAL at 06:05

## 2024-04-11 RX ADMIN — ENOXAPARIN SODIUM 30 MG: 100 INJECTION SUBCUTANEOUS at 21:15

## 2024-04-11 RX ADMIN — POLYETHYLENE GLYCOL 3350 17 G: 17 POWDER, FOR SOLUTION ORAL at 08:18

## 2024-04-11 RX ADMIN — ACETAMINOPHEN 1000 MG: 500 TABLET ORAL at 21:14

## 2024-04-11 RX ADMIN — LEVOTHYROXINE SODIUM 100 MCG: 100 TABLET ORAL at 06:05

## 2024-04-11 ASSESSMENT — PAIN DESCRIPTION - DESCRIPTORS
DESCRIPTORS: SORE
DESCRIPTORS: SORE
DESCRIPTORS: DISCOMFORT;SORE
DESCRIPTORS: SORE

## 2024-04-11 ASSESSMENT — PAIN - FUNCTIONAL ASSESSMENT
PAIN_FUNCTIONAL_ASSESSMENT: PREVENTS OR INTERFERES SOME ACTIVE ACTIVITIES AND ADLS

## 2024-04-11 ASSESSMENT — PAIN DESCRIPTION - LOCATION
LOCATION: HIP
LOCATION: LEG
LOCATION: LEG
LOCATION: LEG;BACK
LOCATION: HIP

## 2024-04-11 ASSESSMENT — PAIN SCALES - GENERAL
PAINLEVEL_OUTOF10: 0
PAINLEVEL_OUTOF10: 1
PAINLEVEL_OUTOF10: 1
PAINLEVEL_OUTOF10: 7
PAINLEVEL_OUTOF10: 2
PAINLEVEL_OUTOF10: 1

## 2024-04-11 ASSESSMENT — PAIN DESCRIPTION - ORIENTATION
ORIENTATION: RIGHT;LEFT
ORIENTATION: RIGHT;LEFT
ORIENTATION: RIGHT
ORIENTATION: RIGHT;LEFT
ORIENTATION: RIGHT;LEFT

## 2024-04-11 NOTE — PROGRESS NOTES
Physical Medicine & Rehabilitation  Progress Note    2024 11:29 AM     CC: Ambulatory and ADL dysfunction due to fall with left subtrochanteric femur fracture status post IM nailing and right femoral shaft stress fracture status post IM nailing    Ortho continue Lovenox weightbearing as tolerated bilateral lower extremity    Subjective:   No complaints.  Anemia this morning received transfusion    ROS:  Denies fevers, chills, sweats.  No chest pain, palpitations, lightheadedness.  Denies coughing, wheezing or shortness of breath.  Denies abdominal pain, nausea, diarrhea or constipation.  No new areas of joint pain.  Denies new areas of numbness or weakness.  Denies new anxiety or depression issues.  No new skin problems.    Rehabilitation:   PT:                               OT:             ST:        Objective:  /71   Pulse 97   Temp 98.3 °F (36.8 °C) (Oral)   Resp 16   Ht 1.727 m (5' 8\")   Wt 66.9 kg (147 lb 7.8 oz)   SpO2 97%   BMI 22.43 kg/m²  I Body mass index is 22.43 kg/m². I   Wt Readings from Last 1 Encounters:   24 66.9 kg (147 lb 7.8 oz)      Temp (24hrs), Av.2 °F (36.8 °C), Min:97.5 °F (36.4 °C), Max:98.7 °F (37.1 °C)         GEN: well developed, well nourished, no acute distress  HEENT: Normocephalic atraumatic, EOMI, mucous membranes pink and moist  CV: RRR, no murmurs, rubs or gallops  PULM: CTAB, no rales or rhonchi. Respirations WNL and unlabored  ABD: soft, NT, ND, +BS and equal  NEURO: A&O x3. Sensation intact to light touch.   MSK: 4+/5 upper extremities and distal lower extremities unable to straight leg raise  EXTREMITIES: No calf tenderness to palpation bilaterally. No edema BLEs  SKIN: warm dry and intact with good turgor  PSYCH: appropriately interactive. Affect WNL.          Medications   Scheduled Meds:   methocarbamol  750 mg Oral Q6H    enoxaparin  30 mg SubCUTAneous BID    fentaNYL  100 mcg IntraVENous Once    scopolamine  1 patch TransDERmal Once    insulin

## 2024-04-11 NOTE — PROGRESS NOTES
Orthopedic Progress Note    Patient:  Dariana Juan  YOB: 1955     68 y.o. female    Subjective:  Patient seen and examined this morning. No complaints or concerns. No issues overnight per nursing. Pain is well controlled on current regimen, only taking Tylenol. Denies fever, HA, CP, SOB, N/V, dysuria, new numbness/tingling.  Will be evaluated by physical therapy today.    Vitals reviewed, afebrile    Objective:   Vitals:    04/11/24 0356   BP: 115/73   Pulse: 96   Resp: 14   Temp: 98.3 °F (36.8 °C)   SpO2: 96%     Gen: NAD, cooperative    Cardiovascular: Regular rate   Respiratory: No acute respiratory distress, breathing comfortably on room air    Orthopedic Exam    BLE: Optifoam dressings on.  They are clean, dry and intact.  Patient is able to fully flex and extend all toes, plantarflex/dorsiflex the ankle without pain.  Patient has mild tenderness to palpation at the proximal hips bilaterally around the surgical site.  Compartments are soft and easily compressible.  No bony crepitus felt on palpation.  Sensation intact to light touch from L2-S1.  PT +2 with BCR.    Recent Labs     04/10/24  0353 04/10/24  1743   WBC 8.2  --    HGB 8.3* 7.4*   HCT 26.8* 23.2*   *  --      --    K 4.6  --    BUN 22  --    CREATININE 0.9  --    GLUCOSE 165*  --       Meds:   Abx: Postop Ancef every 8 hours  DVT ppx: Lovenox 30 mg twice daily  See rec for complete list    Impression 68 y.o. female who is being seen for:     - Left subtrochanteric femur fracture s/p IMN, DOS: 4/8/24   - Right femoral shaft stress fracture s/p IMN, DOS: 4/10/24     Plan  - No further plans for orthopedic intervention at this time  - Post-op Ancef q8h x2 doses  - Optifoam on BLE. Do not remove optifoam dressings.  Okay to reinforce/maintain by nursing if necessary. Please notify Ortho if saturated.  - WBAT  to the BLE  - Pain control and medical management per primary   - DVT ppx:   Lovenox    - Ice to control

## 2024-04-11 NOTE — CARE COORDINATION
Transitional Planning: Met w/ patient and still requesting [facility] as 1st choice and was seen by PM & R today. [facility] made a site visit today and patient is OK w/ [facility] as 2nd choice.

## 2024-04-11 NOTE — CONSENT
Informed Consent for Blood Component Transfusion Note    I have discussed with the patient the rationale for blood component transfusion; its benefits in treating or preventing fatigue, organ damage, or death; and its risk which includes mild transfusion reactions, rare risk of blood borne infection, or more serious but rare reactions. I have discussed the alternatives to transfusion, including the risk and consequences of not receiving transfusion. The patient had an opportunity to ask questions and had agreed to proceed with transfusion of blood components.    Electronically signed by Maryana Ennis DO on 4/11/24 at 7:25 AM EDT

## 2024-04-11 NOTE — PROGRESS NOTES
Physical Therapy  Facility/Department: [address] BURN UNIT  Physical Therapy Initial Assessment    Name: Dariana Juan  : 1955  MRN: [id number]  Date of Service: 2024    68 y.o. female who is being seen after fall with hip pain      - Left subtrochanteric femur fracture s/p IMN, DOS: 24   - Right femoral shaft stress fracture s/p IMN, DOS: 4/10/24     WBAT  to the BLE     Xray today due to back pain, baseline neuropathy to bilateral feet      Discharge Recommendations:  Therapy recommended at discharge   PT Equipment Recommendations  Equipment Needed: No  Other: TBD      Patient Diagnosis(es): The encounter diagnosis was Closed fracture of left femur, unspecified fracture morphology, unspecified portion of femur, initial encounter ([facility]).  Past Medical History:  has a past medical history of BRCA1 gene mutation positive, BRCA2 gene mutation positive, and Cancer ([facility]).  Past Surgical History:  has a past surgical history that includes Breast biopsy;  section; Hysterectomy; Thyroid surgery; Cholecystectomy; Colonoscopy; bladder suspension; EMG; Femur Sarah nail insertion (2024); Tibia fracture surgery (Left, 2024); and Femur Sarah nail insertion (Right, 04/10/2024).    Assessment   Body Structures, Functions, Activity Limitations Requiring Skilled Therapeutic Intervention: Decreased functional mobility ;Decreased tolerance to work activity;Decreased strength;Decreased endurance;Decreased balance;Increased pain  Assessment: Pt presents with bilateral LE's post surgical pain and debility. Pt currently unable to ambulate at this time due to report of feeling faint. RN notified with plan for transfusion. Pt sat EOB x ~8 min,  good sitting balance with use of bilateral UE support fading assist CGA to SBA with good sitting balancePt educated on safety and use of RW with focus on hand and foot placement, benefit and recommendation for usePt will continue to benefit from PT intervention  to  Activity limited due to low BP , defer further per RN due to low Hgb 6.9  Ambulation  Assistance: Unable to assess  Comments: Pt educated on safety and use of RW with focus on hand and foot placement, benefit and recommendation for use     Balance  Posture: Good  Sitting - Static: Good  Sitting - Dynamic: Good  Comments: balance assessed with bilateral UE support at EOB  Exercise Treatment: Pt provided verbal and practical instruction in bilateral LE  Isometric/ ROM  exercises to prevent functional strength decline and imporve mobilty completing 3-7 reps each.          AM-PAC Basic Mobility - Inpatient   How much help is needed turning from your back to your side while in a flat bed without using bedrails?: A Lot  How much help is needed moving from lying on your back to sitting on the side of a flat bed without using bedrails?: A Lot  How much help is needed moving to and from a bed to a chair?: Total  How much help is needed standing up from a chair using your arms?: Total  How much help is needed walking in hospital room?: Total  How much help is needed climbing 3-5 steps with a railing?: Total  AM-PAC Inpatient Mobility Raw Score : 8  AM-PAC Inpatient T-Scale Score : 28.52  Mobility Inpatient CMS 0-100% Score: 86.62  Mobility Inpatient CMS G-Code Modifier : CM         Goals  Short Term Goals  Time Frame for Short Term Goals: 14 visits  Short Term Goal 1: Pt to complete bed mobility with mod I and sit EOB without abnormal vitals needed to progress transfers  Short Term Goal 2: Pt to transfer from bed <> recliner  with Min A demonstrating safety with AD  Short Term Goal 3: Pt to ambulate 20 ft mod A with RW  Short Term Goal 4: Pt to ascend/descend 2 steps with min A and rigth railing  Short Term Goal 5: Pt to be provided verbal, written and practical instruction in bilateral LE ROM exercises to improve general strength and activity tolerance.  Patient Goals   Patient Goals : To return home       Education  Patient

## 2024-04-11 NOTE — PLAN OF CARE
Problem: Pain  Goal: Verbalizes/displays adequate comfort level or baseline comfort level  4/11/2024 1615 by Bebeto Richardson RN  Outcome: Progressing  4/11/2024 0421 by Ashely Townsend RN  Outcome: Progressing     Problem: Skin/Tissue Integrity  Goal: Absence of new skin breakdown  Description: 1.  Monitor for areas of redness and/or skin breakdown  2.  Assess vascular access sites hourly  3.  Every 4-6 hours minimum:  Change oxygen saturation probe site  4.  Every 4-6 hours:  If on nasal continuous positive airway pressure, respiratory therapy assess nares and determine need for appliance change or resting period.  4/11/2024 1615 by Bebteo Richardson RN  Outcome: Progressing  4/11/2024 0421 by Ashely Townsend RN  Outcome: Progressing     Problem: Safety - Adult  Goal: Free from fall injury  4/11/2024 1615 by Bebeto Richardson RN  Outcome: Progressing  4/11/2024 0421 by Ashely Townsend RN  Outcome: Progressing     Problem: ABCDS Injury Assessment  Goal: Absence of physical injury  4/11/2024 1615 by Bebeto Richardson RN  Outcome: Progressing  4/11/2024 0421 by Ashely Townsend RN  Outcome: Progressing     Problem: Discharge Planning  Goal: Discharge to home or other facility with appropriate resources  4/11/2024 1615 by Bebeto Richardson RN  Outcome: Progressing  4/11/2024 0421 by Ashely Townsend RN  Outcome: Progressing

## 2024-04-11 NOTE — PROGRESS NOTES
to enter without rails  Entrance Stairs - Number of Steps: 3 no railing , full flight  to basement  Bathroom Shower/Tub: Tub/Shower unit  Bathroom Toilet: Standard  Bathroom Accessibility: Not accessible  Home Equipment: Walker, standard, Walker, 4 wheeled  Has the patient had two or more falls in the past year or any fall with injury in the past year?: Yes  Receives Help From: Family  ADL Assistance: Independent  Homemaking Assistance: Independent (Shared w/ household)  Homemaking Responsibilities: Yes  Meal Prep Responsibility: Primary  Laundry Responsibility: Primary  Cleaning Responsibility: Primary  Ambulation Assistance: Independent  Transfer Assistance: Independent  Active : Yes  Mode of Transportation: Car  Occupation: Retired  Type of Occupation:   Leisure & Hobbies: garden and flowers playing with grand kids       Safety Devices  Type of Devices: Call light within reach;Gait belt;Nurse notified;Left in bed (Pt with RN end of session)  Restraints  Restraints Initially in Place: No    Bed Mobility Training  Bed Mobility Training: Yes  Overall Level of Assistance: Moderate assistance;Assist X2  Interventions: Safety awareness training;Visual cues (Educationon positioning for optimal safety/IND with fair carryover)  Supine to Sit: Moderate assistance;Assist X2 (trunk/ble progression)  Sit to Supine: Moderate assistance;Assist X2 (trunk/ble progression)  Scooting: Moderate assistance      Balance    Sitting: High guard (Pt SBA/CGA during seated activity edge of bed. Pt reports increase fatigue and feeling \"faint\", pt able to engage edge of bed ~5 minutes and supine w/ HOB elevated ~8 minutes during meaningful activity, 109/67 HR 86 SPO2 96% upon laying supine)      Standing:  (Secondary to low HgB and reports of feeling faint, writer deferring initiation of standing, RN notified and defers standing as well)         AROM: Generally decreased, functional (RUE WFL, L shoulder flexion ~0-100 w/  reports of arthritis, L elbow fx/ex WFL)  Strength: Generally decreased, functional (RUE Grossly 4-/5, L shoulder flexion 2+/5, L elbow fx/ex 3+/5)  Coordination: Within functional limits  Tone: Normal      ADL  Feeding: Modified independent ;Setup;Based on clinical judgement  Grooming: Modified independent ;Setup  Grooming Skilled Clinical Factors: Pt completes oral care and face hygiene  UE Bathing: Minimal assistance;Setup;Based on clinical judgement  LE Bathing: Moderate assistance;Setup;Based on clinical judgement  UE Dressing: Minimal assistance;Setup;Based on clinical judgement  LE Dressing: Maximum assistance;Setup;Based on clinical judgement  LE Dressing Skilled Clinical Factors: DEP for don B socks in preperation for OOB  Toileting: Maximum assistance;Setup;Based on clinical judgement  Additional Comments: Secondary to increase pain and limited abilities to stand this date, anticipated assist with all ADLs         Vision  Vision: Impaired  Vision Exceptions: Wears glasses at all times  Tracking: Able to track stimulus in all quads w/o difficulty  Hearing  Hearing: Within functional limits      Cognition  Overall Cognitive Status: Exceptions  Arousal/Alertness: Appropriate responses to stimuli  Problem Solving: Assistance required to generate solutions;Assistance required to implement solutions  Insights: Fully aware of deficits  Initiation: Requires cues for some  Sequencing: Requires cues for some  Cognition Comment: Pt is pleasent and cooperative, able to follow multistep commands consistantly and make needs known.  Orientation  Overall Orientation Status: Within Functional Limits  Orientation Level: Oriented X4                  Education Given To: Patient  Education Provided: Role of Therapy;Plan of Care;Precautions;Transfer Training;Fall Prevention Strategies  Education Provided Comments: Education on role of OT/POC, education safety and positioning with transfers, EC/breathing tech for optimal

## 2024-04-11 NOTE — PLAN OF CARE
Problem: Pain  Goal: Verbalizes/displays adequate comfort level or baseline comfort level  4/11/2024 0421 by Ashely Townsend RN  Outcome: Progressing  4/10/2024 1719 by Bebeto Richardson RN  Outcome: Progressing     Problem: Skin/Tissue Integrity  Goal: Absence of new skin breakdown  Description: 1.  Monitor for areas of redness and/or skin breakdown  2.  Assess vascular access sites hourly  3.  Every 4-6 hours minimum:  Change oxygen saturation probe site  4.  Every 4-6 hours:  If on nasal continuous positive airway pressure, respiratory therapy assess nares and determine need for appliance change or resting period.  4/11/2024 0421 by Ashely Townsend RN  Outcome: Progressing  4/10/2024 1719 by Bebeto Richardson RN  Outcome: Progressing     Problem: Safety - Adult  Goal: Free from fall injury  4/11/2024 0421 by Ashely Townsend RN  Outcome: Progressing  4/10/2024 1719 by Bebeto Richardson RN  Outcome: Progressing     Problem: ABCDS Injury Assessment  Goal: Absence of physical injury  4/11/2024 0421 by Ashely Townsend RN  Outcome: Progressing  4/10/2024 1719 by Bebeto Richardson RN  Outcome: Progressing     Problem: Discharge Planning  Goal: Discharge to home or other facility with appropriate resources  4/11/2024 0421 by Ashely Townsend RN  Outcome: Progressing  4/10/2024 1719 by Bebeto Richardson RN  Outcome: Progressing

## 2024-04-11 NOTE — CARE COORDINATION
Transitional planning: Met w/ patient about discharge plan. Patient had surgery today for repair of fx L hip.Has an ill  at home and needs to go somewhere for rehab herself prior to coming home. Discussed ARU vs SNF. Has decided that home care is a bad idea, because fears will not recover herself d/t needs  of  at home. 1st option is [facility] ARU and CM requested PM & R consult thru Pio Perez RN and will get order. [facility] is back up plan.

## 2024-04-11 NOTE — PROGRESS NOTES
PROGRESS NOTE          PATIENT NAME: Dariana Juan  MEDICAL RECORD NO. [id number]  DATE: 4/11/2024    HD: # 4      Patient Active Problem List   Diagnosis    Left displaced femoral neck fracture (HCC)    Closed fracture of left femur (HCC)    Stress fracture of right femur       DIAGNOSIS AND PLAN    Left femur fracture s/p l IMN 4/8/2024  R femur stress fracture s/p IMN 4/10  Anemia due to dilution and operative blood loss   Hg 6.9 today, 1 U PRBC, check am hg  Urinary retention    Remove romero today- void trial  Lumbar pain   Plain films L spine and sacrum  Lovenox bid  sliding scale insulin started- hg A1c 6.5  Carb control diet  MMPT  Pt/ot  Dispo planning      Chief Complaint: \"I feel ok\"    SUBJECTIVE    Patient seen and examined. Af, vitals within normal. Tachy to 107 overnight. Pain controlled. UO adequate.endorses lower back pain.      OBJECTIVE  VITALS:   Vitals:    04/11/24 1102   BP:    Pulse:    Resp: 16   Temp:    SpO2:      Physical Exam  Constitutional:       Appearance: She is normal weight.   HENT:      Head: Normocephalic and atraumatic.      Right Ear: External ear normal.      Left Ear: External ear normal.      Nose: Nose normal.      Mouth/Throat:      Mouth: Mucous membranes are moist.   Cardiovascular:      Rate and Rhythm: Normal rate and regular rhythm.      Pulses: Normal pulses.   Pulmonary:      Effort: Pulmonary effort is normal. No respiratory distress.   Abdominal:      General: There is no distension.      Palpations: Abdomen is soft.      Tenderness: There is no abdominal tenderness.   Musculoskeletal:      Cervical back: Normal range of motion and neck supple. No rigidity.      Comments: Incisions clean dry and in tact   Skin:     General: Skin is warm.      Capillary Refill: Capillary refill takes less than 2 seconds.      Coloration: Skin is not jaundiced.      Findings: No bruising.   Neurological:      General: No focal deficit present.      Mental Status: She is alert and

## 2024-04-12 LAB
ABO/RH: NORMAL
ANION GAP SERPL CALCULATED.3IONS-SCNC: 10 MMOL/L (ref 9–16)
ANTIBODY SCREEN: NEGATIVE
ARM BAND NUMBER: NORMAL
BASOPHILS # BLD: 0.04 K/UL (ref 0–0.2)
BASOPHILS NFR BLD: 0 % (ref 0–2)
BLOOD BANK BLOOD PRODUCT EXPIRATION DATE: NORMAL
BLOOD BANK DISPENSE STATUS: NORMAL
BLOOD BANK ISBT PRODUCT BLOOD TYPE: 5100
BLOOD BANK PRODUCT CODE: NORMAL
BLOOD BANK SAMPLE EXPIRATION: NORMAL
BLOOD BANK UNIT TYPE AND RH: NORMAL
BPU ID: NORMAL
BUN SERPL-MCNC: 19 MG/DL (ref 8–23)
CALCIUM SERPL-MCNC: 7.9 MG/DL (ref 8.6–10.4)
CHLORIDE SERPL-SCNC: 104 MMOL/L (ref 98–107)
CO2 SERPL-SCNC: 22 MMOL/L (ref 20–31)
COMPONENT: NORMAL
CREAT SERPL-MCNC: 0.7 MG/DL (ref 0.5–0.9)
CROSSMATCH RESULT: NORMAL
EOSINOPHIL # BLD: 0.28 K/UL (ref 0–0.44)
EOSINOPHILS RELATIVE PERCENT: 3 % (ref 1–4)
ERYTHROCYTE [DISTWIDTH] IN BLOOD BY AUTOMATED COUNT: 13.7 % (ref 11.8–14.4)
EST. AVERAGE GLUCOSE BLD GHB EST-MCNC: 131 MG/DL
GFR SERPL CREATININE-BSD FRML MDRD: >90 ML/MIN/1.73M2
GLUCOSE BLD-MCNC: 138 MG/DL (ref 65–105)
GLUCOSE BLD-MCNC: 164 MG/DL (ref 65–105)
GLUCOSE BLD-MCNC: 175 MG/DL (ref 65–105)
GLUCOSE BLD-MCNC: 208 MG/DL (ref 65–105)
GLUCOSE SERPL-MCNC: 153 MG/DL (ref 74–99)
HBA1C MFR BLD: 6.2 % (ref 4–6)
HCT VFR BLD AUTO: 24.8 % (ref 36.3–47.1)
HCT VFR BLD AUTO: 25.1 % (ref 36.3–47.1)
HGB BLD-MCNC: 8.2 G/DL (ref 11.9–15.1)
HGB BLD-MCNC: 8.2 G/DL (ref 11.9–15.1)
IMM GRANULOCYTES # BLD AUTO: 0.17 K/UL (ref 0–0.3)
IMM GRANULOCYTES NFR BLD: 2 %
LYMPHOCYTES NFR BLD: 2.24 K/UL (ref 1.1–3.7)
LYMPHOCYTES RELATIVE PERCENT: 22 % (ref 24–43)
MCH RBC QN AUTO: 30.4 PG (ref 25.2–33.5)
MCHC RBC AUTO-ENTMCNC: 32.7 G/DL (ref 28.4–34.8)
MCV RBC AUTO: 93 FL (ref 82.6–102.9)
MONOCYTES NFR BLD: 0.74 K/UL (ref 0.1–1.2)
MONOCYTES NFR BLD: 7 % (ref 3–12)
NEUTROPHILS NFR BLD: 66 % (ref 36–65)
NEUTS SEG NFR BLD: 6.83 K/UL (ref 1.5–8.1)
NRBC BLD-RTO: 0.2 PER 100 WBC
PLATELET # BLD AUTO: 130 K/UL (ref 138–453)
PMV BLD AUTO: 12.3 FL (ref 8.1–13.5)
POTASSIUM SERPL-SCNC: 4.4 MMOL/L (ref 3.7–5.3)
RBC # BLD AUTO: 2.7 M/UL (ref 3.95–5.11)
SODIUM SERPL-SCNC: 136 MMOL/L (ref 136–145)
TRANSFUSION STATUS: NORMAL
UNIT DIVISION: 0
UNIT ISSUE DATE/TIME: NORMAL
WBC OTHER # BLD: 10.3 K/UL (ref 3.5–11.3)

## 2024-04-12 PROCEDURE — 97530 THERAPEUTIC ACTIVITIES: CPT

## 2024-04-12 PROCEDURE — 85014 HEMATOCRIT: CPT

## 2024-04-12 PROCEDURE — 6360000002 HC RX W HCPCS: Performed by: STUDENT IN AN ORGANIZED HEALTH CARE EDUCATION/TRAINING PROGRAM

## 2024-04-12 PROCEDURE — 85018 HEMOGLOBIN: CPT

## 2024-04-12 PROCEDURE — 6370000000 HC RX 637 (ALT 250 FOR IP): Performed by: STUDENT IN AN ORGANIZED HEALTH CARE EDUCATION/TRAINING PROGRAM

## 2024-04-12 PROCEDURE — 6370000000 HC RX 637 (ALT 250 FOR IP)

## 2024-04-12 PROCEDURE — 85025 COMPLETE CBC W/AUTO DIFF WBC: CPT

## 2024-04-12 PROCEDURE — 97110 THERAPEUTIC EXERCISES: CPT

## 2024-04-12 PROCEDURE — 82947 ASSAY GLUCOSE BLOOD QUANT: CPT

## 2024-04-12 PROCEDURE — 80048 BASIC METABOLIC PNL TOTAL CA: CPT

## 2024-04-12 PROCEDURE — 99232 SBSQ HOSP IP/OBS MODERATE 35: CPT | Performed by: PHYSICAL MEDICINE & REHABILITATION

## 2024-04-12 PROCEDURE — 36415 COLL VENOUS BLD VENIPUNCTURE: CPT

## 2024-04-12 PROCEDURE — 83036 HEMOGLOBIN GLYCOSYLATED A1C: CPT

## 2024-04-12 PROCEDURE — 1200000000 HC SEMI PRIVATE

## 2024-04-12 PROCEDURE — 51798 US URINE CAPACITY MEASURE: CPT

## 2024-04-12 RX ORDER — GABAPENTIN 100 MG/1
100 CAPSULE ORAL EVERY 8 HOURS SCHEDULED
Status: CANCELLED | OUTPATIENT
Start: 2024-04-12

## 2024-04-12 RX ORDER — ACETAMINOPHEN 500 MG
1000 TABLET ORAL EVERY 8 HOURS SCHEDULED
Status: CANCELLED | OUTPATIENT
Start: 2024-04-12

## 2024-04-12 RX ORDER — BISACODYL 10 MG
10 SUPPOSITORY, RECTAL RECTAL DAILY PRN
Status: CANCELLED | OUTPATIENT
Start: 2024-04-12

## 2024-04-12 RX ORDER — TAMSULOSIN HYDROCHLORIDE 0.4 MG/1
0.4 CAPSULE ORAL DAILY
Status: DISCONTINUED | OUTPATIENT
Start: 2024-04-12 | End: 2024-04-13 | Stop reason: HOSPADM

## 2024-04-12 RX ORDER — SENNA AND DOCUSATE SODIUM 50; 8.6 MG/1; MG/1
2 TABLET, FILM COATED ORAL DAILY PRN
Status: CANCELLED | OUTPATIENT
Start: 2024-04-12

## 2024-04-12 RX ORDER — ENOXAPARIN SODIUM 100 MG/ML
30 INJECTION SUBCUTANEOUS 2 TIMES DAILY
Status: CANCELLED | OUTPATIENT
Start: 2024-04-12

## 2024-04-12 RX ORDER — METHOCARBAMOL 750 MG/1
750 TABLET, FILM COATED ORAL EVERY 6 HOURS
Status: CANCELLED | OUTPATIENT
Start: 2024-04-12

## 2024-04-12 RX ORDER — POLYETHYLENE GLYCOL 3350 17 G/17G
17 POWDER, FOR SOLUTION ORAL DAILY
Status: CANCELLED | OUTPATIENT
Start: 2024-04-13

## 2024-04-12 RX ORDER — ACETAMINOPHEN 325 MG/1
650 TABLET ORAL EVERY 4 HOURS PRN
Status: CANCELLED | OUTPATIENT
Start: 2024-04-12

## 2024-04-12 RX ORDER — OXYCODONE HCL 5 MG/5 ML
5 SOLUTION, ORAL ORAL EVERY 6 HOURS PRN
Status: CANCELLED | OUTPATIENT
Start: 2024-04-12

## 2024-04-12 RX ORDER — TAMSULOSIN HYDROCHLORIDE 0.4 MG/1
0.4 CAPSULE ORAL DAILY
Status: CANCELLED | OUTPATIENT
Start: 2024-04-13

## 2024-04-12 RX ORDER — POLYETHYLENE GLYCOL 3350 17 G/17G
17 POWDER, FOR SOLUTION ORAL DAILY
Status: CANCELLED | OUTPATIENT
Start: 2024-04-12

## 2024-04-12 RX ORDER — ATORVASTATIN CALCIUM 20 MG/1
10 TABLET, FILM COATED ORAL NIGHTLY
Status: CANCELLED | OUTPATIENT
Start: 2024-04-12

## 2024-04-12 RX ORDER — LEVOTHYROXINE SODIUM 0.1 MG/1
100 TABLET ORAL DAILY
Status: CANCELLED | OUTPATIENT
Start: 2024-04-13

## 2024-04-12 RX ORDER — INSULIN LISPRO 100 [IU]/ML
0-4 INJECTION, SOLUTION INTRAVENOUS; SUBCUTANEOUS
Status: CANCELLED | OUTPATIENT
Start: 2024-04-12

## 2024-04-12 RX ORDER — SENNOSIDES A AND B 8.6 MG/1
2 TABLET, FILM COATED ORAL DAILY PRN
Status: CANCELLED | OUTPATIENT
Start: 2024-04-12

## 2024-04-12 RX ADMIN — ATORVASTATIN CALCIUM 10 MG: 20 TABLET, FILM COATED ORAL at 22:27

## 2024-04-12 RX ADMIN — TAMSULOSIN HYDROCHLORIDE 0.4 MG: 0.4 CAPSULE ORAL at 08:07

## 2024-04-12 RX ADMIN — OXYCODONE HYDROCHLORIDE 5 MG: 5 SOLUTION ORAL at 10:24

## 2024-04-12 RX ADMIN — GABAPENTIN 100 MG: 100 CAPSULE ORAL at 14:25

## 2024-04-12 RX ADMIN — ACETAMINOPHEN 1000 MG: 500 TABLET ORAL at 05:13

## 2024-04-12 RX ADMIN — GABAPENTIN 100 MG: 100 CAPSULE ORAL at 22:26

## 2024-04-12 RX ADMIN — ACETAMINOPHEN 1000 MG: 500 TABLET ORAL at 14:25

## 2024-04-12 RX ADMIN — ENOXAPARIN SODIUM 30 MG: 100 INJECTION SUBCUTANEOUS at 09:53

## 2024-04-12 RX ADMIN — GABAPENTIN 100 MG: 100 CAPSULE ORAL at 05:13

## 2024-04-12 RX ADMIN — ENOXAPARIN SODIUM 30 MG: 100 INJECTION SUBCUTANEOUS at 22:26

## 2024-04-12 RX ADMIN — ACETAMINOPHEN 1000 MG: 500 TABLET ORAL at 22:27

## 2024-04-12 RX ADMIN — METHOCARBAMOL 750 MG: 750 TABLET ORAL at 13:43

## 2024-04-12 RX ADMIN — METHOCARBAMOL 750 MG: 750 TABLET ORAL at 19:36

## 2024-04-12 RX ADMIN — LEVOTHYROXINE SODIUM 100 MCG: 100 TABLET ORAL at 05:13

## 2024-04-12 RX ADMIN — METHOCARBAMOL 750 MG: 750 TABLET ORAL at 07:58

## 2024-04-12 ASSESSMENT — PAIN DESCRIPTION - ORIENTATION
ORIENTATION: RIGHT;LEFT

## 2024-04-12 ASSESSMENT — PAIN SCALES - GENERAL
PAINLEVEL_OUTOF10: 2
PAINLEVEL_OUTOF10: 1
PAINLEVEL_OUTOF10: 7
PAINLEVEL_OUTOF10: 1
PAINLEVEL_OUTOF10: 1
PAINLEVEL_OUTOF10: 0
PAINLEVEL_OUTOF10: 0

## 2024-04-12 ASSESSMENT — PAIN DESCRIPTION - LOCATION
LOCATION: HIP
LOCATION: HIP;LEG
LOCATION: HIP;LEG

## 2024-04-12 ASSESSMENT — PAIN DESCRIPTION - DESCRIPTORS
DESCRIPTORS: STABBING;ACHING
DESCRIPTORS: DISCOMFORT
DESCRIPTORS: SORE
DESCRIPTORS: SORE

## 2024-04-12 ASSESSMENT — PAIN - FUNCTIONAL ASSESSMENT
PAIN_FUNCTIONAL_ASSESSMENT: PREVENTS OR INTERFERES SOME ACTIVE ACTIVITIES AND ADLS

## 2024-04-12 NOTE — PLAN OF CARE
Problem: Pain  Goal: Verbalizes/displays adequate comfort level or baseline comfort level  4/12/2024 1632 by Bebeto Richardson RN  Outcome: Progressing  4/12/2024 0526 by Ashely Townsend RN  Outcome: Progressing     Problem: Skin/Tissue Integrity  Goal: Absence of new skin breakdown  Description: 1.  Monitor for areas of redness and/or skin breakdown  2.  Assess vascular access sites hourly  3.  Every 4-6 hours minimum:  Change oxygen saturation probe site  4.  Every 4-6 hours:  If on nasal continuous positive airway pressure, respiratory therapy assess nares and determine need for appliance change or resting period.  4/12/2024 1632 by Bebeto Richardson RN  Outcome: Progressing  4/12/2024 0526 by Ashely Townsend RN  Outcome: Progressing     Problem: Safety - Adult  Goal: Free from fall injury  4/12/2024 1632 by Bebeto Richardson RN  Outcome: Progressing  4/12/2024 0526 by Ashely Townsend RN  Outcome: Progressing     Problem: ABCDS Injury Assessment  Goal: Absence of physical injury  4/12/2024 1632 by Bebeto Richardson RN  Outcome: Progressing  4/12/2024 0526 by Ashely Townsend RN  Outcome: Progressing     Problem: Discharge Planning  Goal: Discharge to home or other facility with appropriate resources  4/12/2024 0526 by Ashely Townsend RN  Outcome: Progressing

## 2024-04-12 NOTE — CARE COORDINATION
ACUTE INPATIENT REHABILITATION  [facility]  PRE-ADMISSION ASSESSMENT    Patient Name: Dariana Juan        MRN: [id number]    : 1955  (68 y.o.)  Gender: female   Ethnicity: Not , /a or Latvian Origin  Race: White    Admitted from:  [facility]     Type of Admission:  New Admission     Date of Onset / Admission to the Acute Hospital:  2024    Inpatient Rehabilitation Admitting Diagnosis:  Left subtrochanteric femur fracture and right femoral stress fracture status post IM nailing-felt to be related biphosphonate     Did patient have surgery/procedures?  Yes, As Listed Below     4/10/2024: PROPHYLACTIC INTRAMEDULLARY NAIL INSERTION FEMUR     Physicians:     NOEMY Denise MD  Acute Care Surgery    Carlos Solis MD  Physician  General Surgery    Patricio Caruso DO  Physician  Orthopedic Surgery    Risk for Clinical Complications:  Low     Co-morbidities:    Fall with left subtrochanteric femur fracture status post IM nailing 2024-weightbearing as tolerated  Right femoral shaft stress fracture status post IM nailing-4/10-weightbearing as tolerated  Osteoporosis on biphosphonate's-fracture felt to be biphosphonate related  ARLYN creatinine 0.7  Anemia blood loss hemoglobin 6.9 postop-improved 8.2 status post transfusion  Pain-Roxicodone, Neurontin, Robaxin-patient notes using mostly Tylenol and Roxicodone prior to therapy  Hyperlipidemia-Lipitor, noted lisinopril on hold  Hypothyroid-Synthroid  Urinary retention-started Flomax, required intermittent cath x 2    Financial Information  Primary insurance: Medicare     Secondary Insurance: Commercial Insurance:Medical [place]      Precautions:   []Cardiac Precautions: No Cardiac Precautions  []Total hip precautions: No Total Hip Precautions  []Weight Bearing status: No Weight Bearing Restrictions  [x]Safety Precautions/Concerns:  Fall Risk, General Precautions  [x]Visually impaired: Yes:  to Stand: Maximum Assistance  Stand to Sit: Unable to assess  Bed to Chair: Unable to assess  Stand Pivot Transfers: Unable to assess  Comment: pt attempted to stand with maxA, completed ~50% of transfer. Not attempted again secondary to low BP 80/53. RN aware and messaging trauma while pt was seated EOB    Current functional status for toilet transfers:   Toileting: Maximum assistance;Setup;Based on clinical judgement     Current functional status for locomotion:  Ambulation  Assistance: Unable to assess  Comments: unsafe to attempt, pt unable to stand fully upright    Current functional status for comprehension: Within Functional Limits    Current functional status for expression: Within Functional Limits    Current functional status for social interaction: Within Functional Limits    Current functional status for problem solving: Exceptions To Functional Limits    Current functional status for memory: Within Functional Limits    Current Deficits R/T Impairment: Impaired Functional Mobility and Decreased ADLs    Required Therapy Services:  Physical Therapy: Yes  Occupational Therapy: Yes  Speech Therapy: May benefit from speech therapy services, patient to be screened upon admission      Additional Services:    [x] /Case Management    [] Recreational Therapy, as appropriate    [x] Nutrition Consult, as appropriate  [x] Dietary Needs/Preferences: Specialized Dietary Needs/Preferences indicated as follows: ADULT DIET; Regular; 4 carb choices (60 gm/meal)  [] Dialysis  [x] Cultural/Yarsani Needs/Preferences: Cultural/Yarsani Needs/Preferences indicated as follows: Sikhism  [] Special Equipment Needs  [] Special Medication Needs  [] Other information relevant to patient's care needs:    Preferred Language: English    Does the patient need or want an  to communicate with a doctor or health care staff? No    Rehab Justification:  Needs 3 hrs therapy per day or 15 hours per week:

## 2024-04-12 NOTE — CARE COORDINATION
ACUTE INPATIENT REHABILITATION  Financial Disclosure Statement: Eligibility and Benefit Verification    Patient Name: Draiana Juan MRN: [id number]     Disclosure Statement  [facility] Acute Inpatient Rehabilitation at [facility] provides 24 hour individualized service to patients with functional limitations due to, but not limited to stroke, brain injury, spinal cord injury, major multiple trauma, hip fracture, amputation, and neurological disorders.  Acute Inpatient Rehabilitation provides rehabilitative nursing, physician coverage, as well as physical therapy, occupational therapy, speech therapy, recreational therapy and other services as deemed necessary by our Board Certified Physical Medicine and Rehabilitation Physicians, Dr. Aaron Rodrigez and Dr. Ester Hobbs and Dr. Kitty Shelley.    [facility] Acute Inpatient Rehabilitation at [facility] is fully accredited by the Commission on Accreditation of Rehabilitation Facilities (CARF) and The Joint Commission (TJC).    At a minimum, you will receive 5 days of therapy services totaling at least 15 hours per week. Your treatment program will consist of physical therapy at least 7.5 hours per week; occupational therapy 7.5 hours per week; and speech therapy 1.5 hours per week, as applicable.    Your estimated length of stay is currently:  Approximately 2 Weeks  Please Note: Estimated length of stay is based on individual condition and Acute Inpatient Rehabilitation specific needs. Length of stay may vary based upon interdisciplinary team assessment, insurance approval, and patient progression.    Your insurance coverage has been verified as follows:   Primary Insurance: Medicare   Deductible: $ 1632                       Coverage:  Per Medicare Benefit Period  Days 1-60:  $0 Co-Insurance  Days 61-90:  $400/day Co-Insurance  Days 91 and beyond:  $800/day Co-Insurance      Secondary Insurance: Medical [place]  Secondary insurance policies  often cover co-payments amounts.  Please contact your insurance company to verify benefits.    [facility]: [phone] for all billing questions.    Thank You for choosing [facility] Acute Inpatient Rehabilitation at [facility].                   Key Insurance Term Definitions   Deductible Amount you pay before insurance starts paying for your covered services.   Co-Pay Flat fee for a service determined by your plan.   Co-Insurance Partial out of pocket amount you owe once your deductible is fully met.   Maximum Out of Pocket The most you have to pay for covered services in a benefit period. After you spend this amount on deductibles, copayments, and coinsurance for in-network care and services, your health plan pays 100% of the costs of covered benefits.                   Revised 01/04/2024

## 2024-04-12 NOTE — PROGRESS NOTES
PROGRESS NOTE          PATIENT NAME: Dariana Juan  MEDICAL RECORD NO. [id number]  DATE: 4/12/2024    HD: # 5      Patient Active Problem List   Diagnosis    Left displaced femoral neck fracture (HCC)    Closed fracture of left femur (HCC)    Stress fracture of right femur       DIAGNOSIS AND PLAN    Left femur fracture s/p l IMN 4/8/2024  R femur stress fracture s/p IMN 4/10  Anemia due to dilution and operative blood loss   Hg 6.9 ->, 1 U PRBC-> 8.2  Urinary retention    Flomax added, requiring ISC  Lumbar pain   Plain films L spine and sacrum - negative      Afebrile vital signs stable on room air  Hemoglobin is 8 2 from 6 9 after unit of blood yesterday  had dizziness so didn't participate in PT OT  Taking p.o. requiring intermittent straight cath- feels better this am, will continue ISC  On home medications plus DVT prophylaxis insulin coverage Multimodal pain therapy stool softeners and Flomax was added took 1 narcotic in the past 4 days  She is requesting [facility] discharge  No plan for further orthopedic interventions Optifoam bilateral lower extremities do not remove dressings weight-bear as tolerated bilateral lower extremities follow-up with orthopedics on 425        OBJECTIVE  VITALS:   Vitals:    04/12/24 0706   BP: 114/69   Pulse: 93   Resp: 18   Temp: 98.1 °F (36.7 °C)   SpO2: 97%     Physical Exam  Constitutional:       Appearance: She is normal weight.   HENT:      Head: Normocephalic and atraumatic.      Right Ear: External ear normal.      Left Ear: External ear normal.      Nose: Nose normal.      Mouth/Throat:      Mouth: Mucous membranes are moist.   Cardiovascular:      Rate and Rhythm: Normal rate and regular rhythm.      Pulses: Normal pulses.   Pulmonary:      Effort: Pulmonary effort is normal. No respiratory distress.   Abdominal:      General: There is no distension.      Palpations: Abdomen is soft.      Tenderness: There is no abdominal tenderness.   Musculoskeletal:       Cervical back: Normal range of motion and neck supple. No rigidity.      Comments: Incisions clean dry and in tact   Skin:     General: Skin is warm.      Capillary Refill: Capillary refill takes less than 2 seconds.      Coloration: Skin is not jaundiced.      Findings: No bruising.   Neurological:      General: No focal deficit present.      Mental Status: She is alert and oriented to person, place, and time.      Cranial Nerves: No cranial nerve deficit.   Psychiatric:         Mood and Affect: Mood normal.         Behavior: Behavior normal.           LAB:  CBC:   Recent Labs     04/10/24  0353 04/10/24  1743 04/11/24  0456 04/12/24 0315   WBC 8.2  --  8.4 10.3   HGB 8.3* 7.4* 6.9* 8.2*   HCT 26.8* 23.2* 21.6* 25.1*   MCV 92.7  --  92.7 93.0   *  --  118* 130*       BMP:   Recent Labs     04/10/24  0353 04/11/24  0456 04/12/24 0315    139 136   K 4.6 4.7 4.4    106 104   CO2 25 23 22   BUN 22 20 19   CREATININE 0.9 0.8 0.7   GLUCOSE 165* 214* 153*

## 2024-04-12 NOTE — PROGRESS NOTES
Physical Therapy  Facility/Department: [address] BURN UNIT  Physical Therapy Treatment Note    Name: Dariana Juan  : 1955  MRN: [id number]  Date of Service: 2024    Discharge Recommendations:  Further therapy recommended at discharge.The patient should be able to tolerate at least 3 hours of therapy per day over 5 days or 15 hours over 7 days.   This patient may benefit from a Physical Medicine and Rehab consult.    PT Equipment Recommendations  Other: likely will require RW, possibly WC      Patient Diagnosis(es): The encounter diagnosis was Closed fracture of left femur, unspecified fracture morphology, unspecified portion of femur, initial encounter ([facility]).  Past Medical History:  has a past medical history of BRCA1 gene mutation positive, BRCA2 gene mutation positive, and Cancer ([facility]).  Past Surgical History:  has a past surgical history that includes Breast biopsy;  section; Hysterectomy; Thyroid surgery; Cholecystectomy; Colonoscopy; bladder suspension; EMG; Femur Sarah nail insertion (2024); Tibia fracture surgery (Left, 2024); Femur Sarah nail insertion (Right, 04/10/2024); and Tibia fracture surgery (Right, 4/10/2024).    Assessment   Body Structures, Functions, Activity Limitations Requiring Skilled Therapeutic Intervention: Decreased functional mobility ;Decreased tolerance to work activity;Decreased strength;Decreased endurance;Decreased balance;Increased pain  Assessment: Pt required maxA for bed mobility and maxA to attempt STS transfer. Pt unable to stand fully upright with maxA, not attempted again d/t low BP 80/54 and pt feeling \"clammy\". Pt demonstrated good effort with PT and reports strong family support, would benefit from intensive PT to regain functional independence. Currently unsafe to return to prior living arragement at this time.  Therapy Prognosis: Good  Requires PT Follow-Up: Yes  Activity Tolerance  Activity Tolerance: Patient tolerated treatment    Cognition   Orientation  Overall Orientation Status: Within Functional Limits  Cognition  Overall Cognitive Status: WFL     Objective   SpO2: 96 %  O2 Device: None (Room air)     Observation/Palpation  Posture: Good      Bed mobility  Supine to Sit: Maximum assistance (therapist assisting with progression of B LEs to EOB then HHA to progress trunk)  Sit to Supine: Moderate assistance;2 Person assistance  Scooting: Moderate assistance  Bed Mobility Comments: increased time required for pain control, pt demonstrated excellent effort with therapy  Transfers  Sit to Stand: Maximum Assistance  Comment: pt attempted to stand with maxA, completed ~50% of transfer. Not attempted again secondary to low BP 80/53. RN aware and messaging trauma service while pt was seated EOB  Ambulation  Comments: unsafe to attempt, pt unable to stand fully upright     Balance  Posture: Good  Sitting - Static: Good (pt remained seated EOB ~10 mins with close SBA, B UEs supported on RW)  Sitting - Dynamic: Good  Standing - Static: Poor  Comments: seated balance assessed EOB, standing balance assessed with RW  Exercise Treatment: quad sets, hamstring sets, glut sets, ankle pumps x10 reps supine      AM-PAC - Mobility    AM-PAC Basic Mobility - Inpatient   How much help is needed turning from your back to your side while in a flat bed without using bedrails?: A Lot  How much help is needed moving from lying on your back to sitting on the side of a flat bed without using bedrails?: A Lot  How much help is needed moving to and from a bed to a chair?: Total  How much help is needed standing up from a chair using your arms?: Total  How much help is needed walking in hospital room?: Total  How much help is needed climbing 3-5 steps with a railing?: Total  AM-PAC Inpatient Mobility Raw Score : 8  AM-PAC Inpatient T-Scale Score : 28.52  Mobility Inpatient CMS 0-100% Score: 86.62  Mobility Inpatient CMS G-Code Modifier : CM    Goals  Short Term

## 2024-04-12 NOTE — PLAN OF CARE
Problem: Pain  Goal: Verbalizes/displays adequate comfort level or baseline comfort level  4/12/2024 0526 by Ashely Townsend RN  Outcome: Progressing  4/11/2024 1615 by Bebeto Richardson RN  Outcome: Progressing     Problem: Skin/Tissue Integrity  Goal: Absence of new skin breakdown  Description: 1.  Monitor for areas of redness and/or skin breakdown  2.  Assess vascular access sites hourly  3.  Every 4-6 hours minimum:  Change oxygen saturation probe site  4.  Every 4-6 hours:  If on nasal continuous positive airway pressure, respiratory therapy assess nares and determine need for appliance change or resting period.  4/12/2024 0526 by Ashely Townsend RN  Outcome: Progressing  4/11/2024 1615 by Bebeto Richardson RN  Outcome: Progressing     Problem: Safety - Adult  Goal: Free from fall injury  4/12/2024 0526 by Ashely Townsend RN  Outcome: Progressing  4/11/2024 1615 by Bebeto Richardson RN  Outcome: Progressing     Problem: ABCDS Injury Assessment  Goal: Absence of physical injury  4/12/2024 0526 by Ashely Townsend RN  Outcome: Progressing  4/11/2024 1615 by Bebeto Richardson RN  Outcome: Progressing     Problem: Discharge Planning  Goal: Discharge to home or other facility with appropriate resources  4/12/2024 0526 by Ashely Townsend RN  Outcome: Progressing  4/11/2024 1615 by Bebeto Richardson RN  Outcome: Progressing

## 2024-04-12 NOTE — PROGRESS NOTES
Physical Medicine & Rehabilitation  Progress Note    4/12/2024 12:57 PM     CC: Ambulatory and ADL dysfunction due to fall with left subtrochanteric femur fracture status post IM nailing and right femoral shaft stress fracture status post IM nailing    Ortho continue Lovenox weightbearing as tolerated bilateral lower extremity    Subjective:   No complaints.  Feels well.  Tried therapy today-became bit lightheaded.  Require intermittent caths yesterday, started on Flomax    ROS:  Denies fevers, chills, sweats.  No chest pain, palpitations, lightheadedness.  Denies coughing, wheezing or shortness of breath.  Denies abdominal pain, nausea, diarrhea or constipation.  No new areas of joint pain.  Denies new areas of numbness or weakness.  Denies new anxiety or depression issues.  No new skin problems.    Rehabilitation:   PT:    Bed mobility  Supine to Sit: Maximum assistance (therapist assisting with progression of B LEs to EOB then HHA to progress trunk)  Sit to Supine: Moderate assistance, 2 Person assistance  Scooting: Moderate assistance  Bed Mobility Comments: increased time required for pain control, pt demonstrated excellent effort with therapy    Transfers  Sit to Stand: Maximum Assistance  Stand to Sit: Unable to assess  Bed to Chair: Unable to assess  Stand Pivot Transfers: Unable to assess  Comment: pt attempted to stand with maxA, completed ~50% of transfer. Not attempted again secondary to low BP 80/53. RN aware and messaging trauma while pt was seated EOB    Ambulation  Assistance: Unable to assess  Comments: unsafe to attempt, pt unable to stand fully upright                OT:       ADL  Feeding: Modified independent ;Setup;Based on clinical judgement  Grooming: Modified independent ;Setup  Grooming Skilled Clinical Factors: Pt completes oral care and face hygiene  UE Bathing: Minimal assistance;Setup;Based on clinical judgement  LE Bathing: Moderate assistance;Setup;Based on clinical judgement  UE  6.9 postop-improved 8.2 status post transfusion  Pain-Roxicodone, Neurontin, Robaxin-patient notes using mostly Tylenol and Roxicodone prior to therapy  Hyperlipidemia-Lipitor, noted lisinopril on hold  Hypothyroid-Synthroid  Urinary retention-started Flomax, required intermittent cath x 2     Recommendations:  1. Diagnosis: Left subtrochanteric femur fracture and right femoral stress fracture status post IM nailing-felt to be related biphosphonate  2. Therapy: Has PT and OT need  3. Medical  Necessity: As above  4. Support: Clarify lives with  however has cancer  5. Rehab recommendation    In my opinion the patient will require acute inpatient rehabilitation and meets criteria for IRF level care once medically stable per primary and consulting services. Anticipate he/she will be able to tolerate 3 hours of therapy per day or 900 minutes per week in rehabilitation. The patient requires multidisciplinary rehabilitation treatment including medical management by a PM&R physician, 24 hour rehabilitation nursing, Physical/Occupational therapy, +/- speech therapy, rehabilitation social work, and nutrition services. Patient and family also require education in post-hospital precautions and home exercise routine, adaptive techniques and deficit compensation strategies, strengthening and conditioning, equipment prescription and instructions in use. Provision of services in a less intensive environment risks significant complications and more limited functional outcomes.     6. DVT proph: Subcu heparin     It was my pleasure to evaluate Dariana Juan today.  Please call with questions.           Aaron Aguilera MD       This note is created with the assistance of a speech recognition program.  While intending to generate a document that actually reflects the content of the visit, the document can still have some errors including those of syntax and sound a like substitutions which may escape proof reading.  In such

## 2024-04-12 NOTE — CARE COORDINATION
Transition planning  Spoke with Dionne at [facility], patient is stable for discharge as her repeat hemoglobin was 8.2. She asks that transport be set up for 8:00 pm.    Called and spoke with Angelique at [facility], she states they cannot transport until 10:00 pm tonight. Next transport time is tomorrow at 2:00 pm. Updated pt's RN and Mindi at [facility].    Report # [id number]

## 2024-04-13 ENCOUNTER — HOSPITAL ENCOUNTER (INPATIENT)
Age: 69
LOS: 12 days | Discharge: HOME HEALTH | End: 2024-04-25
Attending: PHYSICAL MEDICINE & REHABILITATION | Admitting: PHYSICAL MEDICINE & REHABILITATION
Payer: MEDICARE

## 2024-04-13 VITALS
OXYGEN SATURATION: 100 % | HEART RATE: 98 BPM | WEIGHT: 147.05 LBS | DIASTOLIC BLOOD PRESSURE: 78 MMHG | SYSTOLIC BLOOD PRESSURE: 130 MMHG | TEMPERATURE: 98.1 F | RESPIRATION RATE: 17 BRPM | HEIGHT: 68 IN | BODY MASS INDEX: 22.29 KG/M2

## 2024-04-13 DIAGNOSIS — M84.351A STRESS FRACTURE OF RIGHT FEMUR, INITIAL ENCOUNTER: ICD-10-CM

## 2024-04-13 DIAGNOSIS — S72.002A LEFT DISPLACED FEMORAL NECK FRACTURE (HCC): ICD-10-CM

## 2024-04-13 DIAGNOSIS — S72.002S CLOSED LEFT HIP FRACTURE, SEQUELA: Primary | ICD-10-CM

## 2024-04-13 LAB
ANION GAP SERPL CALCULATED.3IONS-SCNC: 10 MMOL/L (ref 9–16)
BASOPHILS # BLD: 0.04 K/UL (ref 0–0.2)
BASOPHILS NFR BLD: 0 % (ref 0–2)
BUN SERPL-MCNC: 23 MG/DL (ref 8–23)
CALCIUM SERPL-MCNC: 8.4 MG/DL (ref 8.6–10.4)
CHLORIDE SERPL-SCNC: 102 MMOL/L (ref 98–107)
CO2 SERPL-SCNC: 23 MMOL/L (ref 20–31)
CREAT SERPL-MCNC: 0.7 MG/DL (ref 0.5–0.9)
EOSINOPHIL # BLD: 0.38 K/UL (ref 0–0.44)
EOSINOPHILS RELATIVE PERCENT: 4 % (ref 1–4)
ERYTHROCYTE [DISTWIDTH] IN BLOOD BY AUTOMATED COUNT: 14.1 % (ref 11.8–14.4)
GFR SERPL CREATININE-BSD FRML MDRD: >90 ML/MIN/1.73M2
GLUCOSE BLD-MCNC: 169 MG/DL (ref 65–105)
GLUCOSE BLD-MCNC: 191 MG/DL (ref 65–105)
GLUCOSE SERPL-MCNC: 161 MG/DL (ref 74–99)
HCT VFR BLD AUTO: 25.7 % (ref 36.3–47.1)
HGB BLD-MCNC: 8.3 G/DL (ref 11.9–15.1)
IMM GRANULOCYTES # BLD AUTO: 0.19 K/UL (ref 0–0.3)
IMM GRANULOCYTES NFR BLD: 2 %
LYMPHOCYTES NFR BLD: 1.83 K/UL (ref 1.1–3.7)
LYMPHOCYTES RELATIVE PERCENT: 17 % (ref 24–43)
MCH RBC QN AUTO: 30.4 PG (ref 25.2–33.5)
MCHC RBC AUTO-ENTMCNC: 32.3 G/DL (ref 28.4–34.8)
MCV RBC AUTO: 94.1 FL (ref 82.6–102.9)
MONOCYTES NFR BLD: 0.85 K/UL (ref 0.1–1.2)
MONOCYTES NFR BLD: 8 % (ref 3–12)
NEUTROPHILS NFR BLD: 69 % (ref 36–65)
NEUTS SEG NFR BLD: 7.56 K/UL (ref 1.5–8.1)
NRBC BLD-RTO: 0 PER 100 WBC
PLATELET # BLD AUTO: 152 K/UL (ref 138–453)
PMV BLD AUTO: 12.4 FL (ref 8.1–13.5)
POTASSIUM SERPL-SCNC: 4.2 MMOL/L (ref 3.7–5.3)
RBC # BLD AUTO: 2.73 M/UL (ref 3.95–5.11)
SODIUM SERPL-SCNC: 135 MMOL/L (ref 136–145)
WBC OTHER # BLD: 10.9 K/UL (ref 3.5–11.3)

## 2024-04-13 PROCEDURE — 6360000002 HC RX W HCPCS: Performed by: NURSE PRACTITIONER

## 2024-04-13 PROCEDURE — 82947 ASSAY GLUCOSE BLOOD QUANT: CPT

## 2024-04-13 PROCEDURE — 1180000000 HC REHAB R&B

## 2024-04-13 PROCEDURE — 6370000000 HC RX 637 (ALT 250 FOR IP): Performed by: NURSE PRACTITIONER

## 2024-04-13 PROCEDURE — 36415 COLL VENOUS BLD VENIPUNCTURE: CPT

## 2024-04-13 PROCEDURE — 99232 SBSQ HOSP IP/OBS MODERATE 35: CPT | Performed by: SURGERY

## 2024-04-13 PROCEDURE — 6370000000 HC RX 637 (ALT 250 FOR IP)

## 2024-04-13 PROCEDURE — 6360000002 HC RX W HCPCS: Performed by: STUDENT IN AN ORGANIZED HEALTH CARE EDUCATION/TRAINING PROGRAM

## 2024-04-13 PROCEDURE — 6370000000 HC RX 637 (ALT 250 FOR IP): Performed by: STUDENT IN AN ORGANIZED HEALTH CARE EDUCATION/TRAINING PROGRAM

## 2024-04-13 PROCEDURE — 80048 BASIC METABOLIC PNL TOTAL CA: CPT

## 2024-04-13 PROCEDURE — 85025 COMPLETE CBC W/AUTO DIFF WBC: CPT

## 2024-04-13 PROCEDURE — 2580000003 HC RX 258: Performed by: STUDENT IN AN ORGANIZED HEALTH CARE EDUCATION/TRAINING PROGRAM

## 2024-04-13 RX ORDER — ACETAMINOPHEN 500 MG
1000 TABLET ORAL EVERY 8 HOURS SCHEDULED
Status: DISCONTINUED | OUTPATIENT
Start: 2024-04-13 | End: 2024-04-19

## 2024-04-13 RX ORDER — OXYCODONE HCL 5 MG/5 ML
5 SOLUTION, ORAL ORAL EVERY 6 HOURS PRN
Status: DISCONTINUED | OUTPATIENT
Start: 2024-04-13 | End: 2024-04-14

## 2024-04-13 RX ORDER — SENNOSIDES A AND B 8.6 MG/1
2 TABLET, FILM COATED ORAL DAILY PRN
Status: DISCONTINUED | OUTPATIENT
Start: 2024-04-13 | End: 2024-04-25 | Stop reason: HOSPADM

## 2024-04-13 RX ORDER — POLYETHYLENE GLYCOL 3350 17 G/17G
17 POWDER, FOR SOLUTION ORAL DAILY
Status: DISCONTINUED | OUTPATIENT
Start: 2024-04-13 | End: 2024-04-13 | Stop reason: SDUPTHER

## 2024-04-13 RX ORDER — BISACODYL 10 MG
10 SUPPOSITORY, RECTAL RECTAL DAILY PRN
Status: DISCONTINUED | OUTPATIENT
Start: 2024-04-13 | End: 2024-04-25 | Stop reason: HOSPADM

## 2024-04-13 RX ORDER — TAMSULOSIN HYDROCHLORIDE 0.4 MG/1
0.4 CAPSULE ORAL DAILY
Status: DISCONTINUED | OUTPATIENT
Start: 2024-04-14 | End: 2024-04-25 | Stop reason: HOSPADM

## 2024-04-13 RX ORDER — ATORVASTATIN CALCIUM 10 MG/1
10 TABLET, FILM COATED ORAL NIGHTLY
Status: DISCONTINUED | OUTPATIENT
Start: 2024-04-13 | End: 2024-04-25 | Stop reason: HOSPADM

## 2024-04-13 RX ORDER — LEVOTHYROXINE SODIUM 0.1 MG/1
100 TABLET ORAL DAILY
Status: DISCONTINUED | OUTPATIENT
Start: 2024-04-14 | End: 2024-04-25 | Stop reason: HOSPADM

## 2024-04-13 RX ORDER — METHOCARBAMOL 750 MG/1
750 TABLET, FILM COATED ORAL EVERY 6 HOURS
Status: DISCONTINUED | OUTPATIENT
Start: 2024-04-13 | End: 2024-04-14

## 2024-04-13 RX ORDER — POLYETHYLENE GLYCOL 3350 17 G/17G
17 POWDER, FOR SOLUTION ORAL DAILY
Status: DISCONTINUED | OUTPATIENT
Start: 2024-04-14 | End: 2024-04-25 | Stop reason: HOSPADM

## 2024-04-13 RX ORDER — ENOXAPARIN SODIUM 100 MG/ML
30 INJECTION SUBCUTANEOUS 2 TIMES DAILY
Status: DISCONTINUED | OUTPATIENT
Start: 2024-04-13 | End: 2024-04-25 | Stop reason: HOSPADM

## 2024-04-13 RX ORDER — GABAPENTIN 100 MG/1
100 CAPSULE ORAL EVERY 8 HOURS SCHEDULED
Status: DISCONTINUED | OUTPATIENT
Start: 2024-04-13 | End: 2024-04-14

## 2024-04-13 RX ORDER — INSULIN LISPRO 100 [IU]/ML
0-4 INJECTION, SOLUTION INTRAVENOUS; SUBCUTANEOUS
Status: DISCONTINUED | OUTPATIENT
Start: 2024-04-13 | End: 2024-04-25 | Stop reason: HOSPADM

## 2024-04-13 RX ORDER — ACETAMINOPHEN 325 MG/1
650 TABLET ORAL EVERY 4 HOURS PRN
Status: DISCONTINUED | OUTPATIENT
Start: 2024-04-13 | End: 2024-04-15

## 2024-04-13 RX ORDER — SENNA AND DOCUSATE SODIUM 50; 8.6 MG/1; MG/1
2 TABLET, FILM COATED ORAL DAILY PRN
Status: DISCONTINUED | OUTPATIENT
Start: 2024-04-13 | End: 2024-04-15

## 2024-04-13 RX ADMIN — GABAPENTIN 100 MG: 100 CAPSULE ORAL at 20:32

## 2024-04-13 RX ADMIN — TAMSULOSIN HYDROCHLORIDE 0.4 MG: 0.4 CAPSULE ORAL at 08:27

## 2024-04-13 RX ADMIN — SODIUM CHLORIDE, PRESERVATIVE FREE 10 ML: 5 INJECTION INTRAVENOUS at 08:27

## 2024-04-13 RX ADMIN — INSULIN LISPRO 2 UNITS: 100 INJECTION, SOLUTION INTRAVENOUS; SUBCUTANEOUS at 13:15

## 2024-04-13 RX ADMIN — ACETAMINOPHEN 1000 MG: 500 TABLET ORAL at 06:06

## 2024-04-13 RX ADMIN — ENOXAPARIN SODIUM 30 MG: 100 INJECTION SUBCUTANEOUS at 20:32

## 2024-04-13 RX ADMIN — METHOCARBAMOL 750 MG: 750 TABLET ORAL at 06:06

## 2024-04-13 RX ADMIN — GABAPENTIN 100 MG: 100 CAPSULE ORAL at 14:12

## 2024-04-13 RX ADMIN — GABAPENTIN 100 MG: 100 CAPSULE ORAL at 06:06

## 2024-04-13 RX ADMIN — ACETAMINOPHEN 1000 MG: 500 TABLET ORAL at 20:32

## 2024-04-13 RX ADMIN — METHOCARBAMOL 750 MG: 750 TABLET ORAL at 20:33

## 2024-04-13 RX ADMIN — ATORVASTATIN CALCIUM 10 MG: 10 TABLET, FILM COATED ORAL at 20:32

## 2024-04-13 RX ADMIN — ACETAMINOPHEN 1000 MG: 500 TABLET ORAL at 14:11

## 2024-04-13 RX ADMIN — LEVOTHYROXINE SODIUM 100 MCG: 100 TABLET ORAL at 06:06

## 2024-04-13 RX ADMIN — METHOCARBAMOL 750 MG: 750 TABLET ORAL at 13:15

## 2024-04-13 RX ADMIN — METHOCARBAMOL 750 MG: 750 TABLET ORAL at 01:19

## 2024-04-13 RX ADMIN — ENOXAPARIN SODIUM 30 MG: 100 INJECTION SUBCUTANEOUS at 08:27

## 2024-04-13 ASSESSMENT — PAIN SCALES - GENERAL
PAINLEVEL_OUTOF10: 0
PAINLEVEL_OUTOF10: 2
PAINLEVEL_OUTOF10: 0
PAINLEVEL_OUTOF10: 0

## 2024-04-13 ASSESSMENT — PAIN DESCRIPTION - LOCATION: LOCATION: HIP

## 2024-04-13 ASSESSMENT — PAIN DESCRIPTION - ORIENTATION: ORIENTATION: RIGHT;LEFT

## 2024-04-13 ASSESSMENT — PAIN DESCRIPTION - DESCRIPTORS: DESCRIPTORS: ACHING

## 2024-04-13 ASSESSMENT — PAIN - FUNCTIONAL ASSESSMENT: PAIN_FUNCTIONAL_ASSESSMENT: ACTIVITIES ARE NOT PREVENTED

## 2024-04-13 NOTE — PROGRESS NOTES
ACUTE INPATIENT REHABILITATION  [facility]    Case Management Assessment: IRF CM 4.0     Patient Health Questionnaire-9 (PHQ-2 to 9)   Over the last 2 weeks, how often have you been bothered by any of the following problems?    1. Little Interest or pleasure in doing things?   Never or 1 Day - Score 0    2. Feeling down, depressed or hopeless?   Never or 1 Day - Score 0    3. Trouble falling or staying asleep, or sleeping too much?   Never or 1 Day - Score 0    4. Feeling tired or having little energy?   Never or 1 Day - Score 0    5. Poor appetite or overeating?   Never or 1 Day - Score 0    6. Feeling bad about yourself-or that you are a failure or have let yourself or your family down?   Never or 1 Day - Score 0    7. Trouble concentrating on things, such as reading the newspaper or watching television?    Never or 1 Day - Score 0    8. Moving or speaking so slowly that other people could have noticed? Or the opposite-being so fidgety or restless that you have been moving around a lot more than usual?  Never or 1 Day - Score 0    9. Thoughts that you would be better off dead or of hurting yourself in some way?   Never or 1 Day - Score 0    Total Score: 0  If score is above 15, Notify PM&R Physician    If you checked off any problems, how difficult have these problems made it for you to do your work, take care of things at home, or get along with other people?    Not Applicable - No Problems Identified         Social Isolation     How often do you feel lonely or isolated from those around you?  Never       Access To Transportation     2.In the past six months to a year, has lack of transportation kept you from medical appointments or from getting your medications?”     No    3.In the past six months to a year, has lack of transportation kept you from non-medical meetings, appointments, work, or from getting things that you need?     No

## 2024-04-13 NOTE — PROGRESS NOTES
PROGRESS NOTE          PATIENT NAME: Dariana Juan  MEDICAL RECORD NO. [id number]  DATE: 4/13/2024    HD: # 6      Patient Active Problem List   Diagnosis    Left displaced femoral neck fracture (HCC)    Closed fracture of left femur (HCC)    Stress fracture of right femur       DIAGNOSIS AND PLAN    Left femur fracture s/p l IMN 4/8/2024  R femur stress fracture s/p IMN 4/10  Anemia due to dilution and operative blood loss   Hg 6.9 ->, 1 U PRBC-> 8.2-> 8 point 3/13/2024  Urinary retention    Flomax added, appropriate urination currently  Lumbar pain   Plain films L spine and sacrum - negative      Afebrile vital signs stable on room air  Hemoglobin is 8.3 from 8.2 yesterday after unit of Blood given 4/11/2024     Voiding appropriately    On home medications plus DVT prophylaxis insulin coverage Multimodal pain therapy stool softeners and Flomax was added     She is requesting [facility] discharge, plan is for discharge to [facility] at 1400 today    No plan for further orthopedic interventions Optifoam bilateral lower extremities do not remove dressings weight-bear as tolerated bilateral lower extremities follow-up with orthopedics on 4/25        OBJECTIVE  VITALS:   Vitals:    04/13/24 0822   BP: 122/77   Pulse: 97   Resp: 18   Temp: 98.1 °F (36.7 °C)   SpO2: 97%     Physical Exam  Constitutional:       Appearance: She is normal weight.   HENT:      Head: Normocephalic and atraumatic.      Right Ear: External ear normal.      Left Ear: External ear normal.      Nose: Nose normal.      Mouth/Throat:      Mouth: Mucous membranes are moist.   Cardiovascular:      Rate and Rhythm: Normal rate and regular rhythm.      Pulses: Normal pulses.   Pulmonary:      Effort: Pulmonary effort is normal. No respiratory distress.   Abdominal:      General: There is no distension.      Palpations: Abdomen is soft.      Tenderness: There is no abdominal tenderness.   Musculoskeletal:      Cervical back: Normal range of

## 2024-04-13 NOTE — PROGRESS NOTES
Patient admitted with an order for NWB to RLE and WBAT to LLE.  Writer reached out to Dr. Caruso for clarification.    Per Dr. Caruso, patient is supposed to be WBAT to BLE.  Prior order discontinued.

## 2024-04-13 NOTE — PLAN OF CARE
Problem: Pain  Goal: Verbalizes/displays adequate comfort level or baseline comfort level  4/13/2024 1351 by Rina Pabon RN  Outcome: Completed       Problem: Skin/Tissue Integrity  Goal: Absence of new skin breakdown  Description: 1.  Monitor for areas of redness and/or skin breakdown  2.  Assess vascular access sites hourly  3.  Every 4-6 hours minimum:  Change oxygen saturation probe site  4.  Every 4-6 hours:  If on nasal continuous positive airway pressure, respiratory therapy assess nares and determine need for appliance change or resting period.  4/13/2024 1351 by Rina Pabon RN  Outcome: Completed    Problem: Safety - Adult  Goal: Free from fall injury  4/13/2024 1351 by Rina Pabon RN  Outcome: Completed       Problem: ABCDS Injury Assessment  Goal: Absence of physical injury  4/13/2024 1351 by Rina Pabon RN  Outcome: Completed       Problem: Discharge Planning  Goal: Discharge to home or other facility with appropriate resources  4/13/2024 1351 by Rina Pabon RN  Outcome: Completed

## 2024-04-13 NOTE — PROGRESS NOTES
RN gave report [place] ARU. All questions answered in full. Pt sent  with cell phone and belongings.

## 2024-04-13 NOTE — PROGRESS NOTES
1 Completing Skin Assessment Sada Jameson RN   Nurse 2 Completing Skin Assessment Any Murray LPN     Active pressure injuries or complex wounds identified? No  If yes: contact provider for wound care consult order []       Admission Weight   Patient's Weight Upon Admission  149.3 lb       Bowel and Bladder Functional Assessment   Prior history of bladder problems:  retention   Number of pads used per day: 3   Frequency of night time voiding:  twice   Fluid intake volume and pattern: TBD   Last Bowel Movement 4/13/24   Prior history of bowel problems:  No If Yes, Check All That Apply  []Incontinence   []Frequent Diarrhea  []Constipation   []Hemorrhoids  []Diverticulitis  []Bowel Surgery     Pain Assessment   Over the past 5 days, how much of the time has pain made it hard for you to sleep at night?   Rarely or not at all   Over the past 5 days, how often have you limited your participation in rehabilitation therapy sessions due to pain?   Occasionally   Over the past 5 days, how often have you limited your day-to-day activities (excluding rehabilitation therapy session)?   Occasionally     Special Treatments, Procedures, and Programs   Check all of the following treatments, procedures, and programs that apply on admission.    Cancer Treatments   Chemotherapy No   If Yes, Check All That Apply   []IV Chemotherapy   []Oral Chemotherapy    []Chemotherapy    Radiation No   Respiratory Therapies   Oxygen Therapy No  If Yes, Check All That Apply   []Continuous   []Intermittent   []High-Concentration    Suctioning No  If Yes, Check All That Apply   []Scheduled   []As Needed   Tracheostomy Care No   Invasive Mechanical Ventilator   (Ventilator or Respirator) No  If Yes, Check All That Apply   []Non-invasive Mechanical Ventilator   []BiPap   []CPAP   Other   IV Medications No  If Yes, Check All That Apply   []IV Vasoactive Medications   []IV Antibiotics   []IV Anticoagulation   []Other IV Medications   Transfusions No    Dialysis No  If Yes, Check All That Apply   []Hemodialysis   []Peritoneal Dialysis   IV Access No  If Yes, Check All That Apply   []Peripheral   []Midline   [](PICC, tunneled, port)       Admission folder with the following documents provided to patient/responsible party:   1. \"[facility] Individualized Disclosure Statement\"  2. \"Data Collection Information Summary for Patients in Inpatient Rehabilitation Facilities\"  3. \"Privacy Act Statement - Health Care Records\"    Care plan was created with patient/responsible party input and goals were agreed upon.      Please refer to the admission navigator for further information.

## 2024-04-13 NOTE — PLAN OF CARE
Problem: Pain  Goal: Verbalizes/displays adequate comfort level or baseline comfort level  4/13/2024 0431 by Odilia Puente RN  Outcome: Progressing  4/12/2024 1632 by Bebeto Richardson RN  Outcome: Progressing     Problem: Skin/Tissue Integrity  Goal: Absence of new skin breakdown  Description: 1.  Monitor for areas of redness and/or skin breakdown  2.  Assess vascular access sites hourly  3.  Every 4-6 hours minimum:  Change oxygen saturation probe site  4.  Every 4-6 hours:  If on nasal continuous positive airway pressure, respiratory therapy assess nares and determine need for appliance change or resting period.  4/13/2024 0431 by Odilia Puente RN  Outcome: Progressing  4/12/2024 1632 by Bebeto Richardosn RN  Outcome: Progressing     Problem: Safety - Adult  Goal: Free from fall injury  4/13/2024 0431 by Odilia Puente RN  Outcome: Progressing  4/12/2024 1632 by Bebeto Richardson RN  Outcome: Progressing     Problem: ABCDS Injury Assessment  Goal: Absence of physical injury  4/13/2024 0431 by Odilia Puente RN  Outcome: Progressing  4/12/2024 1632 by Bebeto Richardson RN  Outcome: Progressing     Problem: Discharge Planning  Goal: Discharge to home or other facility with appropriate resources  Outcome: Progressing

## 2024-04-14 PROBLEM — I95.1 ORTHOSTATIC HYPOTENSION: Status: ACTIVE | Noted: 2024-04-14

## 2024-04-14 LAB
ALBUMIN SERPL-MCNC: 3.4 G/DL (ref 3.5–5.2)
ALP SERPL-CCNC: 128 U/L (ref 35–104)
ALT SERPL-CCNC: 43 U/L (ref 5–33)
AMORPH SED URNS QL MICRO: ABNORMAL
ANION GAP SERPL CALCULATED.3IONS-SCNC: 10 MMOL/L (ref 9–17)
AST SERPL-CCNC: 23 U/L
BACTERIA URNS QL MICRO: ABNORMAL
BASOPHILS # BLD: 0 K/UL (ref 0–0.2)
BASOPHILS NFR BLD: 0 % (ref 0–2)
BILIRUB DIRECT SERPL-MCNC: 0.2 MG/DL
BILIRUB INDIRECT SERPL-MCNC: 0.8 MG/DL (ref 0–1)
BILIRUB SERPL-MCNC: 1 MG/DL (ref 0.3–1.2)
BILIRUB UR QL STRIP: NEGATIVE
BUN SERPL-MCNC: 21 MG/DL (ref 8–23)
CALCIUM SERPL-MCNC: 8.6 MG/DL (ref 8.6–10.4)
CHLORIDE SERPL-SCNC: 99 MMOL/L (ref 98–107)
CLARITY UR: CLEAR
CO2 SERPL-SCNC: 25 MMOL/L (ref 20–31)
COLOR UR: YELLOW
CREAT SERPL-MCNC: 0.7 MG/DL (ref 0.5–0.9)
EOSINOPHIL # BLD: 0.2 K/UL (ref 0–0.4)
EOSINOPHILS RELATIVE PERCENT: 2 % (ref 0–4)
EPI CELLS #/AREA URNS HPF: ABNORMAL /HPF
ERYTHROCYTE [DISTWIDTH] IN BLOOD BY AUTOMATED COUNT: 14.6 % (ref 11.5–14.9)
FOLATE SERPL-MCNC: >20 NG/ML (ref 4.8–24.2)
GFR SERPL CREATININE-BSD FRML MDRD: >90 ML/MIN/1.73M2
GLUCOSE BLD-MCNC: 164 MG/DL (ref 65–105)
GLUCOSE BLD-MCNC: 183 MG/DL (ref 65–105)
GLUCOSE BLD-MCNC: 184 MG/DL (ref 65–105)
GLUCOSE BLD-MCNC: 206 MG/DL (ref 65–105)
GLUCOSE SERPL-MCNC: 188 MG/DL (ref 70–99)
GLUCOSE UR STRIP-MCNC: NEGATIVE MG/DL
HCT VFR BLD AUTO: 27.3 % (ref 36–46)
HGB BLD-MCNC: 9.1 G/DL (ref 12–16)
HGB UR QL STRIP.AUTO: ABNORMAL
KETONES UR STRIP-MCNC: NEGATIVE MG/DL
LEUKOCYTE ESTERASE UR QL STRIP: ABNORMAL
LYMPHOCYTES NFR BLD: 1.3 K/UL (ref 1–4.8)
LYMPHOCYTES RELATIVE PERCENT: 13 % (ref 24–44)
MCH RBC QN AUTO: 30.3 PG (ref 26–34)
MCHC RBC AUTO-ENTMCNC: 33.2 G/DL (ref 31–37)
MCV RBC AUTO: 91.3 FL (ref 80–100)
MONOCYTES NFR BLD: 0.7 K/UL (ref 0.1–1.3)
MONOCYTES NFR BLD: 7 % (ref 1–7)
NEUTROPHILS NFR BLD: 78 % (ref 36–66)
NEUTS SEG NFR BLD: 7.9 K/UL (ref 1.3–9.1)
NITRITE UR QL STRIP: NEGATIVE
PH UR STRIP: 5 [PH] (ref 5–8)
PLATELET # BLD AUTO: 188 K/UL (ref 150–450)
PMV BLD AUTO: 10.5 FL (ref 6–12)
POTASSIUM SERPL-SCNC: 4.4 MMOL/L (ref 3.7–5.3)
PROT SERPL-MCNC: 6.4 G/DL (ref 6.4–8.3)
PROT UR STRIP-MCNC: NEGATIVE MG/DL
RBC # BLD AUTO: 2.99 M/UL (ref 4–5.2)
RBC #/AREA URNS HPF: ABNORMAL /HPF
SODIUM SERPL-SCNC: 134 MMOL/L (ref 135–144)
SP GR UR STRIP: 1.02 (ref 1–1.03)
UROBILINOGEN UR STRIP-ACNC: NORMAL EU/DL (ref 0–1)
WBC #/AREA URNS HPF: ABNORMAL /HPF
WBC OTHER # BLD: 10.1 K/UL (ref 3.5–11)

## 2024-04-14 PROCEDURE — 80048 BASIC METABOLIC PNL TOTAL CA: CPT

## 2024-04-14 PROCEDURE — 82947 ASSAY GLUCOSE BLOOD QUANT: CPT

## 2024-04-14 PROCEDURE — 97110 THERAPEUTIC EXERCISES: CPT

## 2024-04-14 PROCEDURE — 97530 THERAPEUTIC ACTIVITIES: CPT

## 2024-04-14 PROCEDURE — 97535 SELF CARE MNGMENT TRAINING: CPT

## 2024-04-14 PROCEDURE — 6360000002 HC RX W HCPCS: Performed by: NURSE PRACTITIONER

## 2024-04-14 PROCEDURE — 36415 COLL VENOUS BLD VENIPUNCTURE: CPT

## 2024-04-14 PROCEDURE — 97112 NEUROMUSCULAR REEDUCATION: CPT

## 2024-04-14 PROCEDURE — 85025 COMPLETE CBC W/AUTO DIFF WBC: CPT

## 2024-04-14 PROCEDURE — 51702 INSERT TEMP BLADDER CATH: CPT

## 2024-04-14 PROCEDURE — 6370000000 HC RX 637 (ALT 250 FOR IP): Performed by: NURSE PRACTITIONER

## 2024-04-14 PROCEDURE — 97163 PT EVAL HIGH COMPLEX 45 MIN: CPT

## 2024-04-14 PROCEDURE — 1180000000 HC REHAB R&B

## 2024-04-14 PROCEDURE — 84075 ASSAY ALKALINE PHOSPHATASE: CPT

## 2024-04-14 PROCEDURE — 81001 URINALYSIS AUTO W/SCOPE: CPT

## 2024-04-14 PROCEDURE — 6370000000 HC RX 637 (ALT 250 FOR IP): Performed by: PHYSICAL MEDICINE & REHABILITATION

## 2024-04-14 PROCEDURE — 84080 ASSAY ALKALINE PHOSPHATASES: CPT

## 2024-04-14 PROCEDURE — 99223 1ST HOSP IP/OBS HIGH 75: CPT | Performed by: INTERNAL MEDICINE

## 2024-04-14 PROCEDURE — 82746 ASSAY OF FOLIC ACID SERUM: CPT

## 2024-04-14 PROCEDURE — 80076 HEPATIC FUNCTION PANEL: CPT

## 2024-04-14 PROCEDURE — 97167 OT EVAL HIGH COMPLEX 60 MIN: CPT

## 2024-04-14 PROCEDURE — 99223 1ST HOSP IP/OBS HIGH 75: CPT | Performed by: PHYSICAL MEDICINE & REHABILITATION

## 2024-04-14 PROCEDURE — 51701 INSERT BLADDER CATHETER: CPT

## 2024-04-14 PROCEDURE — 51798 US URINE CAPACITY MEASURE: CPT

## 2024-04-14 RX ORDER — FLUDROCORTISONE ACETATE 0.1 MG/1
0.1 TABLET ORAL DAILY
Status: DISCONTINUED | OUTPATIENT
Start: 2024-04-15 | End: 2024-04-14

## 2024-04-14 RX ORDER — TAMSULOSIN HYDROCHLORIDE 0.4 MG/1
0.4 CAPSULE ORAL DAILY
Status: CANCELLED | OUTPATIENT
Start: 2024-04-15

## 2024-04-14 RX ORDER — METHOCARBAMOL 500 MG/1
500 TABLET, FILM COATED ORAL 3 TIMES DAILY
Status: DISCONTINUED | OUTPATIENT
Start: 2024-04-15 | End: 2024-04-25 | Stop reason: HOSPADM

## 2024-04-14 RX ORDER — METHOCARBAMOL 500 MG/1
500 TABLET, FILM COATED ORAL EVERY 6 HOURS
Status: DISCONTINUED | OUTPATIENT
Start: 2024-04-14 | End: 2024-04-14

## 2024-04-14 RX ORDER — GABAPENTIN 100 MG/1
100 CAPSULE ORAL 2 TIMES DAILY
Status: DISCONTINUED | OUTPATIENT
Start: 2024-04-15 | End: 2024-04-25 | Stop reason: HOSPADM

## 2024-04-14 RX ORDER — OXYCODONE HYDROCHLORIDE 5 MG/1
5 TABLET ORAL EVERY 4 HOURS PRN
Status: DISCONTINUED | OUTPATIENT
Start: 2024-04-14 | End: 2024-04-25 | Stop reason: HOSPADM

## 2024-04-14 RX ADMIN — METHOCARBAMOL 500 MG: 500 TABLET ORAL at 19:47

## 2024-04-14 RX ADMIN — INSULIN LISPRO 2 UNITS: 100 INJECTION, SOLUTION INTRAVENOUS; SUBCUTANEOUS at 07:35

## 2024-04-14 RX ADMIN — INSULIN LISPRO 2 UNITS: 100 INJECTION, SOLUTION INTRAVENOUS; SUBCUTANEOUS at 12:21

## 2024-04-14 RX ADMIN — METHOCARBAMOL 750 MG: 750 TABLET ORAL at 01:06

## 2024-04-14 RX ADMIN — POLYETHYLENE GLYCOL 3350 17 G: 17 POWDER, FOR SOLUTION ORAL at 07:36

## 2024-04-14 RX ADMIN — ATORVASTATIN CALCIUM 10 MG: 10 TABLET, FILM COATED ORAL at 19:48

## 2024-04-14 RX ADMIN — GABAPENTIN 100 MG: 100 CAPSULE ORAL at 06:08

## 2024-04-14 RX ADMIN — METHOCARBAMOL 500 MG: 500 TABLET ORAL at 12:21

## 2024-04-14 RX ADMIN — ENOXAPARIN SODIUM 30 MG: 100 INJECTION SUBCUTANEOUS at 07:35

## 2024-04-14 RX ADMIN — ACETAMINOPHEN 1000 MG: 500 TABLET ORAL at 12:48

## 2024-04-14 RX ADMIN — ENOXAPARIN SODIUM 30 MG: 100 INJECTION SUBCUTANEOUS at 19:48

## 2024-04-14 RX ADMIN — OXYCODONE HYDROCHLORIDE 5 MG: 5 TABLET ORAL at 19:48

## 2024-04-14 RX ADMIN — METHOCARBAMOL 750 MG: 750 TABLET ORAL at 06:08

## 2024-04-14 RX ADMIN — ACETAMINOPHEN 1000 MG: 500 TABLET ORAL at 06:08

## 2024-04-14 RX ADMIN — GABAPENTIN 100 MG: 100 CAPSULE ORAL at 12:48

## 2024-04-14 RX ADMIN — OXYCODONE HYDROCHLORIDE 5 MG: 5 SOLUTION ORAL at 10:19

## 2024-04-14 RX ADMIN — OXYCODONE HYDROCHLORIDE 5 MG: 5 TABLET ORAL at 15:47

## 2024-04-14 RX ADMIN — GABAPENTIN 100 MG: 100 CAPSULE ORAL at 19:47

## 2024-04-14 RX ADMIN — LEVOTHYROXINE SODIUM 100 MCG: 0.1 TABLET ORAL at 06:08

## 2024-04-14 RX ADMIN — TAMSULOSIN HYDROCHLORIDE 0.4 MG: 0.4 CAPSULE ORAL at 07:35

## 2024-04-14 RX ADMIN — ACETAMINOPHEN 1000 MG: 500 TABLET ORAL at 19:47

## 2024-04-14 ASSESSMENT — PAIN DESCRIPTION - ORIENTATION
ORIENTATION: RIGHT;LEFT
ORIENTATION: RIGHT;LEFT
ORIENTATION: LEFT;RIGHT
ORIENTATION: RIGHT;LEFT

## 2024-04-14 ASSESSMENT — PAIN SCALES - GENERAL
PAINLEVEL_OUTOF10: 4
PAINLEVEL_OUTOF10: 2
PAINLEVEL_OUTOF10: 4
PAINLEVEL_OUTOF10: 5
PAINLEVEL_OUTOF10: 3
PAINLEVEL_OUTOF10: 4
PAINLEVEL_OUTOF10: 4
PAINLEVEL_OUTOF10: 2
PAINLEVEL_OUTOF10: 3

## 2024-04-14 ASSESSMENT — PAIN DESCRIPTION - DESCRIPTORS
DESCRIPTORS: ACHING

## 2024-04-14 ASSESSMENT — PAIN - FUNCTIONAL ASSESSMENT
PAIN_FUNCTIONAL_ASSESSMENT: ACTIVITIES ARE NOT PREVENTED
PAIN_FUNCTIONAL_ASSESSMENT: PREVENTS OR INTERFERES SOME ACTIVE ACTIVITIES AND ADLS
PAIN_FUNCTIONAL_ASSESSMENT: PREVENTS OR INTERFERES WITH MANY ACTIVE NOT PASSIVE ACTIVITIES
PAIN_FUNCTIONAL_ASSESSMENT: PREVENTS OR INTERFERES WITH MANY ACTIVE NOT PASSIVE ACTIVITIES

## 2024-04-14 ASSESSMENT — PAIN DESCRIPTION - LOCATION
LOCATION: HIP

## 2024-04-14 NOTE — PROGRESS NOTES
Physical Therapy  Facility/Department: [facility] ACUTE REHAB  Physical Therapy Note    Name: Dariana Juan  : 1955  MRN: [id number]  Date of Service: 2024    Discharge Recommendations:  Patient would benefit from continued therapy after discharge          Patient Diagnosis(es): There were no encounter diagnoses.  Past Medical History:  has a past medical history of BRCA1 gene mutation positive, BRCA2 gene mutation positive, and Cancer (HCC).  Past Surgical History:  has a past surgical history that includes Breast biopsy;  section; Hysterectomy; Thyroid surgery; Cholecystectomy; Colonoscopy; bladder suspension; EMG; Femur Sarah nail insertion (2024); Tibia fracture surgery (Left, 2024); Femur Sarah nail insertion (Right, 04/10/2024); and Tibia fracture surgery (Right, 4/10/2024).    Assessment      Activity Tolerance  Activity Tolerance: Patient limited by endurance  Activity Tolerance Comments: patient with need for BM and toileting needs while in bed at this time NSG attending to patient at end of PT session     Plan   Physical Therapy Plan  General Plan:  minutes of therapy at least 5 out of 7 days a week  Specific Instructions for Next Treatment: monitor vitals progress as tolerated.  Safety Devices  Type of Devices: Left in bed, Call light within reach (Markie Rendon attending pt)     Restrictions  Restrictions/Precautions  Restrictions/Precautions: General Precautions, Fall Risk, Weight Bearing (bed alarm, romero placed this am)  Required Braces or Orthoses?: No  Lower Extremity Weight Bearing Restrictions  Right Lower Extremity Weight Bearing: Weight Bearing As Tolerated  Left Lower Extremity Weight Bearing: Weight Bearing As Tolerated  Position Activity Restriction  Other position/activity restrictions: Left subtrochanteric femur fracture s/p IMN, DOS: 24 - Right femoral shaft stress fracture s/p IMN, DOS: 4/10/24     Subjective   Pain: Pt reports 2/10 pain at rest in BLEs

## 2024-04-14 NOTE — PROGRESS NOTES
limitations of bilat LEs. She demonstrates significant issues with low  blood pressure limiting activity tolerance. Pt will benefit from skilled rehab here in ARU and likely after discharge.  Performance Deficits/Impairments: Decreased functional mobility ;Decreased ADL status;Decreased ROM;Decreased body mechanics;Decreased tolerance to work activity;Decreased strength;Decreased sensation;Decreased balance;Decreased coordination;Increased pain;Decreased posture  Activity Tolerance Comment:  (limited by pain , fatigue and dizziness)  Treatment Diagnosis: impaired mobility related to bilat femur fractures with ORIF  Therapy Prognosis: Good  Decision Making: High Complexity  Clinical Presentation: pt with BP control issues  Barriers to Learning: none  Treatment Initiated : eval , therex, positioning  Discharge Recommendations: Patient would benefit from continued therapy after discharge  PT D/C Equipment  Other: TBD, ramp  PT Equipment Recommendations  Equipment Needed: No  Other: TBD, ramp    CLINICAL IMPRESSION   Pt tolerated evaluation &  treatment well, but fatigued with efforts to stand and mobilize. Pt will need a mobility chair , not a wheelchair, for inital OOB seating due to LE weakness and ROM limitations of bilat LEs. She demonstrates low  blood pressure limiting activity tolerance. Pt will benefit from skilled rehab here in ARU and likely after discharge.    GOALS  Patient Goals   Patient Goals : To return home  Short Term Goals  Time Frame for Short Term Goals: 1week  Short Term Goal 1: Pt to complete bed mobility with min assist using compensatory strategies to manage legs on/off bed with leg strap device  Short Term Goal 2: Pt tolerate  unsupported sitting for static and dynamic activity ie ADL, therex with stable vital signs with SBA.  Short Term Goal 3: increase Bilat knee ROM 10-20 deg via positioning and gentle stretching.  Short Term Goal 4: tolerate standing in fabiola stedy vs // bars vs RW with min  assist x2 with stable vital signs.  Short Term Goal 5: pt to perform sit pivot vs slide board transfers bed to/from mobility chair vs w/c with min x2.  Long Term Goals  Time Frame for Long Term Goals : LOS  Long Term Goal 1: pt to perform sit to stand transfers with RW with mod assist  Long Term Goal 2:  (Pt to increase Bilat knee ROM to 0-90 degrees to allow for Sit to stands with minimal compensation)  Long Term Goal 3: Pt to increase BLE strength to 3+/5 as demonstrated by Sit to stands and increased independnece with all mobility.  Long Term Goal 4: ambulate with RW with min assist x2, 20-50 ft  Long Term Goal 5:  family demonstrates good understanding  of education regrading home modifications, body mechanics to assists pt safely  with transfers and mobility.    PLAN OF CARE  Frequency: 1-2 treatment sessions per day, 5-7 days per week  Physical Therapy Plan  General Plan:  minutes of therapy at least 5 out of 7 days a week  Specific Instructions for Next Treatment: monitor vitals, progress as tolerated.  Safety Devices  Type of Devices: All fall risk precautions in place;Call light within reach;Bed alarm in place;Gait belt;Patient at risk for falls;Left in bed;Nurse notified (AM: pt left in bed with bed alarm)    EDUCATION   POC    ELOS:          Therapy Time   Individual Concurrent Group Co-treatment   Time In 1100         Time Out 1208         Minutes 68                   Letitia Parker, PT, 04/14/24 at 1:49 PM

## 2024-04-14 NOTE — CONSULTS
Comprehensive Nutrition Assessment    Type and Reason for Visit:  Consult    Nutrition Recommendations/Plan:   Continue current diet  Start ONS     Malnutrition Assessment:  Malnutrition Status:  At risk for malnutrition (Comment) (04/14/24 1040)    Context:  Acute Illness     Findings of the 6 clinical characteristics of malnutrition:  Energy Intake:  No significant decrease in energy intake  Weight Loss:  No significant weight loss     Body Fat Loss:  No significant body fat loss     Muscle Mass Loss:  No significant muscle mass loss    Fluid Accumulation:  Mild Extremities   Strength:  Not Performed    Nutrition Assessment:    Pt admitted to ARU s/p left hip fx. Pt states fracture related to Fosamax and was found to also have a right fracture. Pt states that blood glucose has been elevated, recent A1c 6.2%.  Pt states that intake have been good overall, will offer Glucerna to better meet protein needs for healing.    Nutrition Related Findings:    Edema: BLE +1 Wound Type: None       Current Nutrition Intake & Therapies:    Average Meal Intake: 51-75%, %  Average Supplements Intake: None Ordered  ADULT DIET; Regular; 4 carb choices (60 gm/meal)  ADULT ORAL NUTRITION SUPPLEMENT; Breakfast, Lunch, Dinner; Diabetic Oral Supplement    Anthropometric Measures:  Height: 172.7 cm (5' 7.99\")  Ideal Body Weight (IBW): 140 lbs (64 kg)    Admission Body Weight: 67.7 kg (149 lb 4 oz)  Current Body Weight: 67.7 kg (149 lb 4 oz), 106.6 % IBW. Weight Source: Bed Scale  Current BMI (kg/m2): 22.7    BMI Categories: Overweight (BMI 25.0-29.9)    Estimated Daily Nutrient Needs:  Energy Requirements Based On: Kcal/kg  Weight Used for Energy Requirements: Admission  Energy (kcal/day): 1905 based on 28kcal/kg  Weight Used for Protein Requirements: Admission  Protein (g/day): 85 based on 1.25g/kg  Method Used for Fluid Requirements:  (Per physician)      Nutrition Diagnosis:   Increased nutrient needs related to acute

## 2024-04-14 NOTE — H&P
Physical Medicine & Rehabilitation History and Physical  [facility] Acute Rehabilitation Unit     Primary care provider: Kathryn Ortiz, APRN - CNP     Chief Complaint and Reason for Rehabilitation Admission:   Ambulatory and ADL dysfunction secondary to left subtrochanteric femur fracture and right femoral stress fracture status post IM nailing bilaterally    History of Present Illness:  Dariana Juan  is a 68 y.o. right-handed    female admitted to the [facility] Rehabiliation unit on 4/13/2024.         68-year-old female admitted [place] [facility] for 7/24 with fall and dislocation-she was noted playing hockey with her grandson and fell on her left leg had immediate pain found to have left femur fracture also noted to have right femoral stress fracture.  Patient takes biphosphonate's for osteoporosis and also has neuropathy of unknown origin.    Orthopedics left subtrochanteric femur fracture status post IM nailing 4/8 and right femoral stress fracture status post IM nailing 4/10, continue OptiForm dressing notify if increased saturation, weightbearing as tolerated bilateral lower extremities, Lovenox for DVT prophylaxis follow-up 4/25 with Dr. Foley    Trauma-urinary retention Flomax added was voiding better anemia status post transfusion    is currently requiring assistance for self-care activities and mobility prompting this admission.    Premorbid function:  Independent    Current Function:  PT:  Restrictions/Precautions: General Precautions, Fall Risk, Weight Bearing  Right Lower Extremity Weight Bearing: Weight Bearing As Tolerated  Left Lower Extremity Weight Bearing: Weight Bearing As Tolerated         Bed mobility  Supine to Sit: Maximum assistance (therapist assisting with progression of B LEs to EOB then HHA to progress trunk)  Sit to Supine: Moderate assistance;2 Person assistance  Scooting: Moderate assistance  Bed Mobility Comments: increased time required for    BILITOT 1.0   ALKPHOS 128*        I/O (24Hr):    Intake/Output Summary (Last 24 hours) at 4/14/2024 1124  Last data filed at 4/14/2024 0921  Gross per 24 hour   Intake --   Output 1850 ml   Net -1850 ml       Glu last 24 hour  Recent Labs     04/13/24  1157 04/13/24  1612 04/14/24  0611 04/14/24  1112   POCGLU 191* 169* 183* 184*       No results for input(s): \"CLARITYU\", \"COLORU\", \"PHUR\", \"SPECGRAV\", \"PROTEINU\", \"RBCUA\", \"BLOODU\", \"BACTERIA\", \"NITRU\", \"WBCUA\", \"LEUKOCYTESUR\", \"YEAST\", \"GLUCOSEU\", \"BILIRUBINUR\" in the last 72 hours.       Social History:  Social/Functional History  Lives With: Spouse  Type of Home: House  Home Layout: One level  Home Access: Stairs to enter without rails  Entrance Stairs - Number of Steps: 3 no railing , full flight  to basement  Bathroom Shower/Tub: Tub/Shower unit  Bathroom Toilet: Standard  Bathroom Accessibility: Not accessible  Home Equipment: Walker, standard, Walker, 4 wheeled  Has the patient had two or more falls in the past year or any fall with injury in the past year?: Yes  Receives Help From: Family  ADL Assistance: Independent  Homemaking Assistance: Independent  Homemaking Responsibilities: Yes  Meal Prep Responsibility: Primary  Laundry Responsibility: Primary  Cleaning Responsibility: Primary  Dependent Care Responsibility: Primary  Ambulation Assistance: Independent  Transfer Assistance: Independent  Active : Yes  Mode of Transportation: Car  Occupation: Retired  Type of Occupation: secratary  Leisure & Hobbies: garden and flowers playing with grand kids    Notes spouse has cancer she is caregiver    Family History:       Problem Relation Age of Onset    Breast Cancer Mother 64    Breast Cancer Maternal Grandmother 70    Breast Cancer Paternal Grandmother 70       Review of Systems:  CONSTITUTIONAL:  Denies fevers, chills, sweats or fatigue.  EYES:  Denies diplopia, blind spots, blurring.  HEENT:  Denies hearing loss, trouble chewing or

## 2024-04-14 NOTE — PROGRESS NOTES
[facility]   Acute Rehabilitation Occupational Therapy Evaluation     Date: 24  Patient Name: Dariana Juan       Room: 2639/2639-01  MRN: [id number]  Account: [id number]   : 1955  (68 y.o.) Gender: female       Referring Practitioner: Aaron Rodrigez MD  Diagnosis: Left subtrochanteric femur fracture and right femoral stress fracture status post IM nailing-felt to be related biphosphonate  Additional Pertinent Hx: Ms. Dariana Juan is a 68 y.o. female who was admitted to [facility] on 2024 with Fall and Dislocation. 68-year-old female admitted [facility] for left thigh pain and deformity-noted playing hockey with her grandson and fell on her left leg she had immediate pain found to have left femur fracture and baseline lives with her spouse is independent spouse has cancer and is unable provide much assistance.  Patient takes biphosphonate's for osteoporosis and neuropathy of unknown origin. Ortho-left subtrochanteric femur fracture status post IMN and right femoral shaft stress fracture plan to go to the OR/10/24 for treatment right femoral shaft stress fracture weightbearing as tolerated left lower extremity nonweightbearing right lower extremity. Felt to be biphosphonate related fracture. Trauma-as above. Pt admitted to ARU on 2024.    Treatment Diagnosis: Impaired self-care status    Past Medical History:  has a past medical history of BRCA1 gene mutation positive, BRCA2 gene mutation positive, and Cancer (HCC).    Past Surgical History:   has a past surgical history that includes Breast biopsy;  section; Hysterectomy; Thyroid surgery; Cholecystectomy; Colonoscopy; bladder suspension; EMG; Femur Sarah nail insertion (2024); Tibia fracture surgery (Left, 2024); Femur Sarah nail insertion (Right, 04/10/2024); and Tibia fracture surgery (Right, 4/10/2024).    Restrictions  Lower Extremity Weight Bearing Restrictions  Right Lower Extremity

## 2024-04-14 NOTE — PLAN OF CARE
Problem: Discharge Planning  Goal: Discharge to home or other facility with appropriate resources  4/13/2024 2201 by Dary Tejada RN  Outcome: Progressing  Flowsheets (Taken 4/13/2024 2201)  Discharge to home or other facility with appropriate resources:   Identify barriers to discharge with patient and caregiver   Arrange for needed discharge resources and transportation as appropriate   Identify discharge learning needs (meds, wound care, etc)     Problem: Safety - Adult  Goal: Free from fall injury  4/13/2024 2201 by Dary Tejada RN  Outcome: Progressing  Flowsheets (Taken 4/13/2024 2201)  Free From Fall Injury: Instruct family/caregiver on patient safety     Problem: Skin/Tissue Integrity  Goal: Absence of new skin breakdown  Description: 1.  Monitor for areas of redness and/or skin breakdown  2.  Assess vascular access sites hourly  3.  Every 4-6 hours minimum:  Change oxygen saturation probe site  4.  Every 4-6 hours:  If on nasal continuous positive airway pressure, respiratory therapy assess nares and determine need for appliance change or resting period.  4/13/2024 2201 by Dary Tejada RN  Outcome: Progressing     Problem: ABCDS Injury Assessment  Goal: Absence of physical injury  4/13/2024 2201 by Dary Tejada RN  Outcome: Progressing  Flowsheets (Taken 4/13/2024 2201)  Absence of Physical Injury: Implement safety measures based on patient assessment     Problem: Pain  Goal: Verbalizes/displays adequate comfort level or baseline comfort level  Outcome: Progressing  Flowsheets (Taken 4/13/2024 2201)  Verbalizes/displays adequate comfort level or baseline comfort level:   Encourage patient to monitor pain and request assistance   Assess pain using appropriate pain scale   Administer analgesics based on type and severity of pain and evaluate response   Implement non-pharmacological measures as appropriate and evaluate response

## 2024-04-14 NOTE — CONSULTS
[facility]   IN-PATIENT SERVICE   [facility]    HISTORY AND PHYSICAL EXAMINATION            Date:   2024  Patient name:  Dariana Juan  Date of admission:  2024  2:37 PM  MRN:   [id number]  Account:  [id number]  YOB: 1955  PCP:    Kathryn Ortiz APRN - CNP  Room:   [address] [place] [address]  Code Status:    Prior    Chief Complaint:     Rehab    History Obtained From:     patient    History of Present Illness:     The patient is a 68 y.o.  Non- / non  female who presents with No chief complaint on file.   and she is admitted to the rehab facility for therapy.    68-year-old female with history of osteoporosis, has been on bisphosphonates for several years now, with recommendations to switch to Prolia as per her primary physician given poor response to bisphosphonates, initially presented to outside hospital with left-sided femur fracture.  This was while she was playing hockey with her grandson.  While there she was also noted to have a right sided stress fracture.    Her past medical history significant for melanoma, BRCA gene mutation.    Past Medical History:     Past Medical History:   Diagnosis Date    BRCA1 gene mutation positive     BRCA2 gene mutation positive     Cancer (HCC)     melonoma, left arm, melanoma back of neck        Past Surgical History:     Past Surgical History:   Procedure Laterality Date    BLADDER SUSPENSION      prolapse repair    BREAST BIOPSY      three titaanium markers in left breast      SECTION      CHOLECYSTECTOMY      COLONOSCOPY      EMG          FEMUR COMFORT NAIL INSERTION  2024    FEMUR COMFORT NAIL INSERTION Right 04/10/2024    PROPHYLACTIC INTRAMEDULLARY NAIL INSERTION FEMUR    HYSTERECTOMY (CERVIX STATUS UNKNOWN)      has lone ovary    THYROID SURGERY      TIBIA FRACTURE SURGERY Left 2024    FEMORAL INTRAMEDULLARY NAIL INSERTION performed by Patricio Caruso DO at [facility] OR    TIBIA FRACTURE  Diagnosis Date Noted    Closed left hip fracture, sequela [S72.002S] 04/13/2024       Plan:     Patient status Admit as inpatient in the rehab    Left femur fracture, s/p IM nailing 4/8/2024  Right femoral stress fracture s/p IM nailing 4/10/2024  Orthostatic hypotension, patient does not look volume depleted on clinical exam.  Will check urine sodium and urine osmolarity, a.m. cortisol levels. B12 levels are wnl For now would like to hold off on empirically starting Florinef given the patient has significant osteoporosis.  Will consider midodrine till testing completed  Osteoporosis, workup and management as per outpatient  Urinary retention, currently with a Quintero.  Tamsulosin held given the patient is orthostatic.  Normocytic normochromic anemia, no recent labs on the patient.  Back in 2017 hemoglobin was normal.  Currently with no evidence of bleeding.  Will monitor for now.  Hyponatremia, recommend monitoring for now.  Patient euvolemic on exam  Elevated alk phos levels    Consultations:   IP CONSULT TO DIETITIAN  IP CONSULT TO SOCIAL WORK  IP CONSULT TO INTERNAL MEDICINE  IP CONSULT TO UROLOGY    Patient is admitted as inpatient status because of co-morbidities listed above, severity of signs and symptoms as outlined, requirement for current medical therapies and most importantly because of direct risk to patient if care not provided in a hospital setting.    Luzmaria Brock MD  4/14/2024  2:02 PM    Copy sent to Dr. Ortiz, Kathryn MADDEN, APRN - CNP    Please note that this chart was generated using voice recognition Dragon dictation software.  Although every effort was made to ensure the accuracy of this automated transcription, some errors in transcription may have occurred.

## 2024-04-14 NOTE — PROGRESS NOTES
Consulted Dr. Benavides, per Dr. Rodrigez's order.   Dr. Benavides stated he will be by tomorrow to see patient.

## 2024-04-15 LAB
CORTIS SERPL-MCNC: 15.1 UG/DL (ref 2.5–19.5)
GLUCOSE BLD-MCNC: 165 MG/DL (ref 65–105)
GLUCOSE BLD-MCNC: 172 MG/DL (ref 65–105)
GLUCOSE BLD-MCNC: 174 MG/DL (ref 65–105)

## 2024-04-15 PROCEDURE — 99233 SBSQ HOSP IP/OBS HIGH 50: CPT | Performed by: INTERNAL MEDICINE

## 2024-04-15 PROCEDURE — 6360000002 HC RX W HCPCS: Performed by: NURSE PRACTITIONER

## 2024-04-15 PROCEDURE — 97110 THERAPEUTIC EXERCISES: CPT

## 2024-04-15 PROCEDURE — 87186 SC STD MICRODIL/AGAR DIL: CPT

## 2024-04-15 PROCEDURE — 36415 COLL VENOUS BLD VENIPUNCTURE: CPT

## 2024-04-15 PROCEDURE — 82947 ASSAY GLUCOSE BLOOD QUANT: CPT

## 2024-04-15 PROCEDURE — 87088 URINE BACTERIA CULTURE: CPT

## 2024-04-15 PROCEDURE — 99232 SBSQ HOSP IP/OBS MODERATE 35: CPT | Performed by: PHYSICAL MEDICINE & REHABILITATION

## 2024-04-15 PROCEDURE — 97530 THERAPEUTIC ACTIVITIES: CPT

## 2024-04-15 PROCEDURE — 97535 SELF CARE MNGMENT TRAINING: CPT

## 2024-04-15 PROCEDURE — 51702 INSERT TEMP BLADDER CATH: CPT

## 2024-04-15 PROCEDURE — 1180000000 HC REHAB R&B

## 2024-04-15 PROCEDURE — 6370000000 HC RX 637 (ALT 250 FOR IP): Performed by: PHYSICAL MEDICINE & REHABILITATION

## 2024-04-15 PROCEDURE — 6370000000 HC RX 637 (ALT 250 FOR IP): Performed by: INTERNAL MEDICINE

## 2024-04-15 PROCEDURE — 87086 URINE CULTURE/COLONY COUNT: CPT

## 2024-04-15 PROCEDURE — 82533 TOTAL CORTISOL: CPT

## 2024-04-15 PROCEDURE — 6370000000 HC RX 637 (ALT 250 FOR IP): Performed by: NURSE PRACTITIONER

## 2024-04-15 RX ORDER — COSYNTROPIN 0.25 MG/ML
250 INJECTION, POWDER, FOR SOLUTION INTRAMUSCULAR; INTRAVENOUS ONCE
Status: COMPLETED | OUTPATIENT
Start: 2024-04-16 | End: 2024-04-16

## 2024-04-15 RX ADMIN — ACETAMINOPHEN 1000 MG: 500 TABLET ORAL at 06:01

## 2024-04-15 RX ADMIN — METHOCARBAMOL 500 MG: 500 TABLET ORAL at 12:29

## 2024-04-15 RX ADMIN — LEVOTHYROXINE SODIUM 100 MCG: 0.1 TABLET ORAL at 06:01

## 2024-04-15 RX ADMIN — ENOXAPARIN SODIUM 30 MG: 100 INJECTION SUBCUTANEOUS at 21:04

## 2024-04-15 RX ADMIN — GABAPENTIN 100 MG: 100 CAPSULE ORAL at 08:36

## 2024-04-15 RX ADMIN — POLYETHYLENE GLYCOL 3350 17 G: 17 POWDER, FOR SOLUTION ORAL at 08:39

## 2024-04-15 RX ADMIN — ACETAMINOPHEN 1000 MG: 500 TABLET ORAL at 12:28

## 2024-04-15 RX ADMIN — ATORVASTATIN CALCIUM 10 MG: 10 TABLET, FILM COATED ORAL at 21:04

## 2024-04-15 RX ADMIN — OXYCODONE HYDROCHLORIDE 5 MG: 5 TABLET ORAL at 08:36

## 2024-04-15 RX ADMIN — ENOXAPARIN SODIUM 30 MG: 100 INJECTION SUBCUTANEOUS at 08:35

## 2024-04-15 RX ADMIN — METHOCARBAMOL 500 MG: 500 TABLET ORAL at 08:39

## 2024-04-15 RX ADMIN — GABAPENTIN 100 MG: 100 CAPSULE ORAL at 21:04

## 2024-04-15 RX ADMIN — ACETAMINOPHEN 1000 MG: 500 TABLET ORAL at 21:04

## 2024-04-15 RX ADMIN — OXYCODONE HYDROCHLORIDE 5 MG: 5 TABLET ORAL at 12:28

## 2024-04-15 RX ADMIN — METHOCARBAMOL 500 MG: 500 TABLET ORAL at 21:04

## 2024-04-15 ASSESSMENT — 9 HOLE PEG TEST
TESTTIME_SECONDS: 26.98
TEST_RESULT: FUNCTIONAL
TEST_RESULT: FUNCTIONAL
TESTTIME_SECONDS: 28.25

## 2024-04-15 ASSESSMENT — PAIN DESCRIPTION - DESCRIPTORS
DESCRIPTORS: ACHING
DESCRIPTORS: ACHING

## 2024-04-15 ASSESSMENT — PAIN SCALES - GENERAL
PAINLEVEL_OUTOF10: 0
PAINLEVEL_OUTOF10: 2
PAINLEVEL_OUTOF10: 0
PAINLEVEL_OUTOF10: 2

## 2024-04-15 ASSESSMENT — PAIN - FUNCTIONAL ASSESSMENT
PAIN_FUNCTIONAL_ASSESSMENT: PREVENTS OR INTERFERES SOME ACTIVE ACTIVITIES AND ADLS
PAIN_FUNCTIONAL_ASSESSMENT: ACTIVITIES ARE NOT PREVENTED
PAIN_FUNCTIONAL_ASSESSMENT: PREVENTS OR INTERFERES WITH MANY ACTIVE NOT PASSIVE ACTIVITIES

## 2024-04-15 ASSESSMENT — PAIN DESCRIPTION - ORIENTATION
ORIENTATION: RIGHT;LEFT
ORIENTATION: LEFT;RIGHT
ORIENTATION: RIGHT;LEFT

## 2024-04-15 ASSESSMENT — PAIN DESCRIPTION - LOCATION
LOCATION: HIP
LOCATION: HIP
LOCATION: LEG

## 2024-04-15 ASSESSMENT — PAIN DESCRIPTION - FREQUENCY: FREQUENCY: INTERMITTENT

## 2024-04-15 NOTE — PROGRESS NOTES
[facility]   Acute Rehabilitation Occupational Therapy Daily Treatment Note    Date: 4/15/24  Patient Name: Dariana Juan       Room: 2639/2639-01  MRN: [id number]  Account: [id number]   : 1955  (68 y.o.) Gender: female       Referring Practitioner: Aaron Rodrigez MD  Diagnosis: Left subtrochanteric femur fracture and right femoral stress fracture status post IM nailing-felt to be related biphosphonate  Additional Pertinent Hx: Ms. Dariana Juan is a 68 y.o. female who was admitted to [facility] on 2024 with Fall and Dislocation. 68-year-old female admitted [facility] for left thigh pain and deformity-noted playing hockey with her grandson and fell on her left leg she had immediate pain found to have left femur fracture and baseline lives with her spouse is independent spouse has cancer and is unable provide much assistance.  Patient takes biphosphonate's for osteoporosis and neuropathy of unknown origin. Ortho-left subtrochanteric femur fracture status post IMN and right femoral shaft stress fracture plan to go to the OR/10/24 for treatment right femoral shaft stress fracture weightbearing as tolerated left lower extremity nonweightbearing right lower extremity. Felt to be biphosphonate related fracture. Trauma-as above. Pt admitted to ARU on 2024.    Treatment Diagnosis: Impaired self-care status    Past Medical History:  has a past medical history of BRCA1 gene mutation positive, BRCA2 gene mutation positive, and Cancer (HCC).    Past Surgical History:   has a past surgical history that includes Breast biopsy;  section; Hysterectomy; Thyroid surgery; Cholecystectomy; Colonoscopy; bladder suspension; EMG; Femur Sarah nail insertion (2024); Tibia fracture surgery (Left, 2024); Femur Sarah nail insertion (Right, 04/10/2024); and Tibia fracture surgery (Right, 4/10/2024).    Restrictions  Restrictions/Precautions  Restrictions/Precautions:  General Precautions, Fall Risk, Weight Bearing (bed alarm, romero placed this am)  Required Braces or Orthoses?: No  Implants present? : Metal implants (L femur IMN)     Position Activity Restriction  Other position/activity restrictions: Left subtrochanteric femur fracture s/p IMN, DOS: 4/8/24 - Right femoral shaft stress fracture s/p IMN, DOS: 4/10/24  Lower Extremity Weight Bearing Restrictions  Right Lower Extremity Weight Bearing: Weight Bearing As Tolerated  Left Lower Extremity Weight Bearing: Weight Bearing As Tolerated       Vitals  Vital Signs  O2 Device: None (Room air)  Comment: AM: Pt reported dizziness with change in position from reclined in eraly mobility chair to sitting fully upright BP assessed 77/45. Pt returned to reclined postion with BP returning to 118/71. ABD binder donned at end of session with BP sitting fully upright in chair assessed again with BP at 108/70. Pt returned to comfortable position. PM: Pt transfer process from early mobility chair, to [facility], standing a minute at a time 2 times, to supine in bed. BP assessed  during transfers with BP able to maintain WFL. Pt denies dizziness with change in positon this PM. ABD binder donned during transfers.     Subjective  Subjective  Subjective: \"If only we could get my blood pressure in check.\" Pt was pleasant and agreeable to OT session  Pain: Pt reports 3-4/10 pain in L thigh/hip       Objective  Cognition  Overall Orientation Status: Within Functional Limits       Cognition  Overall Cognitive Status: WFL    Activities of Daily Living  Feeding  Assistance Level: Set-up    Grooming/Oral Hygiene  Assistance Level: Stand by assist  Skilled Clinical Factors: Sitting in early mobility to complete oral care and additional grooming tasks. SBA for safety due to dizziness with movement    Pt declines all ADLs at this time. Education provided on AE to assist with lower body self care tasks.     Mobility              Sit to Supine  Assistance

## 2024-04-15 NOTE — PROGRESS NOTES
[facility]   IN-PATIENT SERVICE   [facility]    Progress note            Date:   4/15/2024  Patient name:  Dariana Juan  Date of admission:  2024  2:37 PM  MRN:   [id number]  Account:  [id number]  YOB: 1955  PCP:    Kathryn Ortiz APRN - CNP  Room:   [address] [place] [address]  Code Status:    Full Code    Chief Complaint:     Rehab    History Obtained From:     patient    History of Present Illness:     The patient is a 68 y.o.  Non- / non  female who presents with No chief complaint on file.   and she is admitted to the rehab facility for therapy.    68-year-old female with history of osteoporosis, has been on bisphosphonates for several years now, with recommendations to switch to Prolia as per her primary physician given poor response to bisphosphonates, initially presented to outside hospital with left-sided femur fracture.  This was while she was playing hockey with her grandson.  While there she was also noted to have a right sided stress fracture.    Her past medical history significant for melanoma, BRCA gene mutation.    4/15/2024  Orthostats continue to be positive, denies symptoms when laying in bed.    Past Medical History:     Past Medical History:   Diagnosis Date    BRCA1 gene mutation positive     BRCA2 gene mutation positive     Cancer (HCC)     melonoma, left arm, melanoma back of neck        Past Surgical History:     Past Surgical History:   Procedure Laterality Date    BLADDER SUSPENSION      prolapse repair    BREAST BIOPSY      three titaanium markers in left breast      SECTION      CHOLECYSTECTOMY      COLONOSCOPY      EMG          FEMUR COMFORT NAIL INSERTION  2024    FEMUR COMFORT NAIL INSERTION Right 04/10/2024    PROPHYLACTIC INTRAMEDULLARY NAIL INSERTION FEMUR    HYSTERECTOMY (CERVIX STATUS UNKNOWN)      has lone ovary    THYROID SURGERY      TIBIA FRACTURE SURGERY Left 2024    FEMORAL INTRAMEDULLARY NAIL  MD Janis        Allergies:     Iodinated contrast media and Penicillins    Social History:     Tobacco:    reports that she has never smoked. She has never used smokeless tobacco.  Alcohol:      has no history on file for alcohol use.  Drug Use:  has no history on file for drug use.    Family History:     Family History   Problem Relation Age of Onset    Breast Cancer Mother 64    Breast Cancer Maternal Grandmother 70    Breast Cancer Paternal Grandmother 70       Review of Systems:     Positive and Negative as described in HPI.    CONSTITUTIONAL:  negative for fevers, chills, sweats, fatigue, weight loss  HEENT:  negative for vision, hearing changes, runny nose, throat pain  RESPIRATORY:  negative for shortness of breath, cough, congestion, wheezing.  CARDIOVASCULAR:  negative for chest pain, palpitations.  GASTROINTESTINAL:  negative for nausea, vomiting, diarrhea, constipation, change in bowel habits, abdominal pain   GENITOURINARY:  negative for difficulty of urination, burning with urination, frequency   INTEGUMENT:  negative for rash, skin lesions, easy bruising   HEMATOLOGIC/LYMPHATIC:  negative for swelling/edema   ALLERGIC/IMMUNOLOGIC:  negative for urticaria , itching  ENDOCRINE:  negative increase in drinking, increase in urination, hot or cold intolerance  MUSCULOSKELETAL: Pain  NEUROLOGICAL:  negative for headaches, dizziness, lightheadedness, numbness, pain, tingling extremities  BEHAVIOR/PSYCH:  negative for depression, anxiety    Physical Exam:   /74   Pulse 89   Temp 98.1 °F (36.7 °C)   Resp 18   Ht 1.727 m (5' 7.99\")   Wt 62.1 kg (136 lb 12.8 oz)   SpO2 100%   BMI 20.81 kg/m²   Temp (24hrs), Av.1 °F (36.7 °C), Min:98 °F (36.7 °C), Max:98.2 °F (36.8 °C)    Recent Labs     24  1542 24  2052 04/15/24  0548 04/15/24  1111   POCGLU 164* 206* 172* 165*         Intake/Output Summary (Last 24 hours) at 4/15/2024 1823  Last data filed at 4/15/2024 0606  Gross per 24 hour

## 2024-04-15 NOTE — PROGRESS NOTES
[facility] ([facility]) Inpatient Discharge Summary  AMBER Castro  4/15/2024        Dariana Juan  1955  [id number]    Date & Time of Discharge: 4/13/2024  2:28 PM       Services provided:  Screenings: ITSS    Screen Results (If any):      ITSS:  Date Screen Completed: 4/9/24  Patient's score= 1 for PTSD risk. ( ? 2 is positive for PTSD risk. )  Patient's score= 1 for depression risk.  ( ? 2 is positive for Depression risk )      Discharge Recommendations:  No outpatient mental health services recommended      The therapist may be reached through the [facility] offices at [phone].

## 2024-04-15 NOTE — PLAN OF CARE
Problem: Discharge Planning  Goal: Discharge to home or other facility with appropriate resources  4/14/2024 2257 by Dary Tejada RN  Outcome: Progressing  Flowsheets (Taken 4/14/2024 2257)  Discharge to home or other facility with appropriate resources:   Identify barriers to discharge with patient and caregiver   Arrange for needed discharge resources and transportation as appropriate   Identify discharge learning needs (meds, wound care, etc)     Problem: Safety - Adult  Goal: Free from fall injury  4/14/2024 2257 by Dary Tejada RN  Outcome: Progressing  Flowsheets (Taken 4/14/2024 2257)  Free From Fall Injury: Instruct family/caregiver on patient safety     Problem: Skin/Tissue Integrity  Goal: Absence of new skin breakdown  Description: 1.  Monitor for areas of redness and/or skin breakdown  2.  Assess vascular access sites hourly  3.  Every 4-6 hours minimum:  Change oxygen saturation probe site  4.  Every 4-6 hours:  If on nasal continuous positive airway pressure, respiratory therapy assess nares and determine need for appliance change or resting period.  4/14/2024 2257 by Dary Tejada RN  Outcome: Progressing     Problem: ABCDS Injury Assessment  Goal: Absence of physical injury  4/14/2024 2257 by Dary Tejada RN  Outcome: Progressing  Flowsheets (Taken 4/14/2024 2257)  Absence of Physical Injury: Implement safety measures based on patient assessment     Problem: Pain  Goal: Verbalizes/displays adequate comfort level or baseline comfort level  4/14/2024 2257 by Dary Tejada RN  Outcome: Progressing  Flowsheets (Taken 4/14/2024 2257)  Verbalizes/displays adequate comfort level or baseline comfort level:   Encourage patient to monitor pain and request assistance   Assess pain using appropriate pain scale   Administer analgesics based on type and severity of pain and evaluate response   Implement non-pharmacological measures as appropriate and evaluate response

## 2024-04-15 NOTE — PLAN OF CARE
may include therapeutic exercises, gait training, neuromuscular re-ed, transfer training, community reintegration, bed mobility, w/c mobility and training.      OCCUPATIONAL THERAPY:  Goals:             Short Term Goals  Time Frame for Short Term Goals: 7-10 days  Short Term Goal 1: Pt will perform UB bathing/dressing set-up with Good safety  Short Term Goal 2: Pt will perform LB bathing/dressing Mod A with Good safety and use of AE/modified techniques as needed  Short Term Goal 3: Pt will perform functional transfers Mod A during self-care tasks with Good safety and use of least restrictive device  Short Term Goal 4: Pt will tolerate sitting EOB 10+ minutes during functional activity of choice SBA to improve dynamic balance and reaching outside JUAN for participation in self-care tasks  Short Term Goal 5: Pt will verbalize/demonstrate Good understanding of AE/DME/modified techniques to promote ease and independence with self-care tasks  Short Term Goal 6: Pt will verbalize/demonstrate Good understanding of 2-3 non-pharmaceutical pain management strategies to increase participation in self-care tasks and to improve quality of life  Short Term Goal 7: Pt will verbalize/demonstrate Good understanding of EC/WS strategies to increase participation and safety during daily activities  Short Term Goal 8: Pt will actively participate in 30+ minutes of therapeutic exercise/functional activity/social participation to promote safety and independence with self-care tasks and to improve overall quality of life  Long Term Goals  Time Frame for Long Term Goals : By discharge  Long Term Goal 1: Pt will perform UB bathing/dressing Mod I with Good safety  Long Term Goal 2: Pt will perform LB bathing/dressing Min A with Good safety and use of AE/modified techniques as needed  Long Term Goal 3: Pt will perform functional transfers/mobility Min A during self-care tasks with Good safety and use of least restrictive device  Long Term Goal  4: Pt will tolerate standing 8+ minutes during functional activity of choice Min A to improve balance/tolerance for self-care tasks  Long Term Goal 5: Pt will perform tub transfer Min A with Good safety and appropriate use of AE/DME as needed  Long Term Goal 6: Pt will verbalize/demonstrate Good understanding of home safety/fall prevention strategies to promote safety and independence with daily activities  Long Term Goal 7: (Goal met with pt ERINL - Cam Suero OTR/L 4/15/24) OT to further assess fine motor control/coordination using the 9 hole peg test (inform OTR when complete to update goal)  Long Term Goal 8: (Goal met with pt WFL - Cam Suero OTR/L 4/15/24) OT to further assess hand/ strength using the hand dynamometer (Inform OTR when complete to update goal)    Plan of Care: Patient to be seen by occupational therapy services 1 Hour 30 Minutes per day at least 5 out of 7 days per week     Anticipated interventions may include ADL and IADL retraining, strengthening, safety education and training, patient/caregiver education and training, equipment evaluation/ training/procurement, neuromuscular reeducation, wheelchair mobility training.      SPEECH THERAPY:   Goals:      \N/A      CASE MANAGEMENT:  Goals:   Assist patient/family with discharge planning, patient/family counseling,  and coordination with insurance during the inpatient rehabilitation stay.         Other members of the multidisciplinary rehabilitation team that will be involved in the patient's plan of care include recreational therapy, dietary, respiratory therapy, and neuropsychology.    Medical issues being managed closely and that require 24 hour availability of a physician:  Bowel/Bladder function, Weight bearing precautions, Wound care, Pain management, Infection protection, DVT prophylaxis, Fall precautions, Fluid/Electrolyte balance, Nutritional status, Respiratory needs, Anemia, and History of heart disease

## 2024-04-15 NOTE — CONSULTS
Department of Urology  Urology Consult Note    Patient:  Dariana Juan  MRN: [id number]  YOB: 1955    Reason for Consult:  urinary retention  Requesting Physician:  Dr. Rodrigez    CHIEF COMPLAINT:    No chief complaint on file.      History Obtained From:   patient, electronic medical record    HISTORY OF PRESENT ILLNESS:    The patient is a 68 y.o. female who is admitted to acute rehab after sustaining a left subtrochanteric femur fracture s/p IM nailing and right femoral stress fracture s/p IM nailing 4/10.  Since her fall and surgeries, she has been struggling with retention. She follows with Dr. Menchaca as outpatient for renal cysts. She reports PVR of up to 1200cc between St. 's admission and acute rehab admission.  She denies any prior episodes of retention or difficulty urinating. She denies any hematuria or dysuria, her UA did show blood, leuks, and bacteria but culture not ordered. She is using oxycodone PRN for pain. Her bowels have been inconsistently moving, she has not had a BM today- bowel regimen ordered. She worked with therapy today.     Past Medical History:        Diagnosis Date    BRCA1 gene mutation positive     BRCA2 gene mutation positive     Cancer (HCC)     melonoma, left arm, melanoma back of neck     Past Surgical History:        Procedure Laterality Date    BLADDER SUSPENSION      prolapse repair    BREAST BIOPSY      three titaanium markers in left breast      SECTION      CHOLECYSTECTOMY      COLONOSCOPY      EMG          FEMUR COMFORT NAIL INSERTION  2024    FEMUR COMFORT NAIL INSERTION Right 04/10/2024    PROPHYLACTIC INTRAMEDULLARY NAIL INSERTION FEMUR    HYSTERECTOMY (CERVIX STATUS UNKNOWN)      has lone ovary    THYROID SURGERY      TIBIA FRACTURE SURGERY Left 2024    FEMORAL INTRAMEDULLARY NAIL INSERTION performed by Patricio Caruso DO at [facility] OR    TIBIA FRACTURE SURGERY Right 4/10/2024    PROPHYLACTIC INTRAMEDULLARY NAIL INSERTION FEMUR  performed by Patricio Caruso DO at [facility] OR     Current Medications:   Current Facility-Administered Medications: oxyCODONE (ROXICODONE) immediate release tablet 5 mg, 5 mg, Oral, Q4H PRN  gabapentin (NEURONTIN) capsule 100 mg, 100 mg, Oral, BID  methocarbamol (ROBAXIN) tablet 500 mg, 500 mg, Oral, TID  acetaminophen (TYLENOL) tablet 1,000 mg, 1,000 mg, Oral, 3 times per day  atorvastatin (LIPITOR) tablet 10 mg, 10 mg, Oral, Nightly  enoxaparin Sodium (LOVENOX) injection 30 mg, 30 mg, SubCUTAneous, BID  insulin lispro (HUMALOG) injection vial 0-4 Units, 0-4 Units, SubCUTAneous, TID WC  levothyroxine (SYNTHROID) tablet 100 mcg, 100 mcg, Oral, Daily  polyethylene glycol (GLYCOLAX) packet 17 g, 17 g, Oral, Daily  [Held by provider] tamsulosin (FLOMAX) capsule 0.4 mg, 0.4 mg, Oral, Daily  senna (SENOKOT) tablet 17.2 mg, 2 tablet, Oral, Daily PRN  bisacodyl (DULCOLAX) suppository 10 mg, 10 mg, Rectal, Daily PRN    Allergies: ALG@    Social History:   Social History     Socioeconomic History    Marital status:      Spouse name: Not on file    Number of children: Not on file    Years of education: Not on file    Highest education level: Not on file   Occupational History    Not on file   Tobacco Use    Smoking status: Never    Smokeless tobacco: Never   Substance and Sexual Activity    Alcohol use: Not on file    Drug use: Not on file    Sexual activity: Not on file   Other Topics Concern    Not on file   Social History Narrative    Not on file     Social Determinants of Health     Financial Resource Strain: Not on file   Food Insecurity: No Food Insecurity (4/13/2024)    Hunger Vital Sign     Worried About Running Out of Food in the Last Year: Never true     Ran Out of Food in the Last Year: Never true   Transportation Needs: No Transportation Needs (4/13/2024)    PRAPARE - Transportation     Lack of Transportation (Medical): No     Lack of Transportation (Non-Medical): No   Physical Activity: Not on file

## 2024-04-15 NOTE — INTERDISCIPLINARY ROUNDS
[facility] Inpatient Rehabilitation  INTERDISCIPLINARY MEETING  Date: 4/15/24  Patient Name: Dariana Juan       Room: 2639/2639-01  MRN: [id number]       : 1955  (68 y.o.)     Gender: female     Patient's care team, including nursing, speech language pathologist, occupational therapist, and/or physical therapist, met to discuss patient's care needs. Any patient concerns, change in medical status, and current transfer/mobility status were identified at this time.     Any Additional Pertinent Information:   Not Applicable    Participating Team Members:  Nurse: Kaylene Phillip RN     Occupational Therapist:  Em Garcia OT   Physical Therapist: Beth Hilton PT  Speech Therapist:  N/A

## 2024-04-15 NOTE — PROGRESS NOTES
Physical Medicine & Rehabilitation  Progress Note    4/15/2024 1:47 PM     CC: Ambulatory and ADL dysfunction due to  left subtrochanteric femur fracture right femoral shaft stress fracture status post IM nailing bilaterally     Subjective:   Feels well.  Pain controlled at rest.  Some increased with activity.  Feels blood pressure is a bit better.    ROS:  Denies fevers, chills, sweats.  No chest pain, palpitations, lightheadedness.  Denies coughing, wheezing or shortness of breath.  Denies abdominal pain, nausea, diarrhea or constipation.  No new areas of joint pain.  Denies new areas of numbness or weakness.  Denies new anxiety or depression issues.  No new skin problems.    Rehabilitation:   PT:    Bed mobility  Bridging:  (unable due to decreased ROM in LEs)  Rolling to Left: Minimal assistance (use of bed rail)  Rolling to Right: Minimal assistance  Supine to Sit: 2 Person assistance (therapist assisting with progression of B LEs to EOB MaxA  then Yudith to progress trunk, HOB elevated with pt using bedrails)  Sit to Supine: Moderate assistance, 2 Person assistance (HOB flat, dependent to scoot up in bed)  Scooting: Maximal assistance (use of)  Bed Mobility Comments: AM tolerance progressed to EOB, then to early mobility chair. vitals and symptoms monitored for tolerance before, during and after movements. While patient sitting up in chair bed position. patient tolerated min therex during sitting activity.    Transfers  Sit to Stand: Dependent/Total  Stand to Sit: Dependent/Total  Bed to Chair: 2 Person Assistance, Dependent/Total (use of fabiola stedy)  Stand Pivot Transfers: Unable to assess  Comment: Pt used Fabiola stedy with bed height raised ~ several inches. Pt able to transfer to EOB, pulling herself up with use of bed rails, pt able to walk feet on and off fabiola stedy and pull herself up to allow paddles to come down. Pt maintained standing position for 1 minute before requiring rest break. Vitals monitored

## 2024-04-15 NOTE — PLAN OF CARE
Problem: Safety - Adult  Goal: Free from fall injury  4/15/2024 1021 by Kaylene Phillip RN  Outcome: Progressing  4/14/2024 2257 by Dary Tejada RN  Outcome: Progressing  Flowsheets (Taken 4/14/2024 2257)  Free From Fall Injury: Instruct family/caregiver on patient safety     Problem: Skin/Tissue Integrity  Goal: Absence of new skin breakdown  Description: 1.  Monitor for areas of redness and/or skin breakdown  2.  Assess vascular access sites hourly  3.  Every 4-6 hours minimum:  Change oxygen saturation probe site  4.  Every 4-6 hours:  If on nasal continuous positive airway pressure, respiratory therapy assess nares and determine need for appliance change or resting period.  4/15/2024 1021 by Kaylene Phillip RN  Outcome: Progressing  4/14/2024 2257 by Dary Tejada RN  Outcome: Progressing     Problem: ABCDS Injury Assessment  Goal: Absence of physical injury  4/15/2024 1021 by Kaylene Phillip RN  Outcome: Progressing  4/14/2024 2257 by Dary Tejada RN  Outcome: Progressing  Flowsheets (Taken 4/14/2024 2257)  Absence of Physical Injury: Implement safety measures based on patient assessment

## 2024-04-15 NOTE — PROGRESS NOTES
Physical Therapy  Facility/Department: [facility] [place] ACUTE REHAB  Rehabilitation Physical Therapy Note    NAME: Dariana Juan  : 1955 (68 y.o.)  MRN: [id number]  CODE STATUS: Full Code    Date of Service: 4/15/24      Past Medical History:   Diagnosis Date    BRCA1 gene mutation positive     BRCA2 gene mutation positive     Cancer (HCC)     melonoma, left arm, melanoma back of neck     Past Surgical History:   Procedure Laterality Date    BLADDER SUSPENSION      prolapse repair    BREAST BIOPSY      three titaanium markers in left breast      SECTION      CHOLECYSTECTOMY      COLONOSCOPY      EMG          FEMUR COMFORT NAIL INSERTION  2024    FEMUR COMFORT NAIL INSERTION Right 04/10/2024    PROPHYLACTIC INTRAMEDULLARY NAIL INSERTION FEMUR    HYSTERECTOMY (CERVIX STATUS UNKNOWN)      has lone ovary    THYROID SURGERY      TIBIA FRACTURE SURGERY Left 2024    FEMORAL INTRAMEDULLARY NAIL INSERTION performed by Patricio Caruso DO at [facility] OR    TIBIA FRACTURE SURGERY Right 4/10/2024    PROPHYLACTIC INTRAMEDULLARY NAIL INSERTION FEMUR performed by Patricio Caruso DO at [facility] OR       Chart Reviewed: Yes  Patient assessed for rehabilitation services?: Yes  Additional Pertinent Hx: recent removal of melnoma L upper arm in March  Family / Caregiver Present: No  Referring Practitioner: Rain  Referral Date : 24  Diagnosis: bilateral femur fxs with ORIF  General Comment  Comments: Pt left in bed with room with bed alarm on, call light within reach    Restrictions:  Restrictions/Precautions: General Precautions;Fall Risk;Weight Bearing (bed alarm, romero placed this am)  Lower Extremity Weight Bearing Restrictions  Right Lower Extremity Weight Bearing: Weight Bearing As Tolerated  Left Lower Extremity Weight Bearing: Weight Bearing As Tolerated  Position Activity Restriction  Other position/activity restrictions: Left subtrochanteric femur fracture s/p IMN, DOS: 24 - Right femoral shaft stress  minutes in fabiola [facility] total. P.M. patient transfered from early mobility to [facility] to bed. Pt able to stand for a minute at a time and able to pull herself up.    Environmental Mobility  Ambulation  Assistance: Unable to assess  Comments: pt too weak to attempt RW and fuerther mobility progression with BP concerns    PT Exercises  Exercise Treatment: quad sets, hamstring sets, glut sets, ankle pumps x10 reps supine  A/AROM Exercises: prolonged stretch using 2 blocks on RLE and 1 block with a towel roll on LLE. R knee AAROM 45 degrees, L knee AAROM 45 degrees. (Increased tightness within L LE with increased pain.)  Static Standing Balance Exercises: fabiola mendez standing 1 minute interval x2    ASSESSMENT  Vitals  Pulse: 90 (EOB/ dangling 2 minutes)  Heart Rate Source: Monitor  SpO2: 98 %  O2 Device: None (Room air)  BP: 112/66  MAP (Calculated): 81  BP Location: Left upper arm  BP Method: Automatic  Orthostatic B/P and Pulse?: Yes  Blood Pressure Lyin/66  Blood Pressure Sittin/61 (110/61, 105/57, 113/69, 115/64)  Blood Pressure Standin/63 (fabiola stedy, 116/57, 121/58)  Comment: Pt transfer process from early mobility chair, to [facility], standing a minute at a time 2 times, to supine in bed    Activity Tolerance  Activity Tolerance: Patient limited by endurance;Patient limited by pain  Activity Tolerance Comments: After treatment, patient experinced increased urge for BM. Increased in BLE pain.            GOALS  Patient Goals   Patient Goals : To return home  Short Term Goals  Time Frame for Short Term Goals: 1week  Short Term Goal 1: Pt to complete bed mobility with min assist using compensatory strategies to manage legs on/off bed with  Short Term Goal 2: Pt tolerate  unsupported sitting for static and dynamic activity ie ADL, anf therex with stable vital signs with SBA  Short Term Goal 3: increase Bilat knee ROM 10-20 deg via positioning and gentle stretching  Short Term Goal 4: tolerate

## 2024-04-15 NOTE — PATIENT CARE CONFERENCE
[facility] Acute Inpatient Rehabilitation  TEAM CONFERENCE NOTE  Date: 24  Patient Name: Dariana Juan       Room: 2639/2639-01  MRN: [id number]       : 1955  (68 y.o.)     Gender: female     Referring Practitioner: Rain     PRINCIPAL DIAGNOSIS: Closed left hip fracture, sequela    NURSING    Bladder Continence  Not Applicable - Device in Use (Indwelling Catheter, Condom Catheter or Ostomy)  Bowel Continence Always Continent    Date of Last BM: 2024    Bladder/Bowel Program Interventions: Both Bowel & Bladder Program In Place     Wounds/Incisions/Ulcers: Incision healing well    Pain Control: Patient's pain currently controlled and pain regimen effective as ordered    Pain Medication Regimen Usage Pattern: MAR reviewed and pain medications are being used at the following frequency (Specify Medication, # Doses Administered on average per day, identified patterns of use - for example: time of day, prior to activity/therapy)  Oxycodone 5mg every 4 hours as needed taking with therapy  Tylenol 1000mg every 8 hours    Fall Risk:  Falling star program initiated    Medication Education Program: Patient able to manage medications and being educated by nursing    Discharge Preparation Patient/Responsible Party Education In Progress:   Fingerstick Glucose Monitoring and Insulin Management, Wound Care, Urinary Catheter Management, and Subcutaneous Injection Education    Nursing specific communication for TEAM: No additional information identified requiring communication at this time    PHYSICAL THERAPY      Bed Mobility:    Bridging:  (unable due to decreased ROM in LEs)  Rolling to Left: Minimal assistance (use of bed rail)  Supine to Sit: 2 Person assistance (therapist assisting with progression of B LEs to EOB MaxA  then Yudith to progress trunk, HOB elevated with pt using bedrails)  Scooting: Maximal assistance (use of)  Bed Mobility Comments: AM tolerance progressed to EOB, then to early    Physical Therapist: Ada Feliciano PT  Speech Therapist:  N/A  Nurse: Martha Zuñiga RN   Dietary/Nutrition: Angelika Rodas RD, LD  Pastoral Care: Chaplain Any Echevarria  CMG: Naomi Dotson RN    I attest to leading the interdisciplinary team meeting, required attendees are present as listed above, I approve the established interdisciplinary plan of care as documented within the medical record of Dariana Juan.    Aaron Rodrigez MD

## 2024-04-16 LAB
CORTIS SERPL-MCNC: 17.2 UG/DL (ref 2.5–19.5)
CORTIS SERPL-MCNC: 28.4 UG/DL (ref 2.5–19.5)
CORTIS SERPL-MCNC: 33.3 UG/DL (ref 2.5–19.5)
GLUCOSE BLD-MCNC: 162 MG/DL (ref 65–105)
GLUCOSE BLD-MCNC: 175 MG/DL (ref 65–105)
GLUCOSE BLD-MCNC: 198 MG/DL (ref 65–105)
GLUCOSE BLD-MCNC: 224 MG/DL (ref 65–105)

## 2024-04-16 PROCEDURE — 97116 GAIT TRAINING THERAPY: CPT

## 2024-04-16 PROCEDURE — 6370000000 HC RX 637 (ALT 250 FOR IP): Performed by: NURSE PRACTITIONER

## 2024-04-16 PROCEDURE — 82024 ASSAY OF ACTH: CPT

## 2024-04-16 PROCEDURE — 99232 SBSQ HOSP IP/OBS MODERATE 35: CPT | Performed by: INTERNAL MEDICINE

## 2024-04-16 PROCEDURE — 97530 THERAPEUTIC ACTIVITIES: CPT

## 2024-04-16 PROCEDURE — 82947 ASSAY GLUCOSE BLOOD QUANT: CPT

## 2024-04-16 PROCEDURE — 6360000002 HC RX W HCPCS: Performed by: NURSE PRACTITIONER

## 2024-04-16 PROCEDURE — 51702 INSERT TEMP BLADDER CATH: CPT

## 2024-04-16 PROCEDURE — 97110 THERAPEUTIC EXERCISES: CPT

## 2024-04-16 PROCEDURE — 36415 COLL VENOUS BLD VENIPUNCTURE: CPT

## 2024-04-16 PROCEDURE — 82533 TOTAL CORTISOL: CPT

## 2024-04-16 PROCEDURE — 6370000000 HC RX 637 (ALT 250 FOR IP): Performed by: PHYSICAL MEDICINE & REHABILITATION

## 2024-04-16 PROCEDURE — 2580000003 HC RX 258: Performed by: INTERNAL MEDICINE

## 2024-04-16 PROCEDURE — 99232 SBSQ HOSP IP/OBS MODERATE 35: CPT | Performed by: PHYSICAL MEDICINE & REHABILITATION

## 2024-04-16 PROCEDURE — 6360000002 HC RX W HCPCS: Performed by: INTERNAL MEDICINE

## 2024-04-16 PROCEDURE — 97535 SELF CARE MNGMENT TRAINING: CPT

## 2024-04-16 PROCEDURE — 1180000000 HC REHAB R&B

## 2024-04-16 PROCEDURE — 6370000000 HC RX 637 (ALT 250 FOR IP): Performed by: INTERNAL MEDICINE

## 2024-04-16 RX ADMIN — ATORVASTATIN CALCIUM 10 MG: 10 TABLET, FILM COATED ORAL at 19:55

## 2024-04-16 RX ADMIN — METHOCARBAMOL 500 MG: 500 TABLET ORAL at 14:37

## 2024-04-16 RX ADMIN — POLYETHYLENE GLYCOL 3350 17 G: 17 POWDER, FOR SOLUTION ORAL at 08:23

## 2024-04-16 RX ADMIN — INSULIN LISPRO 1 UNITS: 100 INJECTION, SOLUTION INTRAVENOUS; SUBCUTANEOUS at 12:29

## 2024-04-16 RX ADMIN — CEFTRIAXONE SODIUM 1000 MG: 1 INJECTION, POWDER, FOR SOLUTION INTRAMUSCULAR; INTRAVENOUS at 19:43

## 2024-04-16 RX ADMIN — METFORMIN HYDROCHLORIDE 500 MG: 500 TABLET ORAL at 19:56

## 2024-04-16 RX ADMIN — OXYCODONE HYDROCHLORIDE 5 MG: 5 TABLET ORAL at 12:29

## 2024-04-16 RX ADMIN — GABAPENTIN 100 MG: 100 CAPSULE ORAL at 08:22

## 2024-04-16 RX ADMIN — ENOXAPARIN SODIUM 30 MG: 100 INJECTION SUBCUTANEOUS at 21:22

## 2024-04-16 RX ADMIN — METHOCARBAMOL 500 MG: 500 TABLET ORAL at 19:55

## 2024-04-16 RX ADMIN — ACETAMINOPHEN 1000 MG: 500 TABLET ORAL at 06:26

## 2024-04-16 RX ADMIN — ACETAMINOPHEN 1000 MG: 500 TABLET ORAL at 14:37

## 2024-04-16 RX ADMIN — COSYNTROPIN 250 MCG: 0.25 INJECTION, POWDER, LYOPHILIZED, FOR SOLUTION INTRAMUSCULAR; INTRAVENOUS at 08:27

## 2024-04-16 RX ADMIN — OXYCODONE HYDROCHLORIDE 5 MG: 5 TABLET ORAL at 08:37

## 2024-04-16 RX ADMIN — ENOXAPARIN SODIUM 30 MG: 100 INJECTION SUBCUTANEOUS at 08:20

## 2024-04-16 RX ADMIN — GABAPENTIN 100 MG: 100 CAPSULE ORAL at 19:55

## 2024-04-16 RX ADMIN — ACETAMINOPHEN 1000 MG: 500 TABLET ORAL at 21:22

## 2024-04-16 RX ADMIN — METHOCARBAMOL 500 MG: 500 TABLET ORAL at 08:20

## 2024-04-16 RX ADMIN — LEVOTHYROXINE SODIUM 100 MCG: 0.1 TABLET ORAL at 06:26

## 2024-04-16 ASSESSMENT — PAIN DESCRIPTION - DESCRIPTORS
DESCRIPTORS: ACHING;JABBING
DESCRIPTORS: NAGGING
DESCRIPTORS: ACHING
DESCRIPTORS: NAGGING
DESCRIPTORS: ACHING
DESCRIPTORS: ACHING;DULL

## 2024-04-16 ASSESSMENT — PAIN DESCRIPTION - LOCATION
LOCATION: LEG
LOCATION: HIP;LEG
LOCATION: HIP
LOCATION: LEG
LOCATION: HIP;LEG
LOCATION: LEG
LOCATION: LEG
LOCATION: HIP;LEG
LOCATION: HIP

## 2024-04-16 ASSESSMENT — PAIN DESCRIPTION - ORIENTATION
ORIENTATION: LEFT;RIGHT
ORIENTATION: RIGHT;LEFT
ORIENTATION: LEFT;RIGHT
ORIENTATION: RIGHT;LEFT
ORIENTATION: LEFT
ORIENTATION: LEFT;RIGHT
ORIENTATION: RIGHT;LEFT
ORIENTATION: LEFT
ORIENTATION: RIGHT;LEFT

## 2024-04-16 ASSESSMENT — PAIN - FUNCTIONAL ASSESSMENT
PAIN_FUNCTIONAL_ASSESSMENT: PREVENTS OR INTERFERES SOME ACTIVE ACTIVITIES AND ADLS
PAIN_FUNCTIONAL_ASSESSMENT: ACTIVITIES ARE NOT PREVENTED
PAIN_FUNCTIONAL_ASSESSMENT: PREVENTS OR INTERFERES SOME ACTIVE ACTIVITIES AND ADLS
PAIN_FUNCTIONAL_ASSESSMENT: ACTIVITIES ARE NOT PREVENTED

## 2024-04-16 ASSESSMENT — PAIN DESCRIPTION - PAIN TYPE: TYPE: ACUTE PAIN;SURGICAL PAIN

## 2024-04-16 ASSESSMENT — PAIN SCALES - GENERAL
PAINLEVEL_OUTOF10: 2
PAINLEVEL_OUTOF10: 3
PAINLEVEL_OUTOF10: 2
PAINLEVEL_OUTOF10: 2
PAINLEVEL_OUTOF10: 1
PAINLEVEL_OUTOF10: 5
PAINLEVEL_OUTOF10: 2
PAINLEVEL_OUTOF10: 2
PAINLEVEL_OUTOF10: 0
PAINLEVEL_OUTOF10: 2
PAINLEVEL_OUTOF10: 2
PAINLEVEL_OUTOF10: 6
PAINLEVEL_OUTOF10: 0
PAINLEVEL_OUTOF10: 2

## 2024-04-16 ASSESSMENT — PAIN SCALES - WONG BAKER: WONGBAKER_NUMERICALRESPONSE: NO HURT

## 2024-04-16 NOTE — PLAN OF CARE
Problem: Discharge Planning  Goal: Discharge to home or other facility with appropriate resources  4/16/2024 1120 by Angelique Nolan RN  Outcome: Progressing  4/15/2024 2348 by Lorna Donald LPN  Outcome: Progressing     Problem: Safety - Adult  Goal: Free from fall injury  4/16/2024 1120 by Angelique Nolan RN  Outcome: Progressing  4/15/2024 2348 by Lorna Donald LPN  Outcome: Progressing     Problem: Skin/Tissue Integrity  Goal: Absence of new skin breakdown  Description: 1.  Monitor for areas of redness and/or skin breakdown  2.  Assess vascular access sites hourly  3.  Every 4-6 hours minimum:  Change oxygen saturation probe site  4.  Every 4-6 hours:  If on nasal continuous positive airway pressure, respiratory therapy assess nares and determine need for appliance change or resting period.  4/16/2024 1120 by Angelique Nolan RN  Outcome: Progressing  4/15/2024 2348 by Lorna Donald LPN  Outcome: Progressing     Problem: ABCDS Injury Assessment  Goal: Absence of physical injury  4/16/2024 1120 by Angelique Nolan RN  Outcome: Progressing  4/15/2024 2348 by Lorna Donald LPN  Outcome: Progressing     Problem: Pain  Goal: Verbalizes/displays adequate comfort level or baseline comfort level  4/16/2024 1120 by Angelique Nolan RN  Outcome: Progressing  4/15/2024 2348 by Lorna Donald LPN  Outcome: Progressing     Problem: Nutrition Deficit:  Goal: Optimize nutritional status  4/16/2024 1120 by Angelique Nolan RN  Outcome: Progressing  4/15/2024 2348 by Lorna Donald LPN  Outcome: Progressing

## 2024-04-16 NOTE — DISCHARGE SUMMARY
DISCHARGE SUMMARY:    PATIENT NAME:  Dariana Juan  YOB: 1955  MEDICAL RECORD NO. [id number]  DATE: 04/16/24  PRIMARY CARE PHYSICIAN: Kathryn Ortiz APRN - CNP  ADMIT DATE: 4/7/2024   DISCHARGE DATE: 4/13/2024   DISPOSITION:  Physical medicine and rehabilitation at [facility]  DISCHARGE DATE:    ADMITTING DIAGNOSIS:   Left displaced femoral neck fracture    DIAGNOSIS:   Patient Active Problem List   Diagnosis    Left displaced femoral neck fracture (HCC)    Closed fracture of left femur (HCC)    Stress fracture of right femur    Closed left hip fracture, sequela    Orthostatic hypotension       CONSULTANTS:  orthopedic surgery    PROCEDURES:   Left femur fracture s/p l IMN 4/8/2024  R femur stress fracture s/p IMN 4/10    HOSPITAL COURSE:   Dariana Juan is a 68 y.o. female who was admitted on 4/7/2024 with left displaced femoral neck fracture    Labs and imaging were followed daily.      At time of discharge, Dariana Juan was tolerating a regular diet, having bowel movements, nonambulatory due to femur fracture, had adequate analgesia on oral pain medications, and had no signs of symptoms of complications.  She was deemed medically stable and discharged to [facility] on 4/13/2024 with instructions to follow-up with Ortho on 4/25.  Pt expressed understanding of and agreement with DC plans.          PHYSICAL EXAMINATION:        Discharge Vitals:  height is 1.727 m (5' 8\") and weight is 66.7 kg (147 lb 0.8 oz). Her oral temperature is 98.1 °F (36.7 °C). Her blood pressure is 130/78 and her pulse is 98. Her respiration is 17 and oxygen saturation is 100%.   General appearance - alert, well appearing, and in no distress  Chest - clear to ausculation  Heart - normal rate and regular rhythm  Abdomen - soft, non tender, non distended, bowel sounds present  Neurological - motor and sensory grossly normal bilaterally  Musculoskeletal -full ROM

## 2024-04-16 NOTE — PROGRESS NOTES
Physical Medicine & Rehabilitation  Progress Note    4/16/2024 4:50 PM     CC: Ambulatory and ADL dysfunction due to  left subtrochanteric femur fracture right femoral shaft stress fracture status post IM nailing bilaterally     Subjective:   Feels well.  Pain controlled at rest.  Continues with hypotension with seated or standing attempt    ROS:  Denies fevers, chills, sweats.  No chest pain, palpitations, lightheadedness.  Denies coughing, wheezing or shortness of breath.  Denies abdominal pain, nausea, diarrhea or constipation.  No new areas of joint pain.  Denies new areas of numbness or weakness.  Denies new anxiety or depression issues.  No new skin problems.    Rehabilitation:   PT:    Bed mobility  Bridging: Dependent/Total (use of mechanical bed)  Rolling to Left: Dependent/Total (use of mechanical bed)  Rolling to Right: Minimal assistance  Supine to Sit: 2 Person assistance (pt able to walk legs over to EOB, then assistance of therapist to scoot hips forward. Pt used a push up maneuver to move hips forward.)  Sit to Supine: Moderate assistance, 2 Person assistance (HOB flat, dependent to scoot up in bed)  Scooting: Maximal assistance (use of glider sheet and patient performed push up maneuver to scoot hips forward.)  Bed Mobility Comments: AM tolerance progressed to EOB, use of fabiola stedy, then to early mobility chair. vitals and symptoms monitored for tolerance before, during and after movements. While patient sitting up in chair bed position. patient tolerated min therex during sitting activity. PM patient able to use scooting and hip walking to guide hips on early mobility chair and in bed. While in early mobilty chair patient tolerated min ther ex and ther act.    Transfers  Sit to Stand: Dependent/Total (fabiola stedy)  Stand to Sit: Dependent/Total (fabiola stedy)  Bed to Chair: 2 Person Assistance, Dependent/Total (use of fabiola stedy)  Stand Pivot Transfers: Unable to assess  Comment: Pt used Fabiola stedy     levothyroxine  100 mcg Oral Daily    polyethylene glycol  17 g Oral Daily    [Held by provider] tamsulosin  0.4 mg Oral Daily     Continuous Infusions:  PRN Meds:.oxyCODONE, senna, bisacodyl     Diagnostics:     CBC:   Recent Labs     04/14/24  0654   WBC 10.1   RBC 2.99*   HGB 9.1*   HCT 27.3*   MCV 91.3   RDW 14.6          BMP:   Recent Labs     04/14/24  0654   *   K 4.4   CL 99   CO2 25   BUN 21   CREATININE 0.7       BNP: No results for input(s): \"BNP\" in the last 72 hours.  PT/INR: No results for input(s): \"PROTIME\", \"INR\" in the last 72 hours.  APTT: No results for input(s): \"APTT\" in the last 72 hours.  CARDIAC ENZYMES: No results for input(s): \"CKMB\", \"CKMBINDEX\", \"TROPONINT\" in the last 72 hours.    Invalid input(s): \"CKTOTAL;3\"  FASTING LIPID PANEL:No results found for: \"CHOL\", \"HDL\", \"TRIG\"  LIVER PROFILE:   Recent Labs     04/14/24  0654   AST 23   ALT 43*   BILIDIR 0.2   BILITOT 1.0   ALKPHOS 128*          I/O (24Hr):    Intake/Output Summary (Last 24 hours) at 4/16/2024 1650  Last data filed at 4/16/2024 0635  Gross per 24 hour   Intake --   Output 2100 ml   Net -2100 ml         Glu last 24 hour  Recent Labs     04/15/24  2111 04/16/24  0629 04/16/24  1125 04/16/24  1628   POCGLU 174* 175* 224* 162*         Recent Labs     04/14/24  1130   COLORU Yellow   PHUR 5.0   SPECGRAV 1.021   PROTEINU NEGATIVE   RBCUA 6 TO 9*   BACTERIA FEW*   NITRU NEGATIVE   WBCUA 3 to 5*   LEUKOCYTESUR TRACE*   GLUCOSEU NEGATIVE   BILIRUBINUR NEGATIVE           Impression/Plan:    Ambulatory and ADL dysfunction due to left subtrochanteric femur fracture right femoral shaft stress fracture status post IM nailing bilaterally-weightbearing as tolerated-continue rehab efforts limited due to hypotension with sitting  Left subtrochanteric femur fracture and right femoral shaft stress fracture status post IM nailing weightbearing as tolerated monitor incisions appear clean follow-up Dr. Foley 4/25 continue

## 2024-04-16 NOTE — PROGRESS NOTES
Physical Therapy  Facility/Department: [facility] ACUTE REHAB  Rehabilitation Physical Therapy Note    NAME: Dariana Juan  : 1955 (68 y.o.)  MRN: [id number]  CODE STATUS: Full Code    Date of Service: 24      Past Medical History:   Diagnosis Date    BRCA1 gene mutation positive     BRCA2 gene mutation positive     Cancer (HCC)     melonoma, left arm, melanoma back of neck     Past Surgical History:   Procedure Laterality Date    BLADDER SUSPENSION      prolapse repair    BREAST BIOPSY      three titaanium markers in left breast      SECTION      CHOLECYSTECTOMY      COLONOSCOPY      EMG          FEMUR COMFORT NAIL INSERTION  2024    FEMUR COMFORT NAIL INSERTION Right 04/10/2024    PROPHYLACTIC INTRAMEDULLARY NAIL INSERTION FEMUR    HYSTERECTOMY (CERVIX STATUS UNKNOWN)      has lone ovary    THYROID SURGERY      TIBIA FRACTURE SURGERY Left 2024    FEMORAL INTRAMEDULLARY NAIL INSERTION performed by Patircio Caruso DO at [facility] OR    TIBIA FRACTURE SURGERY Right 4/10/2024    PROPHYLACTIC INTRAMEDULLARY NAIL INSERTION FEMUR performed by Patricio Caruso DO at [facility] OR       Chart Reviewed: Yes  Patient assessed for rehabilitation services?: Yes  Additional Pertinent Hx: recent removal of melnoma L upper arm in March  Family / Caregiver Present: No  Referring Practitioner: Rain  Referral Date : 24  Diagnosis: bilateral femur fxs with ORIF  General Comment  Comments: Pt left in bed with room with bed alarm on, call light within reach    Restrictions:  Restrictions/Precautions: General Precautions;Fall Risk;Weight Bearing (bed alarm, romero placed this am)  Position Activity Restriction  Other position/activity restrictions: Left subtrochanteric femur fracture s/p IMN, DOS: 24 - Right femoral shaft stress fracture s/p IMN, DOS: 4/10/24     SUBJECTIVE  Subjective: A.M. Upon arrival pt presented in bed and agreeable to PT session. Pt reports she slept well and 2/10 pain while in bed.

## 2024-04-16 NOTE — PROGRESS NOTES
[facility]   Acute Rehabilitation Occupational Therapy Daily Treatment Note    Date: 24  Patient Name: Dariaan Juan       Room: 2639/2639-01  MRN: [id number]  Account: [id number]   : 1955  (68 y.o.) Gender: female       Referring Practitioner: Aaron Rodrigez MD  Diagnosis: Left subtrochanteric femur fracture and right femoral stress fracture status post IM nailing-felt to be related biphosphonate  Additional Pertinent Hx: Ms. Dariana Juan is a 68 y.o. female who was admitted to [facility] on 2024 with Fall and Dislocation. 68-year-old female admitted [facility] for left thigh pain and deformity-noted playing hockey with her grandson and fell on her left leg she had immediate pain found to have left femur fracture and baseline lives with her spouse is independent spouse has cancer and is unable provide much assistance.  Patient takes biphosphonate's for osteoporosis and neuropathy of unknown origin. Ortho-left subtrochanteric femur fracture status post IMN and right femoral shaft stress fracture plan to go to the OR/10/24 for treatment right femoral shaft stress fracture weightbearing as tolerated left lower extremity nonweightbearing right lower extremity. Felt to be biphosphonate related fracture. Trauma-as above. Pt admitted to ARU on 2024.    Treatment Diagnosis: Impaired self-care status    Past Medical History:  has a past medical history of BRCA1 gene mutation positive, BRCA2 gene mutation positive, and Cancer (HCC).    Past Surgical History:   has a past surgical history that includes Breast biopsy;  section; Hysterectomy; Thyroid surgery; Cholecystectomy; Colonoscopy; bladder suspension; EMG; Femur Sarah nail insertion (2024); Tibia fracture surgery (Left, 2024); Femur Sarah nail insertion (Right, 04/10/2024); and Tibia fracture surgery (Right, 4/10/2024).    Restrictions  Restrictions/Precautions  Restrictions/Precautions:  progress;Home with assist PRN    Patient Education  Education  Education Given To: Patient  Education Provided: Role of Therapy;Plan of Care;Safety;Precautions;ADL Function;Equipment  Education Method: Verbal;Demonstration  Barriers to Learning: None  Education Outcome: Verbalized understanding;Continued education needed    OT Equipment Recommendations  Other: TBD    Safety Devices  Safety Devices in place: Yes  Type of devices: All fall risk precautions in place;Call light within reach;Gait belt;Patient at risk for falls;Left in bed;Nurse notified       Goals  Patient Goals   Patient goals : \"to be able to wash my hair and wash myself\" pt states in regard to ARU goal  Short Term Goals  Time Frame for Short Term Goals: 7-10 days  Short Term Goal 1: Pt will perform UB bathing/dressing set-up with Good safety  Short Term Goal 2: Pt will perform LB bathing/dressing Mod A with Good safety and use of AE/modified techniques as needed  Short Term Goal 3: Pt will perform functional transfers Mod A during self-care tasks with Good safety and use of least restrictive device  Short Term Goal 4: Pt will tolerate sitting EOB 10+ minutes during functional activity of choice SBA to improve dynamic balance and reaching outside JUAN for participation in self-care tasks  Short Term Goal 5: Pt will verbalize/demonstrate Good understanding of AE/DME/modified techniques to promote ease and independence with self-care tasks  Short Term Goal 6: Pt will verbalize/demonstrate Good understanding of 2-3 non-pharmaceutical pain management strategies to increase participation in self-care tasks and to improve quality of life  Short Term Goal 7: Pt will verbalize/demonstrate Good understanding of EC/WS strategies to increase participation and safety during daily activities  Short Term Goal 8: Pt will actively participate in 30+ minutes of therapeutic exercise/functional activity/social participation to promote safety and independence with

## 2024-04-16 NOTE — PROGRESS NOTES
Diarrhea    Provider, Janis, MD        Allergies:     Iodinated contrast media and Penicillins    Social History:     Tobacco:    reports that she has never smoked. She has never used smokeless tobacco.  Alcohol:      has no history on file for alcohol use.  Drug Use:  has no history on file for drug use.    Family History:     Family History   Problem Relation Age of Onset    Breast Cancer Mother 64    Breast Cancer Maternal Grandmother 70    Breast Cancer Paternal Grandmother 70       Review of Systems:     Positive and Negative as described in HPI.    CONSTITUTIONAL:  negative for fevers, chills, sweats, fatigue, weight loss  HEENT:  negative for vision, hearing changes, runny nose, throat pain  RESPIRATORY:  negative for shortness of breath, cough, congestion, wheezing.  CARDIOVASCULAR:  negative for chest pain, palpitations.  GASTROINTESTINAL:  negative for nausea, vomiting, diarrhea, constipation, change in bowel habits, abdominal pain   GENITOURINARY:  negative for difficulty of urination, burning with urination, frequency   INTEGUMENT:  negative for rash, skin lesions, easy bruising   HEMATOLOGIC/LYMPHATIC:  negative for swelling/edema   ALLERGIC/IMMUNOLOGIC:  negative for urticaria , itching  ENDOCRINE:  negative increase in drinking, increase in urination, hot or cold intolerance  MUSCULOSKELETAL: Pain  NEUROLOGICAL:  negative for headaches, dizziness, lightheadedness, numbness, pain, tingling extremities  BEHAVIOR/PSYCH:  negative for depression, anxiety    Physical Exam:   /66   Pulse 73   Temp 98.1 °F (36.7 °C)   Resp 18   Ht 1.727 m (5' 7.99\")   Wt 62.1 kg (136 lb 12.8 oz)   SpO2 98%   BMI 20.81 kg/m²   Temp (24hrs), Av °F (36.7 °C), Min:97.9 °F (36.6 °C), Max:98.1 °F (36.7 °C)    Recent Labs     04/15/24  2111 24  0629 24  1125 24  1628   POCGLU 174* 175* 224* 162*         Intake/Output Summary (Last 24 hours) at 2024 191  Last data filed at 2024  J, APRN - CNP    Please note that this chart was generated using voice recognition Dragon dictation software.  Although every effort was made to ensure the accuracy of this automated transcription, some errors in transcription may have occurred.

## 2024-04-16 NOTE — PLAN OF CARE
Problem: Discharge Planning  Goal: Discharge to home or other facility with appropriate resources  4/15/2024 2348 by Lorna Donald LPN  Outcome: Progressing     Problem: Safety - Adult  Goal: Free from fall injury  4/15/2024 2348 by Lorna Donald LPN  Outcome: Progressing     Problem: Skin/Tissue Integrity  Goal: Absence of new skin breakdown  Description: 1.  Monitor for areas of redness and/or skin breakdown  2.  Assess vascular access sites hourly  3.  Every 4-6 hours minimum:  Change oxygen saturation probe site  4.  Every 4-6 hours:  If on nasal continuous positive airway pressure, respiratory therapy assess nares and determine need for appliance change or resting period.  4/15/2024 2348 by Lorna Donald LPN  Outcome: Progressing     Problem: ABCDS Injury Assessment  Goal: Absence of physical injury  4/15/2024 2348 by Lorna Donald LPN  Outcome: Progressing     Problem: Pain  Goal: Verbalizes/displays adequate comfort level or baseline comfort level  4/15/2024 2348 by Lorna Donald LPN  Outcome: Progressing     Problem: Nutrition Deficit:  Goal: Optimize nutritional status  4/15/2024 2348 by Lorna Donald LPN  Outcome: Progressing

## 2024-04-17 LAB
ACTH PLAS-MCNC: 19 PG/ML (ref 7–63)
ALK PHOS BONE SPECIFIC: 27 U/L (ref 0–55)
ALK PHOS OTHER CALC: 0 U/L
ALK PHOSPHATASE: 128 U/L (ref 40–120)
ALKALINE PHOSPHATASE LIVER FRACTION: 101 U/L (ref 0–94)
GLUCOSE BLD-MCNC: 161 MG/DL (ref 65–105)
GLUCOSE BLD-MCNC: 175 MG/DL (ref 65–105)
GLUCOSE BLD-MCNC: 181 MG/DL (ref 65–105)
GLUCOSE BLD-MCNC: 195 MG/DL (ref 65–105)
MICROORGANISM SPEC CULT: ABNORMAL
SPECIMEN DESCRIPTION: ABNORMAL

## 2024-04-17 PROCEDURE — 97535 SELF CARE MNGMENT TRAINING: CPT

## 2024-04-17 PROCEDURE — 97530 THERAPEUTIC ACTIVITIES: CPT

## 2024-04-17 PROCEDURE — 97110 THERAPEUTIC EXERCISES: CPT

## 2024-04-17 PROCEDURE — 97116 GAIT TRAINING THERAPY: CPT

## 2024-04-17 PROCEDURE — 6370000000 HC RX 637 (ALT 250 FOR IP): Performed by: NURSE PRACTITIONER

## 2024-04-17 PROCEDURE — 1180000000 HC REHAB R&B

## 2024-04-17 PROCEDURE — 2580000003 HC RX 258: Performed by: INTERNAL MEDICINE

## 2024-04-17 PROCEDURE — 82947 ASSAY GLUCOSE BLOOD QUANT: CPT

## 2024-04-17 PROCEDURE — 99232 SBSQ HOSP IP/OBS MODERATE 35: CPT | Performed by: PHYSICAL MEDICINE & REHABILITATION

## 2024-04-17 PROCEDURE — 6370000000 HC RX 637 (ALT 250 FOR IP): Performed by: PHYSICAL MEDICINE & REHABILITATION

## 2024-04-17 PROCEDURE — 6360000002 HC RX W HCPCS: Performed by: INTERNAL MEDICINE

## 2024-04-17 PROCEDURE — 99232 SBSQ HOSP IP/OBS MODERATE 35: CPT | Performed by: INTERNAL MEDICINE

## 2024-04-17 PROCEDURE — 6360000002 HC RX W HCPCS: Performed by: NURSE PRACTITIONER

## 2024-04-17 PROCEDURE — 6370000000 HC RX 637 (ALT 250 FOR IP): Performed by: INTERNAL MEDICINE

## 2024-04-17 RX ADMIN — ENOXAPARIN SODIUM 30 MG: 100 INJECTION SUBCUTANEOUS at 07:34

## 2024-04-17 RX ADMIN — ACETAMINOPHEN 1000 MG: 500 TABLET ORAL at 20:52

## 2024-04-17 RX ADMIN — METHOCARBAMOL 500 MG: 500 TABLET ORAL at 07:34

## 2024-04-17 RX ADMIN — CEFTRIAXONE SODIUM 1000 MG: 1 INJECTION, POWDER, FOR SOLUTION INTRAMUSCULAR; INTRAVENOUS at 16:33

## 2024-04-17 RX ADMIN — ACETAMINOPHEN 1000 MG: 500 TABLET ORAL at 15:16

## 2024-04-17 RX ADMIN — LEVOTHYROXINE SODIUM 100 MCG: 0.1 TABLET ORAL at 05:49

## 2024-04-17 RX ADMIN — OXYCODONE HYDROCHLORIDE 5 MG: 5 TABLET ORAL at 07:34

## 2024-04-17 RX ADMIN — GABAPENTIN 100 MG: 100 CAPSULE ORAL at 07:34

## 2024-04-17 RX ADMIN — INSULIN LISPRO 2 UNITS: 100 INJECTION, SOLUTION INTRAVENOUS; SUBCUTANEOUS at 16:30

## 2024-04-17 RX ADMIN — METHOCARBAMOL 500 MG: 500 TABLET ORAL at 20:52

## 2024-04-17 RX ADMIN — ATORVASTATIN CALCIUM 10 MG: 10 TABLET, FILM COATED ORAL at 20:52

## 2024-04-17 RX ADMIN — METHOCARBAMOL 500 MG: 500 TABLET ORAL at 15:16

## 2024-04-17 RX ADMIN — METFORMIN HYDROCHLORIDE 500 MG: 500 TABLET ORAL at 16:30

## 2024-04-17 RX ADMIN — METFORMIN HYDROCHLORIDE 500 MG: 500 TABLET ORAL at 07:34

## 2024-04-17 RX ADMIN — OXYCODONE HYDROCHLORIDE 5 MG: 5 TABLET ORAL at 12:50

## 2024-04-17 RX ADMIN — GABAPENTIN 100 MG: 100 CAPSULE ORAL at 20:52

## 2024-04-17 RX ADMIN — ACETAMINOPHEN 1000 MG: 500 TABLET ORAL at 05:49

## 2024-04-17 RX ADMIN — ENOXAPARIN SODIUM 30 MG: 100 INJECTION SUBCUTANEOUS at 20:52

## 2024-04-17 ASSESSMENT — PAIN DESCRIPTION - PAIN TYPE
TYPE: ACUTE PAIN;SURGICAL PAIN
TYPE: ACUTE PAIN;SURGICAL PAIN
TYPE: ACUTE PAIN
TYPE: ACUTE PAIN
TYPE: ACUTE PAIN;SURGICAL PAIN

## 2024-04-17 ASSESSMENT — PAIN - FUNCTIONAL ASSESSMENT
PAIN_FUNCTIONAL_ASSESSMENT: ACTIVITIES ARE NOT PREVENTED
PAIN_FUNCTIONAL_ASSESSMENT: PREVENTS OR INTERFERES SOME ACTIVE ACTIVITIES AND ADLS

## 2024-04-17 ASSESSMENT — PAIN SCALES - GENERAL
PAINLEVEL_OUTOF10: 4
PAINLEVEL_OUTOF10: 2
PAINLEVEL_OUTOF10: 4
PAINLEVEL_OUTOF10: 2
PAINLEVEL_OUTOF10: 1
PAINLEVEL_OUTOF10: 2
PAINLEVEL_OUTOF10: 4

## 2024-04-17 ASSESSMENT — PAIN DESCRIPTION - FREQUENCY
FREQUENCY: CONTINUOUS

## 2024-04-17 ASSESSMENT — PAIN DESCRIPTION - LOCATION
LOCATION: LEG

## 2024-04-17 ASSESSMENT — PAIN DESCRIPTION - ONSET
ONSET: ON-GOING

## 2024-04-17 ASSESSMENT — PAIN DESCRIPTION - ORIENTATION
ORIENTATION: LEFT;RIGHT
ORIENTATION: LEFT;RIGHT
ORIENTATION: RIGHT;LEFT
ORIENTATION: LEFT;RIGHT
ORIENTATION: RIGHT;LEFT

## 2024-04-17 ASSESSMENT — PAIN DESCRIPTION - DESCRIPTORS
DESCRIPTORS: ACHING

## 2024-04-17 NOTE — PLAN OF CARE
Problem: Discharge Planning  Goal: Discharge to home or other facility with appropriate resources  4/17/2024 0016 by Lorna Donald LPN  Outcome: Progressing     Problem: Safety - Adult  Goal: Free from fall injury  4/17/2024 0016 by Lorna Donald LPN  Outcome: Progressing     Problem: Skin/Tissue Integrity  Goal: Absence of new skin breakdown  Description: 1.  Monitor for areas of redness and/or skin breakdown  2.  Assess vascular access sites hourly  3.  Every 4-6 hours minimum:  Change oxygen saturation probe site  4.  Every 4-6 hours:  If on nasal continuous positive airway pressure, respiratory therapy assess nares and determine need for appliance change or resting period.  4/17/2024 0016 by Lorna Donald LPN  Outcome: Progressing     Problem: ABCDS Injury Assessment  Goal: Absence of physical injury  4/17/2024 0016 by Lorna Donald LPN  Outcome: Progressing     Problem: Pain  Goal: Verbalizes/displays adequate comfort level or baseline comfort level  4/17/2024 0016 by Lorna Donald LPN  Outcome: Progressing     Problem: Nutrition Deficit:  Goal: Optimize nutritional status  4/17/2024 0016 by Lorna Donald LPN  Outcome: Progressing

## 2024-04-17 NOTE — PROGRESS NOTES
assistance;Requires x 2 assistance  Skilled Clinical Factors: MOD A x2 sit<>stand once in stance pt CGA x2 max time req for completion                Functional Mobility  Device: Rolling walker  Activity:  (~3-4 feet x2)  Assistance Level: Requires x 2 assistance;Contact guard assist  Skilled Clinical Factors: Ax2 for pt safety; max time req for completion; PM: pt engagement in w/c mobility down adams addressing endurance and strength needed for participation in functional transfers- increased time req to reach desired designation            OT Exercises  Static Standing Balance Exercises: facilitated pts engagement in static standing activity while standing using RW  addressing balance, posture and activity tolerance for increased indep. Pt able to tolerate standing for 6-7 mins x2 with prolonged RB d/t pain and fatigue. Pt req MOD Ax 2 sit<>stand and once in stance CGA x1. Pt initally demo's shoulder hiking req cues to correct- no LOB                   Assessment  Assessment  Activity Tolerance: Patient limited by endurance;Patient limited by pain;Treatment limited secondary to medical complications;Patient limited by fatigue  Discharge Recommendations: Continue to assess pending progress;Home with assist PRN    Patient Education  Education  Education Given To: Patient  Education Provided: Role of Therapy;Plan of Care;Safety;Precautions;ADL Function;Equipment  Education Method: Verbal;Demonstration  Barriers to Learning: None  Education Outcome: Verbalized understanding;Continued education needed    OT Equipment Recommendations  Other: TBD    Safety Devices  Safety Devices in place: Yes  Type of devices: All fall risk precautions in place;Call light within reach;Gait belt;Patient at risk for falls;Left in bed;Nurse notified       Goals  Patient Goals   Patient goals : \"to be able to wash my hair and wash myself\" pt states in regard to ARU goal  Short Term Goals  Time Frame for Short Term Goals: 7-10 days  Short

## 2024-04-17 NOTE — PROGRESS NOTES
[facility]   IN-PATIENT SERVICE   [facility]    Progress note            Date:   2024  Patient name:  Dariana Juan  Date of admission:  2024  2:37 PM  MRN:   [id number]  Account:  [id number]  YOB: 1955  PCP:    Kathryn Ortiz APRN - CNP  Room:   [address] [place] [address]  Code Status:    Full Code    Chief Complaint:     Rehab    History Obtained From:     patient    History of Present Illness:     The patient is a 68 y.o.  Non- / non  female who presents with No chief complaint on file.   and she is admitted to the rehab facility for therapy.    68-year-old female with history of osteoporosis, has been on bisphosphonates for several years now, with recommendations to switch to Prolia as per her primary physician given poor response to bisphosphonates, initially presented to outside hospital with left-sided femur fracture.  This was while she was playing hockey with her grandson.  While there she was also noted to have a right sided stress fracture.    Her past medical history significant for melanoma, BRCA gene mutation.    2024  Orthostatics were checked today, were negative however the patient reports that while in therapy her blood pressures did drop.  Otherwise reports feeling well.      Past Medical History:     Past Medical History:   Diagnosis Date    BRCA1 gene mutation positive     BRCA2 gene mutation positive     Cancer (HCC)     melonoma, left arm, melanoma back of neck        Past Surgical History:     Past Surgical History:   Procedure Laterality Date    BLADDER SUSPENSION      prolapse repair    BREAST BIOPSY      three titaanium markers in left breast      SECTION      CHOLECYSTECTOMY      COLONOSCOPY      EMG          FEMUR COMFORT NAIL INSERTION  2024    FEMUR COMFORT NAIL INSERTION Right 04/10/2024    PROPHYLACTIC INTRAMEDULLARY NAIL INSERTION FEMUR    HYSTERECTOMY (CERVIX STATUS UNKNOWN)      has lone ovary

## 2024-04-17 NOTE — PROGRESS NOTES
Physical Medicine & Rehabilitation  Progress Note    4/17/2024 1:22 PM     CC: Ambulatory and ADL dysfunction due to  left subtrochanteric femur fracture right femoral shaft stress fracture status post IM nailing bilaterally     Subjective:   Feels well.  Pain controlled at rest.  Continues with hypotension with seated or standing attempt though slightly improving    ROS:  Denies fevers, chills, sweats.  No chest pain, palpitations, lightheadedness.  Denies coughing, wheezing or shortness of breath.  Denies abdominal pain, nausea, diarrhea or constipation.  No new areas of joint pain.  Denies new areas of numbness or weakness.  Denies new anxiety or depression issues.  No new skin problems.    Rehabilitation:   PT:    Bed mobility  Bridging: Dependent/Total (use of mechanical bed)  Rolling to Left: Dependent/Total (use of mechanical bed)  Rolling to Right: Minimal assistance  Supine to Sit: 2 Person assistance (pt able to walk legs over to EOB, then assistance of therapist to scoot hips forward. Pt used a push up maneuver to move hips forward.)  Sit to Supine: Moderate assistance, 2 Person assistance (HOB flat, dependent to scoot up in bed)  Scooting: Maximal assistance (use of glider sheet and patient performed push up maneuver to scoot hips forward.)  Bed Mobility Comments: AM tolerance progressed to EOB, use of fabiola stedy, then to early mobility chair. vitals and symptoms monitored for tolerance before, during and after movements. While patient sitting up in chair bed position. patient tolerated min therex during sitting activity. PM patient able to use scooting and hip walking to guide hips on early mobility chair and in bed. While in early mobilty chair patient tolerated min ther ex and ther act.    Transfers  Sit to Stand: Dependent/Total (fabiola stedy)  Stand to Sit: Dependent/Total (fabiola stedy)  Bed to Chair: 2 Person Assistance, Dependent/Total (use of fabiola stedy)  Stand Pivot Transfers: Unable to  PROFILE:   No results for input(s): \"AST\", \"ALT\", \"ALB\", \"BILIDIR\", \"BILITOT\", \"ALKPHOS\" in the last 72 hours.       I/O (24Hr):  No intake or output data in the 24 hours ending 04/17/24 1322      Glu last 24 hour  Recent Labs     04/16/24  1628 04/16/24  1955 04/17/24  0552 04/17/24  1128   POCGLU 162* 198* 161* 175*         No results for input(s): \"CLARITYU\", \"COLORU\", \"PHUR\", \"SPECGRAV\", \"PROTEINU\", \"RBCUA\", \"BLOODU\", \"BACTERIA\", \"NITRU\", \"WBCUA\", \"LEUKOCYTESUR\", \"YEAST\", \"GLUCOSEU\", \"BILIRUBINUR\" in the last 72 hours.        Impression/Plan:    Ambulatory and ADL dysfunction due to left subtrochanteric femur fracture right femoral shaft stress fracture status post IM nailing bilaterally-weightbearing as tolerated-continue rehab efforts limited due to hypotension appears to be improving  Left subtrochanteric femur fracture and right femoral shaft stress fracture status post IM nailing weightbearing as tolerated monitor incisions appear clean follow-up Dr. Foley 4/25 continue dressing  Pain - oxycodone -hold on increasing oxycodone due to decreased blood pressure scheduled Tylenol, decrease Robaxin  Question orthostatic hypotension-decreased blood pressure on standing-decreased Robaxin, Flomax placed on hold, hold on increasing pain meds due to decreased blood pressure, Ace wrap bilateral lower extremity,  abdominal binder, patient not on blood pressure medications (lisinopril was discontinued)-discussed with internal medicine avoid Florinef due to osteoporosis, workup in progress  Osteoporosis was on biphosphonate's felt to be cause of fracture  Mild hyponatremia-sodium 134  Thrombocytopenia-improved 130-188  Urinary retention informed PVR over 750 Quintero replaced urology consulted await follow-up, UTI started on antibiotics CNS noted, defer to internal medicine if needed any change adjustment currently continues on IV Rocephin internal medicine follow appreciate urology follow-up continue Quintero catheter until

## 2024-04-17 NOTE — PROGRESS NOTES
Physical Therapy  Facility/Department: [facility] ACUTE REHAB  Rehabilitation Physical Therapy Note    NAME: Dariana Juan  : 1955 (68 y.o.)  MRN: [id number]  CODE STATUS: Full Code    Date of Service: 24      Past Medical History:   Diagnosis Date    BRCA1 gene mutation positive     BRCA2 gene mutation positive     Cancer (HCC)     melonoma, left arm, melanoma back of neck     Past Surgical History:   Procedure Laterality Date    BLADDER SUSPENSION      prolapse repair    BREAST BIOPSY      three titaanium markers in left breast      SECTION      CHOLECYSTECTOMY      COLONOSCOPY      EMG          FEMUR COMFORT NAIL INSERTION  2024    FEMUR COMFORT NAIL INSERTION Right 04/10/2024    PROPHYLACTIC INTRAMEDULLARY NAIL INSERTION FEMUR    HYSTERECTOMY (CERVIX STATUS UNKNOWN)      has lone ovary    THYROID SURGERY      TIBIA FRACTURE SURGERY Left 2024    FEMORAL INTRAMEDULLARY NAIL INSERTION performed by Patricio Caruso DO at [facility]    TIBIA FRACTURE SURGERY Right 4/10/2024    PROPHYLACTIC INTRAMEDULLARY NAIL INSERTION FEMUR performed by Patricio Caruso DO at [facility] OR       Chart Reviewed: Yes  Patient assessed for rehabilitation services?: Yes  Additional Pertinent Hx: recent removal of melnoma L upper arm in March  Family / Caregiver Present: No  Referring Practitioner: Rain  Referral Date : 24  Diagnosis: bilateral femur fxs with ORIF  General Comment  Comments: Pt left in bed with room with bed alarm on, call light within reach    Restrictions:  Restrictions/Precautions: General Precautions;Fall Risk;Weight Bearing  Lower Extremity Weight Bearing Restrictions  Right Lower Extremity Weight Bearing: Weight Bearing As Tolerated  Left Lower Extremity Weight Bearing: Weight Bearing As Tolerated  Position Activity Restriction  Other position/activity restrictions: Left subtrochanteric femur fracture s/p IMN, DOS: 24 - Right femoral shaft stress fracture s/p IMN, DOS: 4/10/24      SUBJECTIVE  Subjective: A.M. Upon arrival pt in early mobility chair and agreeable to PT session. pt reported she slept well and pain reported to be 2/10 while in chair.                 OBJECTIVE                         Functional Mobility  Transfers  Sit to Stand: Dependent/Total (fabiola stedy); max assist of 2 with sit to stand from elevated heights mobility lift chair   Stand to Sit: Dependent/Total (fabiola stedy)  Bed to Chair: 2 Person Assistance;Dependent/Total (use of fabiola stedy)  Stand Pivot Transfers: Unable to assess  Comment: Pt use of RW to transfer from early mobility chair to standing position. Pt was able to push to stand with maxA. Pt was able to walk feet back towards chair. Vitals monitored for orthostatic behaviors throughout treatment. Wheelchair used with Max A to sit down after ambulation. Use of Fabiola stedy to stand up from wheelchair to gym mat and back.  Balance  Posture: Good  Sitting - Static: Fair;+  Sitting - Dynamic: Fair;+  Standing - Static: Poor  Standing - Dynamic: Poor  Comments: seated balance assessed EOM, standing balance assessed with RW and Fabiola Stedy    Environmental Mobility  Ambulation  Surface: Level tile  Device: Rolling Walker  Assistance: 2 Person assistance;Maximum assistance (3 person assist, 2max A for ambulation and cues, 1 person wheelchair follow.)  Quality of Gait: small shuffled steps, wieght shifting primarily R side.  Gait Deviations: Slow Michelle;Increased JUAN;Decreased step length;Decreased step height;Shuffles  Distance: 6ft  Comments: Pt able to achieve use of RW in AM to transfer from early mobility chair to standing position. Use of early mobility to assist of stand, cues for pushing through arms to stand, walking feet towards chair, stand tall and push through arms on walker and continued breathing techniques. Vitals monitored throughout all positional changes to watch for signs of orthostatic hypotension. Pt able to walk 3 ft then rest, walk 3 ft then sit

## 2024-04-17 NOTE — CARE COORDINATION
ARU CASE MANAGEMENT NOTE:    Admission Date:  4/13/2024 Dariana Juan is a 68 y.o.  female    Admitted for : Closed left hip fracture, sequela [S72.002S]    Patient is alert and oriented x4.    Spoke with patient regarding discharge plan:  Plan to go Home with Spouse Home Health will probably be best for this pt. As her  has Cancer and transportation could be an issue  will continue to evaluate. Previous facility had ordered a walker for patient   we will hold off until therapy makes recommendations     Outside appointments while in ARU: 4/25 Ortho Dr. Caruso     Will continue to follow for additional discharge needs.    Electronically signed by Maria Antonia Lorenzo RN on 4/17/2024 at 10:38 AM

## 2024-04-18 LAB
GLUCOSE BLD-MCNC: 124 MG/DL (ref 65–105)
GLUCOSE BLD-MCNC: 127 MG/DL (ref 65–105)
GLUCOSE BLD-MCNC: 139 MG/DL (ref 65–105)
GLUCOSE BLD-MCNC: 143 MG/DL (ref 65–105)

## 2024-04-18 PROCEDURE — 6370000000 HC RX 637 (ALT 250 FOR IP): Performed by: PHYSICAL MEDICINE & REHABILITATION

## 2024-04-18 PROCEDURE — 99232 SBSQ HOSP IP/OBS MODERATE 35: CPT | Performed by: INTERNAL MEDICINE

## 2024-04-18 PROCEDURE — 97110 THERAPEUTIC EXERCISES: CPT

## 2024-04-18 PROCEDURE — 82947 ASSAY GLUCOSE BLOOD QUANT: CPT

## 2024-04-18 PROCEDURE — 97116 GAIT TRAINING THERAPY: CPT

## 2024-04-18 PROCEDURE — 2580000003 HC RX 258: Performed by: INTERNAL MEDICINE

## 2024-04-18 PROCEDURE — 6370000000 HC RX 637 (ALT 250 FOR IP): Performed by: INTERNAL MEDICINE

## 2024-04-18 PROCEDURE — 97535 SELF CARE MNGMENT TRAINING: CPT

## 2024-04-18 PROCEDURE — 99232 SBSQ HOSP IP/OBS MODERATE 35: CPT | Performed by: PHYSICAL MEDICINE & REHABILITATION

## 2024-04-18 PROCEDURE — 6360000002 HC RX W HCPCS: Performed by: INTERNAL MEDICINE

## 2024-04-18 PROCEDURE — 97530 THERAPEUTIC ACTIVITIES: CPT

## 2024-04-18 PROCEDURE — 6360000002 HC RX W HCPCS: Performed by: NURSE PRACTITIONER

## 2024-04-18 PROCEDURE — 6370000000 HC RX 637 (ALT 250 FOR IP): Performed by: NURSE PRACTITIONER

## 2024-04-18 PROCEDURE — 1180000000 HC REHAB R&B

## 2024-04-18 RX ORDER — CEPHALEXIN 250 MG/1
250 CAPSULE ORAL EVERY 6 HOURS SCHEDULED
Status: DISCONTINUED | OUTPATIENT
Start: 2024-04-19 | End: 2024-04-24

## 2024-04-18 RX ORDER — CEPHALEXIN 250 MG/1
250 CAPSULE ORAL EVERY 6 HOURS SCHEDULED
Status: DISCONTINUED | OUTPATIENT
Start: 2024-04-19 | End: 2024-04-18

## 2024-04-18 RX ADMIN — ENOXAPARIN SODIUM 30 MG: 100 INJECTION SUBCUTANEOUS at 07:48

## 2024-04-18 RX ADMIN — METFORMIN HYDROCHLORIDE 1000 MG: 1000 TABLET, FILM COATED ORAL at 16:24

## 2024-04-18 RX ADMIN — ATORVASTATIN CALCIUM 10 MG: 10 TABLET, FILM COATED ORAL at 21:19

## 2024-04-18 RX ADMIN — OXYCODONE HYDROCHLORIDE 5 MG: 5 TABLET ORAL at 07:48

## 2024-04-18 RX ADMIN — CEFTRIAXONE SODIUM 1000 MG: 1 INJECTION, POWDER, FOR SOLUTION INTRAMUSCULAR; INTRAVENOUS at 16:27

## 2024-04-18 RX ADMIN — GABAPENTIN 100 MG: 100 CAPSULE ORAL at 07:47

## 2024-04-18 RX ADMIN — GABAPENTIN 100 MG: 100 CAPSULE ORAL at 21:20

## 2024-04-18 RX ADMIN — ACETAMINOPHEN 1000 MG: 500 TABLET ORAL at 21:21

## 2024-04-18 RX ADMIN — ACETAMINOPHEN 1000 MG: 500 TABLET ORAL at 15:17

## 2024-04-18 RX ADMIN — METHOCARBAMOL 500 MG: 500 TABLET ORAL at 07:47

## 2024-04-18 RX ADMIN — METHOCARBAMOL 500 MG: 500 TABLET ORAL at 15:16

## 2024-04-18 RX ADMIN — OXYCODONE HYDROCHLORIDE 5 MG: 5 TABLET ORAL at 21:20

## 2024-04-18 RX ADMIN — ACETAMINOPHEN 1000 MG: 500 TABLET ORAL at 05:33

## 2024-04-18 RX ADMIN — OXYCODONE HYDROCHLORIDE 5 MG: 5 TABLET ORAL at 16:24

## 2024-04-18 RX ADMIN — METHOCARBAMOL 500 MG: 500 TABLET ORAL at 21:19

## 2024-04-18 RX ADMIN — LEVOTHYROXINE SODIUM 100 MCG: 0.1 TABLET ORAL at 05:33

## 2024-04-18 RX ADMIN — METFORMIN HYDROCHLORIDE 1000 MG: 1000 TABLET, FILM COATED ORAL at 07:47

## 2024-04-18 RX ADMIN — ENOXAPARIN SODIUM 30 MG: 100 INJECTION SUBCUTANEOUS at 21:21

## 2024-04-18 RX ADMIN — OXYCODONE HYDROCHLORIDE 5 MG: 5 TABLET ORAL at 12:02

## 2024-04-18 ASSESSMENT — PAIN DESCRIPTION - LOCATION
LOCATION: HIP
LOCATION: LEG
LOCATION: HIP
LOCATION: LEG

## 2024-04-18 ASSESSMENT — PAIN SCALES - GENERAL
PAINLEVEL_OUTOF10: 1
PAINLEVEL_OUTOF10: 6
PAINLEVEL_OUTOF10: 3
PAINLEVEL_OUTOF10: 5
PAINLEVEL_OUTOF10: 4
PAINLEVEL_OUTOF10: 1
PAINLEVEL_OUTOF10: 4
PAINLEVEL_OUTOF10: 0
PAINLEVEL_OUTOF10: 2
PAINLEVEL_OUTOF10: 4
PAINLEVEL_OUTOF10: 3
PAINLEVEL_OUTOF10: 1
PAINLEVEL_OUTOF10: 4

## 2024-04-18 ASSESSMENT — PAIN - FUNCTIONAL ASSESSMENT
PAIN_FUNCTIONAL_ASSESSMENT: ACTIVITIES ARE NOT PREVENTED
PAIN_FUNCTIONAL_ASSESSMENT: PREVENTS OR INTERFERES SOME ACTIVE ACTIVITIES AND ADLS
PAIN_FUNCTIONAL_ASSESSMENT: ACTIVITIES ARE NOT PREVENTED
PAIN_FUNCTIONAL_ASSESSMENT: PREVENTS OR INTERFERES SOME ACTIVE ACTIVITIES AND ADLS
PAIN_FUNCTIONAL_ASSESSMENT: ACTIVITIES ARE NOT PREVENTED
PAIN_FUNCTIONAL_ASSESSMENT: PREVENTS OR INTERFERES SOME ACTIVE ACTIVITIES AND ADLS

## 2024-04-18 ASSESSMENT — PAIN DESCRIPTION - PAIN TYPE: TYPE: ACUTE PAIN;SURGICAL PAIN

## 2024-04-18 ASSESSMENT — PAIN DESCRIPTION - ORIENTATION
ORIENTATION: LEFT
ORIENTATION: LEFT;RIGHT
ORIENTATION: RIGHT;LEFT
ORIENTATION: LEFT
ORIENTATION: RIGHT;LEFT
ORIENTATION: LEFT
ORIENTATION: LEFT;RIGHT

## 2024-04-18 ASSESSMENT — PAIN DESCRIPTION - DESCRIPTORS
DESCRIPTORS: ACHING

## 2024-04-18 NOTE — PROGRESS NOTES
Physical Medicine & Rehabilitation  Progress Note    4/18/2024 3:05 PM     CC: Ambulatory and ADL dysfunction due to  left subtrochanteric femur fracture right femoral shaft stress fracture status post IM nailing bilaterally     Subjective:   Feels well.  Pain controlled at rest.  Appears to be doing better today able to stand and take a few steps    ROS:  Denies fevers, chills, sweats.  No chest pain, palpitations, lightheadedness.  Denies coughing, wheezing or shortness of breath.  Denies abdominal pain, nausea, diarrhea or constipation.  No new areas of joint pain.  Denies new areas of numbness or weakness.  Denies new anxiety or depression issues.  No new skin problems.    Rehabilitation:   PT:    Bed mobility  Bridging: Dependent/Total (use of mechanical bed)  Rolling to Left: Dependent/Total (use of mechanical bed)  Rolling to Right: Minimal assistance  Supine to Sit: 2 Person assistance (pt able to walk legs over to EOB, then assistance of therapist to scoot hips forward. Pt used a push up maneuver to move hips forward.)  Sit to Supine: Moderate assistance, 2 Person assistance (HOB flat, dependent to scoot up in bed)  Scooting: Maximal assistance (use of glider sheet and patient performed push up maneuver to scoot hips forward.)  Bed Mobility Comments: AM tolerance progressed to EOB, use of fabiola stedy, then to early mobility chair. vitals and symptoms monitored for tolerance before, during and after movements. While patient sitting up in chair bed position. patient tolerated min therex during sitting activity. PM patient able to use scooting and hip walking to guide hips on early mobility chair and in bed. While in early mobilty chair patient tolerated min ther ex and ther act.    Transfers  Sit to Stand: Maximum Assistance, 2 Person Assistance (use of recliner chair to push and stand with RW)  Stand to Sit: Maximum Assistance, 2 Person Assistance (RW to recliner with 1 ModA, 1 CGA/Yudith)  Bed to Chair: 2

## 2024-04-18 NOTE — PLAN OF CARE
Problem: Discharge Planning  Goal: Discharge to home or other facility with appropriate resources  4/18/2024 0033 by Lorna Donald LPN  Outcome: Progressing     Problem: Safety - Adult  Goal: Free from fall injury  4/18/2024 0033 by Lorna Donald LPN  Outcome: Progressing     Problem: Skin/Tissue Integrity  Goal: Absence of new skin breakdown  Description: 1.  Monitor for areas of redness and/or skin breakdown  2.  Assess vascular access sites hourly  3.  Every 4-6 hours minimum:  Change oxygen saturation probe site  4.  Every 4-6 hours:  If on nasal continuous positive airway pressure, respiratory therapy assess nares and determine need for appliance change or resting period.  4/18/2024 0033 by Lorna Donald LPN  Outcome: Progressing     Problem: ABCDS Injury Assessment  Goal: Absence of physical injury  4/18/2024 0033 by Lorna Donald LPN  Outcome: Progressing     Problem: Pain  Goal: Verbalizes/displays adequate comfort level or baseline comfort level  4/18/2024 0033 by Lorna Donald LPN  Outcome: Progressing     Problem: Nutrition Deficit:  Goal: Optimize nutritional status  4/18/2024 0033 by Lorna Donald LPN  Outcome: Progressing

## 2024-04-18 NOTE — PROGRESS NOTES
SUBJECTIVE  Subjective: AM upon arrival patient in recliner wheelchair, increased soreness within LLE, current pain is 2/10. Patient reports she was able to transfer from bed to recliner chair with RW.  Pain: no reports of dizziness or warmth during today PT session.                 OBJECTIVE                 Functional Mobility  Transfers  Sit to Stand: Maximum Assistance;2 Person Assistance (use of recliner chair to push and stand with RW)  Stand to Sit: Maximum Assistance;2 Person Assistance (RW to recliner with 1 ModA, 1 CGA/Yudith)  Comment: Pt use of RW to transfer from recliner chair to standing position. Pt was able to push to stand with maxA with 2 assist. Vitals monitored for orthostatic behaviors throughout treatment. Wheelchair used with Max A to sit down after ambulation.    Environmental Mobility  Ambulation  Surface: Level tile  Device: Rolling Walker  Other Apparatus: Wheelchair follow  Assistance: 2 Person assistance;Maximum assistance (1 MaxA, 1 CGA,SBA for cues)  Quality of Gait: small stiff leg steps  Gait Deviations: Slow Michelle;Decreased step length;Decreased step height;Staggers  Distance: 114 ft  Comments: Pt able to achieve increased distance today with use of RW, 2 person assist with wheelchair follow. Vitals monitored throughout ambulation for orthostatic hypotension behaviors. Pt received cues for increased step height and length. Pt required minimal standing rest breaks to rest BUE and focus on breathing techniques. Pain reported to be 2.5/10 during ambulation. Pt terminated ambulation due to c/o fatigue.  More Ambulation?: No  Stairs/Curb  Stairs?: No  Wheelchair Activities  Wheelchair Size: 20\"  Wheelchair Type: Recliner  Wheelchair Cushion: Standard  Level of Assistance for pressure relief activities: Stand by assistance    PT Exercises  Exercise Treatment: LAQs x 10 BLE, LAQs & DF x 5 BLE,ankle pumps x 10 seated (all seated in recliner chair)  PROM Exercises: gentle prolonged knee  flexion, gentle knee extension, gentle ankle DF, gentle ankle PF, gentle ankle IV/EV (all BLE)  Breathing Techniques: pursed lip breathing    ASSESSMENT  Vitals  BP: 132/66  MAP (Calculated): 88  BP Location: Left upper arm  BP Method: Automatic    Activity Tolerance  Activity Tolerance: Patient tolerated treatment well;Patient limited by fatigue  Activity Tolerance Comments: 2.5/10 pain reported during ambulation, amublation terminated due to increased fatigue.            GOALS  Patient Goals   Patient Goals : To return home  Short Term Goals  Time Frame for Short Term Goals: 1week  Short Term Goal 1: Pt to complete bed mobility with min assist using compensatory strategies to manage legs on/off bed with  Short Term Goal 2: Pt tolerate  unsupported sitting for static and dynamic activity ie ADL, anf therex with stable vital signs with SBA  Short Term Goal 3: increase Bilat knee ROM 10-20 deg via positioning and gentle stretching  Short Term Goal 4: tolerate standing in fabiola stedy vs // bars vs RW with min assist x2 with stable vital signs  Short Term Goal 5: pt to perform sit pivot vs slide board transfers bed to/from mobility chair vs w/c with min x2  Long Term Goals  Time Frame for Long Term Goals : LOS  Long Term Goal 1: pt to perform sit tostand transfers with RW with mod assist  Long Term Goal 2:  (Pt to increase Bilat knee ROM to 0-90 degrees to allow for Sit to stands with minimal compensation)  Long Term Goal 3: Pt toincrease BLE strength to 3+/5 as demonstrated by Sit tostands and  Long Term Goal 4: ambulate with RW with min asssit x2, 20-50 ft  Long Term Goal 5: complete family education regrading home modifications, body mechanincs to assists pt safely  with transfers and mobility    PLAN OF CARE  Frequency: 1-2 treatment sessions per day, 5-7 days per week  Safety Devices  Type of Devices: All fall risk precautions in place;Call light within reach;Chair alarm in place;Gait belt;Patient at risk for

## 2024-04-18 NOTE — CARE COORDINATION
ARU CASE MANAGEMENT NOTE:    Admission Date:  4/13/2024 Dariana Juan is a 68 y.o.  female    Admitted for : Closed left hip fracture, sequela [S72.002S]    Following patient for discharge planning. Patient would like [facility] transportation would be a hardship since  has health issues. Patient is requesting [facility]    Outside appointments while in ARU: Dr. Briones,4/25/2024    Will continue to follow for additional discharge needs.    Electronically signed by Maria F Freyer, RN on 4/18/2024 at 1:36 PM

## 2024-04-18 NOTE — PROGRESS NOTES
[facility]   Acute Rehabilitation Occupational Therapy Daily Treatment Note    Date: 24  Patient Name: Dariana Juan       Room: 2639/2639-01  MRN: [id number]  Account: [id number]   : 1955  (68 y.o.) Gender: female       Referring Practitioner: Aaron Rodrigez MD  Diagnosis: Left subtrochanteric femur fracture and right femoral stress fracture status post IM nailing-felt to be related biphosphonate  Additional Pertinent Hx: Ms. Dariana Juan is a 68 y.o. female who was admitted to [facility] on 2024 with Fall and Dislocation. 68-year-old female admitted [facility] for left thigh pain and deformity-noted playing hockey with her grandson and fell on her left leg she had immediate pain found to have left femur fracture and baseline lives with her spouse is independent spouse has cancer and is unable provide much assistance.  Patient takes biphosphonate's for osteoporosis and neuropathy of unknown origin. Ortho-left subtrochanteric femur fracture status post IMN and right femoral shaft stress fracture plan to go to the OR/10/24 for treatment right femoral shaft stress fracture weightbearing as tolerated left lower extremity nonweightbearing right lower extremity. Felt to be biphosphonate related fracture. Trauma-as above. Pt admitted to ARU on 2024.    Treatment Diagnosis: Impaired self-care status    Past Medical History:  has a past medical history of BRCA1 gene mutation positive, BRCA2 gene mutation positive, and Cancer (HCC).    Past Surgical History:   has a past surgical history that includes Breast biopsy;  section; Hysterectomy; Thyroid surgery; Cholecystectomy; Colonoscopy; bladder suspension; EMG; Femur Sarah nail insertion (2024); Tibia fracture surgery (Left, 2024); Femur Sarah nail insertion (Right, 04/10/2024); and Tibia fracture surgery (Right, 4/10/2024).    Restrictions  Restrictions/Precautions  Restrictions/Precautions:  set-up with Good safety  Short Term Goal 2: Pt will perform LB bathing/dressing Mod A with Good safety and use of AE/modified techniques as needed  Short Term Goal 3: Pt will perform functional transfers Mod A during self-care tasks with Good safety and use of least restrictive device  Short Term Goal 4: Pt will tolerate sitting EOB 10+ minutes during functional activity of choice SBA to improve dynamic balance and reaching outside JUAN for participation in self-care tasks  Short Term Goal 5: Pt will verbalize/demonstrate Good understanding of AE/DME/modified techniques to promote ease and independence with self-care tasks  Short Term Goal 6: Pt will verbalize/demonstrate Good understanding of 2-3 non-pharmaceutical pain management strategies to increase participation in self-care tasks and to improve quality of life  Short Term Goal 7: Pt will verbalize/demonstrate Good understanding of EC/WS strategies to increase participation and safety during daily activities  Short Term Goal 8: Pt will actively participate in 30+ minutes of therapeutic exercise/functional activity/social participation to promote safety and independence with self-care tasks and to improve overall quality of life    Long Term Goals  Time Frame for Long Term Goals : By discharge  Long Term Goal 1: Pt will perform UB bathing/dressing Mod I with Good safety  Long Term Goal 2: Pt will perform LB bathing/dressing Min A with Good safety and use of AE/modified techniques as needed  Long Term Goal 3: Pt will perform functional transfers/mobility Min A during self-care tasks with Good safety and use of least restrictive device  Long Term Goal 4: Pt will tolerate standing 8+ minutes during functional activity of choice Min A to improve balance/tolerance for self-care tasks  Long Term Goal 5: Pt will perform tub transfer Min A with Good safety and appropriate use of AE/DME as needed  Long Term Goal 6: Pt will verbalize/demonstrate Good understanding of home

## 2024-04-19 LAB
GLUCOSE BLD-MCNC: 120 MG/DL (ref 65–105)
GLUCOSE BLD-MCNC: 137 MG/DL (ref 65–105)
GLUCOSE BLD-MCNC: 141 MG/DL (ref 65–105)
GLUCOSE BLD-MCNC: 154 MG/DL (ref 65–105)

## 2024-04-19 PROCEDURE — 6370000000 HC RX 637 (ALT 250 FOR IP): Performed by: PHYSICAL MEDICINE & REHABILITATION

## 2024-04-19 PROCEDURE — 6370000000 HC RX 637 (ALT 250 FOR IP): Performed by: NURSE PRACTITIONER

## 2024-04-19 PROCEDURE — 6370000000 HC RX 637 (ALT 250 FOR IP): Performed by: INTERNAL MEDICINE

## 2024-04-19 PROCEDURE — 6360000002 HC RX W HCPCS: Performed by: NURSE PRACTITIONER

## 2024-04-19 PROCEDURE — 97116 GAIT TRAINING THERAPY: CPT

## 2024-04-19 PROCEDURE — 99232 SBSQ HOSP IP/OBS MODERATE 35: CPT | Performed by: PHYSICAL MEDICINE & REHABILITATION

## 2024-04-19 PROCEDURE — 97530 THERAPEUTIC ACTIVITIES: CPT

## 2024-04-19 PROCEDURE — 82947 ASSAY GLUCOSE BLOOD QUANT: CPT

## 2024-04-19 PROCEDURE — 97110 THERAPEUTIC EXERCISES: CPT

## 2024-04-19 PROCEDURE — 97535 SELF CARE MNGMENT TRAINING: CPT

## 2024-04-19 PROCEDURE — 1180000000 HC REHAB R&B

## 2024-04-19 PROCEDURE — 99232 SBSQ HOSP IP/OBS MODERATE 35: CPT | Performed by: INTERNAL MEDICINE

## 2024-04-19 PROCEDURE — 97542 WHEELCHAIR MNGMENT TRAINING: CPT

## 2024-04-19 RX ORDER — ACETAMINOPHEN 325 MG/1
650 TABLET ORAL EVERY 8 HOURS SCHEDULED
Status: DISCONTINUED | OUTPATIENT
Start: 2024-04-19 | End: 2024-04-25 | Stop reason: HOSPADM

## 2024-04-19 RX ADMIN — OXYCODONE HYDROCHLORIDE 5 MG: 5 TABLET ORAL at 12:24

## 2024-04-19 RX ADMIN — METHOCARBAMOL 500 MG: 500 TABLET ORAL at 14:50

## 2024-04-19 RX ADMIN — GABAPENTIN 100 MG: 100 CAPSULE ORAL at 20:12

## 2024-04-19 RX ADMIN — ATORVASTATIN CALCIUM 10 MG: 10 TABLET, FILM COATED ORAL at 20:12

## 2024-04-19 RX ADMIN — ENOXAPARIN SODIUM 30 MG: 100 INJECTION SUBCUTANEOUS at 20:39

## 2024-04-19 RX ADMIN — CEPHALEXIN 250 MG: 250 CAPSULE ORAL at 17:15

## 2024-04-19 RX ADMIN — CEPHALEXIN 250 MG: 250 CAPSULE ORAL at 23:35

## 2024-04-19 RX ADMIN — METFORMIN HYDROCHLORIDE 1000 MG: 1000 TABLET, FILM COATED ORAL at 08:49

## 2024-04-19 RX ADMIN — METHOCARBAMOL 500 MG: 500 TABLET ORAL at 20:12

## 2024-04-19 RX ADMIN — ACETAMINOPHEN 1000 MG: 500 TABLET ORAL at 06:23

## 2024-04-19 RX ADMIN — CEPHALEXIN 250 MG: 250 CAPSULE ORAL at 06:23

## 2024-04-19 RX ADMIN — GABAPENTIN 100 MG: 100 CAPSULE ORAL at 08:48

## 2024-04-19 RX ADMIN — ENOXAPARIN SODIUM 30 MG: 100 INJECTION SUBCUTANEOUS at 08:49

## 2024-04-19 RX ADMIN — METFORMIN HYDROCHLORIDE 1000 MG: 1000 TABLET, FILM COATED ORAL at 17:15

## 2024-04-19 RX ADMIN — OXYCODONE HYDROCHLORIDE 5 MG: 5 TABLET ORAL at 22:34

## 2024-04-19 RX ADMIN — OXYCODONE HYDROCHLORIDE 5 MG: 5 TABLET ORAL at 08:32

## 2024-04-19 RX ADMIN — CEPHALEXIN 250 MG: 250 CAPSULE ORAL at 00:44

## 2024-04-19 RX ADMIN — METHOCARBAMOL 500 MG: 500 TABLET ORAL at 08:48

## 2024-04-19 RX ADMIN — LEVOTHYROXINE SODIUM 100 MCG: 0.1 TABLET ORAL at 06:23

## 2024-04-19 RX ADMIN — ACETAMINOPHEN 650 MG: 325 TABLET ORAL at 20:12

## 2024-04-19 RX ADMIN — OXYCODONE HYDROCHLORIDE 5 MG: 5 TABLET ORAL at 17:42

## 2024-04-19 RX ADMIN — CEPHALEXIN 250 MG: 250 CAPSULE ORAL at 12:26

## 2024-04-19 RX ADMIN — OXYCODONE HYDROCHLORIDE 5 MG: 5 TABLET ORAL at 03:30

## 2024-04-19 ASSESSMENT — PAIN DESCRIPTION - ORIENTATION
ORIENTATION: RIGHT;LEFT
ORIENTATION: RIGHT;LEFT
ORIENTATION: LEFT
ORIENTATION: RIGHT;LEFT
ORIENTATION: LEFT
ORIENTATION: RIGHT;LEFT

## 2024-04-19 ASSESSMENT — PAIN DESCRIPTION - LOCATION
LOCATION: LEG
LOCATION: HIP
LOCATION: HIP
LOCATION: LEG
LOCATION: LEG
LOCATION: HIP

## 2024-04-19 ASSESSMENT — PAIN DESCRIPTION - DESCRIPTORS
DESCRIPTORS: ACHING

## 2024-04-19 ASSESSMENT — PAIN SCALES - GENERAL
PAINLEVEL_OUTOF10: 4
PAINLEVEL_OUTOF10: 2
PAINLEVEL_OUTOF10: 0

## 2024-04-19 ASSESSMENT — PAIN - FUNCTIONAL ASSESSMENT
PAIN_FUNCTIONAL_ASSESSMENT: PREVENTS OR INTERFERES SOME ACTIVE ACTIVITIES AND ADLS
PAIN_FUNCTIONAL_ASSESSMENT: ACTIVITIES ARE NOT PREVENTED
PAIN_FUNCTIONAL_ASSESSMENT: PREVENTS OR INTERFERES SOME ACTIVE ACTIVITIES AND ADLS

## 2024-04-19 NOTE — PLAN OF CARE
Problem: Discharge Planning  Goal: Discharge to home or other facility with appropriate resources  4/19/2024 0019 by Brittny Gutierrez RN  Outcome: Progressing  4/18/2024 1506 by Jacklyn Jameson RN  Outcome: Progressing     Problem: Safety - Adult  Goal: Free from fall injury  4/19/2024 0019 by Brittny Gutierrez RN  Outcome: Progressing  4/18/2024 1506 by Jacklyn Jameson RN  Outcome: Progressing     Problem: Skin/Tissue Integrity  Goal: Absence of new skin breakdown  Description: 1.  Monitor for areas of redness and/or skin breakdown  2.  Assess vascular access sites hourly  3.  Every 4-6 hours minimum:  Change oxygen saturation probe site  4.  Every 4-6 hours:  If on nasal continuous positive airway pressure, respiratory therapy assess nares and determine need for appliance change or resting period.  4/19/2024 0019 by Brittny Gutierrez RN  Outcome: Progressing  4/18/2024 1506 by Jacklyn Jameson RN  Outcome: Progressing     Problem: ABCDS Injury Assessment  Goal: Absence of physical injury  4/19/2024 0019 by Brittny Gutierrez RN  Outcome: Progressing  4/18/2024 1506 by Jacklyn Jameson RN  Outcome: Progressing     Problem: Pain  Goal: Verbalizes/displays adequate comfort level or baseline comfort level  4/19/2024 0019 by Brittny Gutierrez RN  Outcome: Progressing  4/18/2024 1506 by Jacklyn Jameson RN  Outcome: Progressing     Problem: Nutrition Deficit:  Goal: Optimize nutritional status  4/19/2024 0019 by Brittny Gutierrez RN  Outcome: Progressing  4/18/2024 1506 by Jacklyn Jameson RN  Outcome: Progressing  Flowsheets (Taken 4/18/2024 1139 by Angelika Rodas, RD, LD)  Nutrient intake appropriate for improving, restoring, or maintaining nutritional needs: Monitor oral intake, labs, and treatment plans

## 2024-04-19 NOTE — PROGRESS NOTES
[facility]   IN-PATIENT SERVICE   [facility]    Progress note            Date:   2024  Patient name:  Dariana Juan  Date of admission:  2024  2:37 PM  MRN:   [id number]  Account:  [id number]  YOB: 1955  PCP:    Kathryn Ortiz APRN - CNP  Room:   [address] [place] [address]  Code Status:    Full Code    Chief Complaint:     Rehab    History Obtained From:     patient    History of Present Illness:     The patient is a 68 y.o.  Non- / non  female who presents with No chief complaint on file.   and she is admitted to the rehab facility for therapy.    68-year-old female with history of osteoporosis, has been on bisphosphonates for several years now, with recommendations to switch to Prolia as per her primary physician given poor response to bisphosphonates, initially presented to outside hospital with left-sided femur fracture.  This was while she was playing hockey with her grandson.  While there she was also noted to have a right sided stress fracture.    Her past medical history significant for melanoma, BRCA gene mutation.    2024  Patient doing well, reports was able to tolerate therapy, no dizziness      Past Medical History:     Past Medical History:   Diagnosis Date    BRCA1 gene mutation positive     BRCA2 gene mutation positive     Cancer (HCC)     melonoma, left arm, melanoma back of neck        Past Surgical History:     Past Surgical History:   Procedure Laterality Date    BLADDER SUSPENSION      prolapse repair    BREAST BIOPSY      three titaanium markers in left breast      SECTION      CHOLECYSTECTOMY      COLONOSCOPY      EMG          FEMUR COMFORT NAIL INSERTION  2024    FEMUR COMFORT NAIL INSERTION Right 04/10/2024    PROPHYLACTIC INTRAMEDULLARY NAIL INSERTION FEMUR    HYSTERECTOMY (CERVIX STATUS UNKNOWN)      has lone ovary    THYROID SURGERY      TIBIA FRACTURE SURGERY Left 2024    FEMORAL INTRAMEDULLARY NAIL  rehab    Left femur fracture, s/p IM nailing 4/8/2024  Right femoral stress fracture s/p IM nailing 4/10/2024  Orthostatic hypotension, resolved as per numbers checked today however patient reports that he she did get hypotensive.  For now continue compression stockings and abdominal binder.  Workup for adrenal insufficiency negative.  ACTH levels within normal limits  Urine cultures growing E. coli, cultures reviewed.  Will switch to Keflex to complete a total of 10-day course  Osteoporosis, workup and management as per outpatient  Urinary retention, currently with a Quintero.  Tamsulosin held due to orthostatic hypotension  Prediabetes, blood sugars controlled  Normocytic normochromic anemia, no recent labs on the patient.  Back in 2017 hemoglobin was normal.  Currently with no evidence of bleeding.  Monitor for bleeding  Mild hyponatremia, recommend monitoring for now.  Patient euvolemic on exam  Elevated alk phos levels, liver source however no symptoms.  Monitor for now    Consultations:   IP CONSULT TO DIETITIAN  IP CONSULT TO SOCIAL WORK  IP CONSULT TO INTERNAL MEDICINE  IP CONSULT TO UROLOGY    Patient is admitted as inpatient status because of co-morbidities listed above, severity of signs and symptoms as outlined, requirement for current medical therapies and most importantly because of direct risk to patient if care not provided in a hospital setting.    Luzmaria Brock MD  4/18/2024  8:54 PM    Copy sent to Dr. Ortiz, Kathryn MADDEN, APRN - CNP    Please note that this chart was generated using voice recognition Dragon dictation software.  Although every effort was made to ensure the accuracy of this automated transcription, some errors in transcription may have occurred.

## 2024-04-19 NOTE — PROGRESS NOTES
Physical Medicine & Rehabilitation  Progress Note    4/19/2024 2:43 PM     CC: Ambulatory and ADL dysfunction due to  left subtrochanteric femur fracture right femoral shaft stress fracture status post IM nailing bilaterally     Subjective:   Feels well.  Pain controlled at rest.      ROS:  Denies fevers, chills, sweats.  No chest pain, palpitations, lightheadedness.  Denies coughing, wheezing or shortness of breath.  Denies abdominal pain, nausea, diarrhea or constipation.  No new areas of joint pain.  Denies new areas of numbness or weakness.  Denies new anxiety or depression issues.  No new skin problems.    Rehabilitation:   PT:    Bed mobility  Bridging: Dependent/Total (use of mechanical bed)  Rolling to Left: Dependent/Total (use of mechanical bed)  Rolling to Right: Minimal assistance  Supine to Sit: 2 Person assistance (pt able to walk legs over to EOB, then assistance of therapist to scoot hips forward. Pt used a push up maneuver to move hips forward.)  Sit to Supine: Moderate assistance, 2 Person assistance (HOB flat, dependent to scoot up in bed)  Scooting: Maximal assistance (use of glider sheet and patient performed push up maneuver to scoot hips forward.)  Bed Mobility Comments: AM tolerance progressed to EOB, use of fabiola stedy, then to early mobility chair. vitals and symptoms monitored for tolerance before, during and after movements. While patient sitting up in chair bed position. patient tolerated min therex during sitting activity. PM patient able to use scooting and hip walking to guide hips on early mobility chair and in bed. While in early mobilty chair patient tolerated min ther ex and ther act.    Transfers  Sit to Stand: Maximum Assistance, 2 Person Assistance (use of recliner chair to push and stand with RW)  Stand to Sit: Maximum Assistance, 2 Person Assistance (RW to recliner with 1 ModA, 1 CGA/Yudith)  Bed to Chair: 2 Person Assistance, Dependent/Total (use of fabiola stedy)  Stand Pivot

## 2024-04-19 NOTE — PROGRESS NOTES
[facility]   IN-PATIENT SERVICE   [facility]    Progress note            Date:   2024  Patient name:  Dariana Juan  Date of admission:  2024  2:37 PM  MRN:   [id number]  Account:  [id number]  YOB: 1955  PCP:    Kathryn Ortiz APRN - CNP  Room:   [address] [place] [address]  Code Status:    Full Code    Chief Complaint:     Rehab    History Obtained From:     patient    History of Present Illness:     The patient is a 68 y.o.  Non- / non  female who presents with No chief complaint on file.   and she is admitted to the rehab facility for therapy.    68-year-old female with history of osteoporosis, has been on bisphosphonates for several years now, with recommendations to switch to Prolia as per her primary physician given poor response to bisphosphonates, initially presented to outside hospital with left-sided femur fracture.  This was while she was playing hockey with her grandson.  While there she was also noted to have a right sided stress fracture.    Her past medical history significant for melanoma, BRCA gene mutation.    2024  Patient doing well, reports was able to tolerate therapy, no dizziness      Past Medical History:     Past Medical History:   Diagnosis Date    BRCA1 gene mutation positive     BRCA2 gene mutation positive     Cancer (HCC)     melonoma, left arm, melanoma back of neck        Past Surgical History:     Past Surgical History:   Procedure Laterality Date    BLADDER SUSPENSION      prolapse repair    BREAST BIOPSY      three titaanium markers in left breast      SECTION      CHOLECYSTECTOMY      COLONOSCOPY      EMG          FEMUR COMFORT NAIL INSERTION  2024    FEMUR COMFORT NAIL INSERTION Right 04/10/2024    PROPHYLACTIC INTRAMEDULLARY NAIL INSERTION FEMUR    HYSTERECTOMY (CERVIX STATUS UNKNOWN)      has lone ovary    THYROID SURGERY      TIBIA FRACTURE SURGERY Left 2024    FEMORAL INTRAMEDULLARY NAIL  hour   Intake --   Output 2150 ml   Net -2150 ml       General Appearance:  alert, well appearing, and in no acute distress.  Denies dizziness  Mental status: oriented to person, place, and time with normal affect  Head:  normocephalic, atraumatic.  Eye: no icterus, redness, pupils equal and reactive, extraocular eye movements intact, conjunctiva clear  Ear: normal external ear, no discharge, hearing intact  Nose:  no drainage noted  Mouth: mucous membranes moist  Neck: supple, no carotid bruits, thyroid not palpable  Lungs: Bilateral equal air entry, clear to ausculation, no wheezing, rales or rhonchi, normal effort  Cardiovascular: normal rate, regular rhythm, no murmur, gallop, rub.  Abdomen: Soft, nontender, nondistended, normal bowel sounds, no hepatomegaly or splenomegaly.  Has a Quintero in  Neurologic: Moving extremities however movement restricted due to pain from recent surgeries  Skin: No gross lesions, rashes, bruising or bleeding on exposed skin area  Extremities: Peripheral pulses palpable    Investigations:      Laboratory Testing:  Recent Results (from the past 24 hour(s))   POC Glucose Fingerstick    Collection Time: 04/18/24  4:26 PM   Result Value Ref Range    POC Glucose 143 (H) 65 - 105 mg/dL   POC Glucose Fingerstick    Collection Time: 04/18/24  8:38 PM   Result Value Ref Range    POC Glucose 139 (H) 65 - 105 mg/dL   POC Glucose Fingerstick    Collection Time: 04/19/24  6:27 AM   Result Value Ref Range    POC Glucose 137 (H) 65 - 105 mg/dL   POC Glucose Fingerstick    Collection Time: 04/19/24 12:04 PM   Result Value Ref Range    POC Glucose 120 (H) 65 - 105 mg/dL       Imaging/Diagnostics:    Reviewed    Assessment :      Primary Problem  Closed left hip fracture, sequela    Active Hospital Problems    Diagnosis Date Noted    Orthostatic hypotension [I95.1] 04/14/2024    Closed left hip fracture, sequela [S72.002S] 04/13/2024       Plan:     Patient status Admit as inpatient in the rehab    Left

## 2024-04-19 NOTE — PROGRESS NOTES
[facility]   Acute Rehabilitation Occupational Therapy Daily Treatment Note    Date: 24  Patient Name: Dariana Juan       Room: 2639/2639-01  MRN: [id number]  Account: [id number]   : 1955  (68 y.o.) Gender: female       Referring Practitioner: Aaron Rodrigez MD  Diagnosis: Left subtrochanteric femur fracture and right femoral stress fracture status post IM nailing-felt to be related biphosphonate  Additional Pertinent Hx: Ms. Dariana Juan is a 68 y.o. female who was admitted to [facility] on 2024 with Fall and Dislocation. 68-year-old female admitted [facility] for left thigh pain and deformity-noted playing hockey with her grandson and fell on her left leg she had immediate pain found to have left femur fracture and baseline lives with her spouse is independent spouse has cancer and is unable provide much assistance.  Patient takes biphosphonate's for osteoporosis and neuropathy of unknown origin. Ortho-left subtrochanteric femur fracture status post IMN and right femoral shaft stress fracture plan to go to the OR/10/24 for treatment right femoral shaft stress fracture weightbearing as tolerated left lower extremity nonweightbearing right lower extremity. Felt to be biphosphonate related fracture. Trauma-as above. Pt admitted to ARU on 2024.    Treatment Diagnosis: Impaired self-care status    Past Medical History:  has a past medical history of BRCA1 gene mutation positive, BRCA2 gene mutation positive, and Cancer (HCC).    Past Surgical History:   has a past surgical history that includes Breast biopsy;  section; Hysterectomy; Thyroid surgery; Cholecystectomy; Colonoscopy; bladder suspension; EMG; Femur Sarah nail insertion (2024); Tibia fracture surgery (Left, 2024); Femur Sarah nail insertion (Right, 04/10/2024); and Tibia fracture surgery (Right, 4/10/2024).    Restrictions  Restrictions/Precautions  Restrictions/Precautions:  restrictive device  Long Term Goal 4: Pt will tolerate standing 8+ minutes during functional activity of choice Min A to improve balance/tolerance for self-care tasks  Long Term Goal 5: Pt will perform tub transfer Min A with Good safety and appropriate use of AE/DME as needed  Long Term Goal 6: Pt will verbalize/demonstrate Good understanding of home safety/fall prevention strategies to promote safety and independence with daily activities  Long Term Goal 7: (Goal met with pt EVANGELINA COTA/L 4/15/24) OT to further assess fine motor control/coordination using the 9 hole peg test (inform OTR when complete to update goal)  Long Term Goal 8: (Goal met with pt EVANGELINA Suero OTR/L 4/15/24) OT to further assess hand/ strength using the hand dynamometer (Inform OTR when complete to update goal)    Plan  Occupational Therapy Plan  Times Per Week: 5-7  Times Per Day: Twice a day  Current Treatment Recommendations: Strengthening, Functional mobility training, Balance training, Endurance training, Pain management, Safety education & training, Patient/Caregiver education & training, Equipment evaluation, education, & procurement, Self-Care / ADL, Home management training, Co-Treatment     04/19/24 0900 04/19/24 1100   OT Individual Minutes   Time In 0901 1340   Time Out 1023 1405   Minutes 82 25           Electronically signed by NICO Ocasio on 4/19/24 at 2:49 PM EDT

## 2024-04-19 NOTE — PROGRESS NOTES
Physical Therapy  Facility/Department: [facility] [[email or web address] ACUTE REHAB  Rehabilitation Physical Therapy Progress Note    NAME: Dariana Juan  : 1955 (68 y.o.)  MRN: [id number]  CODE STATUS: Full Code    Date of Service: 24      Past Medical History:   Diagnosis Date    BRCA1 gene mutation positive     BRCA2 gene mutation positive     Cancer (HCC)     melonoma, left arm, melanoma back of neck     Past Surgical History:   Procedure Laterality Date    BLADDER SUSPENSION      prolapse repair    BREAST BIOPSY      three titaanium markers in left breast      SECTION      CHOLECYSTECTOMY      COLONOSCOPY      EMG          FEMUR COMFORT NAIL INSERTION  2024    FEMUR OCMFORT NAIL INSERTION Right 04/10/2024    PROPHYLACTIC INTRAMEDULLARY NAIL INSERTION FEMUR    HYSTERECTOMY (CERVIX STATUS UNKNOWN)      has lone ovary    THYROID SURGERY      TIBIA FRACTURE SURGERY Left 2024    FEMORAL INTRAMEDULLARY NAIL INSERTION performed by Patricio Caruso DO at [facility] OR    TIBIA FRACTURE SURGERY Right 4/10/2024    PROPHYLACTIC INTRAMEDULLARY NAIL INSERTION FEMUR performed by Patricio Caruso DO at [facility] OR       Chart Reviewed: Yes  Patient assessed for rehabilitation services?: Yes  Additional Pertinent Hx: recent removal of melnoma L upper arm in March  Family / Caregiver Present: No  Referring Practitioner: Rain  Referral Date : 24  Diagnosis: bilateral femur fxs with ORIF  Other (Comment): Pt is awake and alert in agreement with PT intevention, Pt with Hgb 6.9 okay per RN to see with activity as tolerated.  General Comment  Comments: Patient with increased edema & swelling of left thigh noted.    Restrictions:  Restrictions/Precautions: General Precautions;Fall Risk;Weight Bearing  Lower Extremity Weight Bearing Restrictions  Right Lower Extremity Weight Bearing: Weight Bearing As Tolerated  Left Lower Extremity Weight Bearing: Weight Bearing As Tolerated  Position Activity Restriction  Other  improveing rest, mobility, & participation  Education Method: Demonstration;Verbal  Barriers to Learning: None  Education Outcome: Verbalized understanding;Demonstrated understanding;Continued education needed      Therapy Time     04/19/24 1137 04/19/24 1640   PT Individual Minutes   Time In 1037 1307   Time Out 1147 1340   Minutes 70 33         aSlome Elizabeth PTA, 04/19/24 at 4:44 PM

## 2024-04-20 LAB
ALBUMIN SERPL-MCNC: 3.6 G/DL (ref 3.5–5.2)
ALP SERPL-CCNC: 185 U/L (ref 35–104)
ALT SERPL-CCNC: 35 U/L (ref 5–33)
AST SERPL-CCNC: 35 U/L
BILIRUB DIRECT SERPL-MCNC: 0.2 MG/DL
BILIRUB INDIRECT SERPL-MCNC: 0.6 MG/DL (ref 0–1)
BILIRUB SERPL-MCNC: 0.8 MG/DL (ref 0.3–1.2)
GLUCOSE BLD-MCNC: 118 MG/DL (ref 65–105)
GLUCOSE BLD-MCNC: 140 MG/DL (ref 65–105)
GLUCOSE BLD-MCNC: 148 MG/DL (ref 65–105)
GLUCOSE BLD-MCNC: 158 MG/DL (ref 65–105)
PROT SERPL-MCNC: 6.5 G/DL (ref 6.4–8.3)

## 2024-04-20 PROCEDURE — 99232 SBSQ HOSP IP/OBS MODERATE 35: CPT | Performed by: INTERNAL MEDICINE

## 2024-04-20 PROCEDURE — 82947 ASSAY GLUCOSE BLOOD QUANT: CPT

## 2024-04-20 PROCEDURE — 6360000002 HC RX W HCPCS: Performed by: NURSE PRACTITIONER

## 2024-04-20 PROCEDURE — 6370000000 HC RX 637 (ALT 250 FOR IP): Performed by: INTERNAL MEDICINE

## 2024-04-20 PROCEDURE — 6370000000 HC RX 637 (ALT 250 FOR IP): Performed by: NURSE PRACTITIONER

## 2024-04-20 PROCEDURE — 1180000000 HC REHAB R&B

## 2024-04-20 PROCEDURE — 97110 THERAPEUTIC EXERCISES: CPT

## 2024-04-20 PROCEDURE — 97530 THERAPEUTIC ACTIVITIES: CPT

## 2024-04-20 PROCEDURE — 97116 GAIT TRAINING THERAPY: CPT

## 2024-04-20 PROCEDURE — 80076 HEPATIC FUNCTION PANEL: CPT

## 2024-04-20 PROCEDURE — 6370000000 HC RX 637 (ALT 250 FOR IP): Performed by: PHYSICAL MEDICINE & REHABILITATION

## 2024-04-20 PROCEDURE — 36415 COLL VENOUS BLD VENIPUNCTURE: CPT

## 2024-04-20 RX ADMIN — CEPHALEXIN 250 MG: 250 CAPSULE ORAL at 05:14

## 2024-04-20 RX ADMIN — METFORMIN HYDROCHLORIDE 1000 MG: 1000 TABLET, FILM COATED ORAL at 11:06

## 2024-04-20 RX ADMIN — OXYCODONE HYDROCHLORIDE 5 MG: 5 TABLET ORAL at 09:28

## 2024-04-20 RX ADMIN — CEPHALEXIN 250 MG: 250 CAPSULE ORAL at 23:33

## 2024-04-20 RX ADMIN — OXYCODONE HYDROCHLORIDE 5 MG: 5 TABLET ORAL at 05:11

## 2024-04-20 RX ADMIN — ENOXAPARIN SODIUM 30 MG: 100 INJECTION SUBCUTANEOUS at 21:08

## 2024-04-20 RX ADMIN — ACETAMINOPHEN 650 MG: 325 TABLET ORAL at 05:11

## 2024-04-20 RX ADMIN — ACETAMINOPHEN 650 MG: 325 TABLET ORAL at 21:08

## 2024-04-20 RX ADMIN — METFORMIN HYDROCHLORIDE 1000 MG: 1000 TABLET, FILM COATED ORAL at 15:33

## 2024-04-20 RX ADMIN — ATORVASTATIN CALCIUM 10 MG: 10 TABLET, FILM COATED ORAL at 21:09

## 2024-04-20 RX ADMIN — METHOCARBAMOL 500 MG: 500 TABLET ORAL at 11:06

## 2024-04-20 RX ADMIN — METHOCARBAMOL 500 MG: 500 TABLET ORAL at 21:08

## 2024-04-20 RX ADMIN — OXYCODONE HYDROCHLORIDE 5 MG: 5 TABLET ORAL at 15:32

## 2024-04-20 RX ADMIN — CEPHALEXIN 250 MG: 250 CAPSULE ORAL at 17:31

## 2024-04-20 RX ADMIN — LEVOTHYROXINE SODIUM 100 MCG: 0.1 TABLET ORAL at 05:11

## 2024-04-20 RX ADMIN — ENOXAPARIN SODIUM 30 MG: 100 INJECTION SUBCUTANEOUS at 11:06

## 2024-04-20 RX ADMIN — CEPHALEXIN 250 MG: 250 CAPSULE ORAL at 11:08

## 2024-04-20 RX ADMIN — POLYETHYLENE GLYCOL 3350 17 G: 17 POWDER, FOR SOLUTION ORAL at 11:07

## 2024-04-20 RX ADMIN — GABAPENTIN 100 MG: 100 CAPSULE ORAL at 11:06

## 2024-04-20 RX ADMIN — ACETAMINOPHEN 650 MG: 325 TABLET ORAL at 13:39

## 2024-04-20 RX ADMIN — METHOCARBAMOL 500 MG: 500 TABLET ORAL at 13:39

## 2024-04-20 RX ADMIN — GABAPENTIN 100 MG: 100 CAPSULE ORAL at 21:08

## 2024-04-20 RX ADMIN — OXYCODONE HYDROCHLORIDE 5 MG: 5 TABLET ORAL at 23:32

## 2024-04-20 ASSESSMENT — PAIN - FUNCTIONAL ASSESSMENT
PAIN_FUNCTIONAL_ASSESSMENT: ACTIVITIES ARE NOT PREVENTED
PAIN_FUNCTIONAL_ASSESSMENT: ACTIVITIES ARE NOT PREVENTED
PAIN_FUNCTIONAL_ASSESSMENT: PREVENTS OR INTERFERES SOME ACTIVE ACTIVITIES AND ADLS

## 2024-04-20 ASSESSMENT — PAIN SCALES - GENERAL
PAINLEVEL_OUTOF10: 4
PAINLEVEL_OUTOF10: 2
PAINLEVEL_OUTOF10: 4

## 2024-04-20 ASSESSMENT — PAIN DESCRIPTION - DESCRIPTORS
DESCRIPTORS: ACHING

## 2024-04-20 ASSESSMENT — PAIN DESCRIPTION - ORIENTATION
ORIENTATION: LEFT

## 2024-04-20 ASSESSMENT — PAIN DESCRIPTION - LOCATION
LOCATION: LEG

## 2024-04-20 NOTE — PLAN OF CARE
Problem: Discharge Planning  Goal: Discharge to home or other facility with appropriate resources  Outcome: Progressing  Flowsheets (Taken 4/20/2024 1010)  Discharge to home or other facility with appropriate resources:   Identify barriers to discharge with patient and caregiver   Arrange for needed discharge resources and transportation as appropriate   Identify discharge learning needs (meds, wound care, etc)     Problem: Safety - Adult  Goal: Free from fall injury  Outcome: Progressing     Problem: Skin/Tissue Integrity  Goal: Absence of new skin breakdown  Description: 1.  Monitor for areas of redness and/or skin breakdown  2.  Assess vascular access sites hourly  3.  Every 4-6 hours minimum:  Change oxygen saturation probe site  4.  Every 4-6 hours:  If on nasal continuous positive airway pressure, respiratory therapy assess nares and determine need for appliance change or resting period.  Outcome: Progressing     Problem: ABCDS Injury Assessment  Goal: Absence of physical injury  Outcome: Progressing  Flowsheets (Taken 4/20/2024 1411)  Absence of Physical Injury: Implement safety measures based on patient assessment     Problem: Pain  Goal: Verbalizes/displays adequate comfort level or baseline comfort level  Outcome: Progressing     Problem: Nutrition Deficit:  Goal: Optimize nutritional status  Outcome: Progressing

## 2024-04-20 NOTE — PLAN OF CARE
Problem: Discharge Planning  Goal: Discharge to home or other facility with appropriate resources  4/19/2024 2340 by Kourtney Ramirez RN  Outcome: Progressing     Problem: Safety - Adult  Goal: Free from fall injury  4/19/2024 2340 by Kourtney Ramirez RN  Outcome: Progressing     Problem: Skin/Tissue Integrity  Goal: Absence of new skin breakdown  Description: 1.  Monitor for areas of redness and/or skin breakdown  2.  Assess vascular access sites hourly  3.  Every 4-6 hours minimum:  Change oxygen saturation probe site  4.  Every 4-6 hours:  If on nasal continuous positive airway pressure, respiratory therapy assess nares and determine need for appliance change or resting period.  4/19/2024 2340 by Kourtney Ramirez RN  Outcome: Progressing     Problem: ABCDS Injury Assessment  Goal: Absence of physical injury  4/19/2024 2340 by Kourtney Ramirez, RN  Outcome: Progressing     Problem: Pain  Goal: Verbalizes/displays adequate comfort level or baseline comfort level  4/19/2024 2340 by Kourtney Ramirez RN  Outcome: Progressing     Problem: Nutrition Deficit:  Goal: Optimize nutritional status  4/19/2024 2340 by Kourtney Ramirez, RN  Outcome: Progressing

## 2024-04-20 NOTE — PROGRESS NOTES
[facility]   IN-PATIENT SERVICE   [facility]    Progress note            Date:   2024  Patient name:  Dariana Juan  Date of admission:  2024  2:37 PM  MRN:   [id number]  Account:  [id number]  YOB: 1955  PCP:    Kathryn Ortiz APRN - CNP  Room:   [address] [place] [address]  Code Status:    Full Code    Chief Complaint:     Rehab    History Obtained From:     patient    History of Present Illness:     The patient is a 68 y.o.  Non- / non  female who presents with No chief complaint on file.   and she is admitted to the rehab facility for therapy.    68-year-old female with history of osteoporosis, has been on bisphosphonates for several years now, with recommendations to switch to Prolia as per her primary physician given poor response to bisphosphonates, initially presented to outside hospital with left-sided femur fracture.  This was while she was playing hockey with her grandson.  While there she was also noted to have a right sided stress fracture.    Her past medical history significant for melanoma, BRCA gene mutation.    2024  Patient doing well, reports was able to tolerate therapy, no dizziness      Past Medical History:     Past Medical History:   Diagnosis Date    BRCA1 gene mutation positive     BRCA2 gene mutation positive     Cancer (HCC)     melonoma, left arm, melanoma back of neck        Past Surgical History:     Past Surgical History:   Procedure Laterality Date    BLADDER SUSPENSION      prolapse repair    BREAST BIOPSY      three titaanium markers in left breast      SECTION      CHOLECYSTECTOMY      COLONOSCOPY      EMG          FEMUR COMFORT NAIL INSERTION  2024    FEMUR COMFORT NAIL INSERTION Right 04/10/2024    PROPHYLACTIC INTRAMEDULLARY NAIL INSERTION FEMUR    HYSTERECTOMY (CERVIX STATUS UNKNOWN)      has lone ovary    THYROID SURGERY      TIBIA FRACTURE SURGERY Left 2024    FEMORAL INTRAMEDULLARY NAIL  MD Janis        Allergies:     Iodinated contrast media and Penicillins    Social History:     Tobacco:    reports that she has never smoked. She has never used smokeless tobacco.  Alcohol:      has no history on file for alcohol use.  Drug Use:  has no history on file for drug use.    Family History:     Family History   Problem Relation Age of Onset    Breast Cancer Mother 64    Breast Cancer Maternal Grandmother 70    Breast Cancer Paternal Grandmother 70       Review of Systems:     Positive and Negative as described in HPI.    CONSTITUTIONAL:  negative for fevers, chills, sweats, fatigue, weight loss  HEENT:  negative for vision, hearing changes, runny nose, throat pain  RESPIRATORY:  negative for shortness of breath, cough, congestion, wheezing.  CARDIOVASCULAR:  negative for chest pain, palpitations.  GASTROINTESTINAL:  negative for nausea, vomiting, diarrhea, constipation, change in bowel habits, abdominal pain   GENITOURINARY:  negative for difficulty of urination, burning with urination, frequency   INTEGUMENT:  negative for rash, skin lesions, easy bruising   HEMATOLOGIC/LYMPHATIC:  negative for swelling/edema   ALLERGIC/IMMUNOLOGIC:  negative for urticaria , itching  ENDOCRINE:  negative increase in drinking, increase in urination, hot or cold intolerance  MUSCULOSKELETAL: Pain  NEUROLOGICAL:  negative for headaches, dizziness, lightheadedness, numbness, pain, tingling extremities  BEHAVIOR/PSYCH:  negative for depression, anxiety    Physical Exam:   /69   Pulse 88   Temp 98.2 °F (36.8 °C)   Resp 16   Ht 1.727 m (5' 7.99\")   Wt 62.1 kg (136 lb 12.8 oz)   SpO2 100%   BMI 20.81 kg/m²   Temp (24hrs), Av.2 °F (36.8 °C), Min:98.2 °F (36.8 °C), Max:98.2 °F (36.8 °C)    Recent Labs     24  1946 24  0639 24  1130 24  1531   POCGLU 141* 118* 158* 148*       Intake/Output Summary (Last 24 hours) at 2024 1600  Last data filed at 2024 0516  Gross per 24 hour

## 2024-04-20 NOTE — PROGRESS NOTES
Physical Medicine & Rehabilitation  Progress Note    4/20/2024 12:39 PM     CC: Ambulatory and ADL dysfunction due to  left subtrochanteric femur fracture right femoral shaft stress fracture status post IM nailing bilaterally     Subjective:   Feels well.  Pain controlled at rest. Still on ATB for UTI , B/B ok ,. Last BM this AM.    ROS:  Denies fevers, chills, sweats.  No chest pain, palpitations, lightheadedness.  Denies coughing, wheezing or shortness of breath.  Denies abdominal pain, nausea, diarrhea or constipation.  No new areas of joint pain.  Denies new areas of numbness or weakness.  Denies new anxiety or depression issues.  No new skin problems.    Rehabilitation:     PT:      Functional Mobility  Bed mobility  Bed Mobility Comments: did not occur; patient approached/departed in wc     Transfers  Sit to Stand: Moderate Assistance  Stand to Sit: Contact guard assistance (patient instructed to advance left LE slightly transitioning from stand>sit to decrease pain spikes)  Stand Pivot Transfers: Contact guard assistance (with RW)  Comment: transfers completed with RW     Balance  Posture: Good  Sitting - Static: Fair;+  Sitting - Dynamic: Fair;+  Standing - Static: Poor  Standing - Dynamic: Poor     Environmental Mobility  Ambulation  Surface: Level tile  Device: Rolling Walker  Other Apparatus: Wheelchair follow (2nd person for wc follow)  Assistance: Minimal assistance  Quality of Gait: NBOS, small stiff leg steps, heavy reliacne on UEs, downward gaze  Gait Deviations: Slow Michelle;Decreased step length;Decreased step height  Distance: 122'  Comments: competed in AM  More Ambulation?: Yes     Ambulation 2  Surface - 2: level tile  Device 2: Rolling Walker  Other Apparatus 2: Wheelchair follow (2nd person for wc follow)  Assistance 2: Contact guard assistance  Quality of Gait 2: NBOS, small stiff leg steps, heavy reliacne on UEs, downward gaze  Gait Deviations: Slow Michelle;Decreased step length;Decreased  dressing  Pain - oxycodone -hold on increasing oxycodone due to decreased blood pressure scheduled Tylenol, decrease Robaxin, discussed scheduled oxycodone-patient would prefer as needed  Question orthostatic hypotension-decreased blood pressure on standing-decreased Robaxin, Flomax placed on hold, , Ace wrap bilateral lower extremity,  abdominal binder, patient not on blood pressure medications (lisinopril was discontinued)-discussed with internal medicine avoid Florinef due to osteoporosis, workup in progress-appears to be improving  Osteoporosis was on biphosphonate's felt to be cause of fracture  Mild hyponatremia-sodium 134  Thrombocytopenia-improved 130-188  Urinary retention informed PVR over 750 Quintero replaced urology consulted await follow-up, UTI started on antibiotics CNS noted, changed to Keflex- appreciate urology follow-up continue Quintero catheter until more ambulatory  ARLYN-improved creatinine 0.7  Blood loss anemia status post transfusion-hemoglobin improved 9.1  LFT-mild increase in ALT/AST-monitor .  Hyperlipidemia-Lipitor  Hypothyroid-levothyroxine  Bowel program-MiraLAX, senna Colace last BM 4/18  DVT prophylaxis-EPC/Lovenox  Internal medicine for medical management      GHANSHYAM SCOTT MD

## 2024-04-20 NOTE — PROGRESS NOTES
Physical Therapy  Facility/Department: [facility] ACUTE REHAB  Rehabilitation Physical Therapy Progress Note.    NAME: Dariana Juan  : 1955 (68 y.o.)  MRN: [id number]  CODE STATUS: Full Code    Date of Service: 24      Past Medical History:   Diagnosis Date    BRCA1 gene mutation positive     BRCA2 gene mutation positive     Cancer (HCC)     melonoma, left arm, melanoma back of neck     Past Surgical History:   Procedure Laterality Date    BLADDER SUSPENSION      prolapse repair    BREAST BIOPSY      three titaanium markers in left breast      SECTION      CHOLECYSTECTOMY      COLONOSCOPY      EMG          FEMUR COMFORT NAIL INSERTION  2024    FEMUR COMFORT NAIL INSERTION Right 04/10/2024    PROPHYLACTIC INTRAMEDULLARY NAIL INSERTION FEMUR    HYSTERECTOMY (CERVIX STATUS UNKNOWN)      has lone ovary    THYROID SURGERY      TIBIA FRACTURE SURGERY Left 2024    FEMORAL INTRAMEDULLARY NAIL INSERTION performed by Patricio Caruso DO at [facility] OR    TIBIA FRACTURE SURGERY Right 4/10/2024    PROPHYLACTIC INTRAMEDULLARY NAIL INSERTION FEMUR performed by Patricio Caruso DO at [facility] OR            Restrictions:  Restrictions/Precautions: General Precautions;Fall Risk;Weight Bearing  Lower Extremity Weight Bearing Restrictions  Right Lower Extremity Weight Bearing: Weight Bearing As Tolerated  Left Lower Extremity Weight Bearing: Weight Bearing As Tolerated  Position Activity Restriction  Other position/activity restrictions: Left subtrochanteric femur fracture s/p IMN, DOS: 24 - Right femoral shaft stress fracture s/p IMN, DOS: 4/10/24     SUBJECTIVE  Pain: 8/10 pain L Hip           OBJECTIVE  Vision  Vision: Impaired  Vision Exceptions: Wears glasses at all times  Tracking: Able to track stimulus in all quads w/o difficulty         Cognition  Arousal/Alertness: Appropriate responses to stimuli  Problem Solving: Assistance required to generate solutions;Assistance required to implement

## 2024-04-21 LAB
ANION GAP SERPL CALCULATED.3IONS-SCNC: 9 MMOL/L (ref 9–17)
BASOPHILS # BLD: 0 K/UL (ref 0–0.2)
BASOPHILS NFR BLD: 0 % (ref 0–2)
BUN SERPL-MCNC: 20 MG/DL (ref 8–23)
CALCIUM SERPL-MCNC: 8.6 MG/DL (ref 8.6–10.4)
CHLORIDE SERPL-SCNC: 100 MMOL/L (ref 98–107)
CO2 SERPL-SCNC: 26 MMOL/L (ref 20–31)
CREAT SERPL-MCNC: 0.8 MG/DL (ref 0.5–0.9)
EOSINOPHIL # BLD: 0.1 K/UL (ref 0–0.4)
EOSINOPHILS RELATIVE PERCENT: 2 % (ref 0–4)
ERYTHROCYTE [DISTWIDTH] IN BLOOD BY AUTOMATED COUNT: 15.2 % (ref 11.5–14.9)
GFR SERPL CREATININE-BSD FRML MDRD: 81 ML/MIN/1.73M2
GLUCOSE BLD-MCNC: 116 MG/DL (ref 65–105)
GLUCOSE BLD-MCNC: 123 MG/DL (ref 65–105)
GLUCOSE BLD-MCNC: 143 MG/DL (ref 65–105)
GLUCOSE BLD-MCNC: 151 MG/DL (ref 65–105)
GLUCOSE SERPL-MCNC: 143 MG/DL (ref 70–99)
HCT VFR BLD AUTO: 28.5 % (ref 36–46)
HGB BLD-MCNC: 9.1 G/DL (ref 12–16)
LYMPHOCYTES NFR BLD: 1.3 K/UL (ref 1–4.8)
LYMPHOCYTES RELATIVE PERCENT: 16 % (ref 24–44)
MCH RBC QN AUTO: 29.3 PG (ref 26–34)
MCHC RBC AUTO-ENTMCNC: 31.9 G/DL (ref 31–37)
MCV RBC AUTO: 91.7 FL (ref 80–100)
MONOCYTES NFR BLD: 0.5 K/UL (ref 0.1–1.3)
MONOCYTES NFR BLD: 7 % (ref 1–7)
NEUTROPHILS NFR BLD: 75 % (ref 36–66)
NEUTS SEG NFR BLD: 6.3 K/UL (ref 1.3–9.1)
PLATELET # BLD AUTO: 284 K/UL (ref 150–450)
PMV BLD AUTO: 10.1 FL (ref 6–12)
POTASSIUM SERPL-SCNC: 4.6 MMOL/L (ref 3.7–5.3)
RBC # BLD AUTO: 3.11 M/UL (ref 4–5.2)
SODIUM SERPL-SCNC: 135 MMOL/L (ref 135–144)
WBC OTHER # BLD: 8.3 K/UL (ref 3.5–11)

## 2024-04-21 PROCEDURE — 6370000000 HC RX 637 (ALT 250 FOR IP): Performed by: PHYSICAL MEDICINE & REHABILITATION

## 2024-04-21 PROCEDURE — 97116 GAIT TRAINING THERAPY: CPT

## 2024-04-21 PROCEDURE — 85025 COMPLETE CBC W/AUTO DIFF WBC: CPT

## 2024-04-21 PROCEDURE — 6370000000 HC RX 637 (ALT 250 FOR IP): Performed by: INTERNAL MEDICINE

## 2024-04-21 PROCEDURE — 6360000002 HC RX W HCPCS: Performed by: NURSE PRACTITIONER

## 2024-04-21 PROCEDURE — 80048 BASIC METABOLIC PNL TOTAL CA: CPT

## 2024-04-21 PROCEDURE — 97530 THERAPEUTIC ACTIVITIES: CPT

## 2024-04-21 PROCEDURE — 36415 COLL VENOUS BLD VENIPUNCTURE: CPT

## 2024-04-21 PROCEDURE — 1180000000 HC REHAB R&B

## 2024-04-21 PROCEDURE — 82947 ASSAY GLUCOSE BLOOD QUANT: CPT

## 2024-04-21 PROCEDURE — 6370000000 HC RX 637 (ALT 250 FOR IP): Performed by: NURSE PRACTITIONER

## 2024-04-21 PROCEDURE — 97535 SELF CARE MNGMENT TRAINING: CPT

## 2024-04-21 PROCEDURE — 97110 THERAPEUTIC EXERCISES: CPT

## 2024-04-21 PROCEDURE — 99232 SBSQ HOSP IP/OBS MODERATE 35: CPT | Performed by: INTERNAL MEDICINE

## 2024-04-21 RX ADMIN — CEPHALEXIN 250 MG: 250 CAPSULE ORAL at 12:10

## 2024-04-21 RX ADMIN — METHOCARBAMOL 500 MG: 500 TABLET ORAL at 14:16

## 2024-04-21 RX ADMIN — METFORMIN HYDROCHLORIDE 1000 MG: 1000 TABLET, FILM COATED ORAL at 08:08

## 2024-04-21 RX ADMIN — ENOXAPARIN SODIUM 30 MG: 100 INJECTION SUBCUTANEOUS at 21:24

## 2024-04-21 RX ADMIN — LEVOTHYROXINE SODIUM 100 MCG: 0.1 TABLET ORAL at 05:47

## 2024-04-21 RX ADMIN — OXYCODONE HYDROCHLORIDE 5 MG: 5 TABLET ORAL at 12:10

## 2024-04-21 RX ADMIN — ATORVASTATIN CALCIUM 10 MG: 10 TABLET, FILM COATED ORAL at 21:24

## 2024-04-21 RX ADMIN — CEPHALEXIN 250 MG: 250 CAPSULE ORAL at 06:34

## 2024-04-21 RX ADMIN — METHOCARBAMOL 500 MG: 500 TABLET ORAL at 21:23

## 2024-04-21 RX ADMIN — METHOCARBAMOL 500 MG: 500 TABLET ORAL at 08:08

## 2024-04-21 RX ADMIN — ACETAMINOPHEN 650 MG: 325 TABLET ORAL at 21:24

## 2024-04-21 RX ADMIN — ACETAMINOPHEN 650 MG: 325 TABLET ORAL at 05:47

## 2024-04-21 RX ADMIN — CEPHALEXIN 250 MG: 250 CAPSULE ORAL at 16:57

## 2024-04-21 RX ADMIN — OXYCODONE HYDROCHLORIDE 5 MG: 5 TABLET ORAL at 08:07

## 2024-04-21 RX ADMIN — ACETAMINOPHEN 650 MG: 325 TABLET ORAL at 14:16

## 2024-04-21 RX ADMIN — ENOXAPARIN SODIUM 30 MG: 100 INJECTION SUBCUTANEOUS at 08:08

## 2024-04-21 RX ADMIN — GABAPENTIN 100 MG: 100 CAPSULE ORAL at 21:23

## 2024-04-21 RX ADMIN — CEPHALEXIN 250 MG: 250 CAPSULE ORAL at 23:56

## 2024-04-21 RX ADMIN — GABAPENTIN 100 MG: 100 CAPSULE ORAL at 08:07

## 2024-04-21 RX ADMIN — METFORMIN HYDROCHLORIDE 1000 MG: 1000 TABLET, FILM COATED ORAL at 16:56

## 2024-04-21 RX ADMIN — OXYCODONE HYDROCHLORIDE 5 MG: 5 TABLET ORAL at 21:24

## 2024-04-21 ASSESSMENT — PAIN SCALES - GENERAL
PAINLEVEL_OUTOF10: 1
PAINLEVEL_OUTOF10: 4
PAINLEVEL_OUTOF10: 4
PAINLEVEL_OUTOF10: 0
PAINLEVEL_OUTOF10: 0
PAINLEVEL_OUTOF10: 2
PAINLEVEL_OUTOF10: 4

## 2024-04-21 ASSESSMENT — PAIN DESCRIPTION - DESCRIPTORS
DESCRIPTORS: ACHING
DESCRIPTORS: ACHING

## 2024-04-21 ASSESSMENT — PAIN DESCRIPTION - FREQUENCY
FREQUENCY: CONTINUOUS
FREQUENCY: CONTINUOUS

## 2024-04-21 ASSESSMENT — PAIN - FUNCTIONAL ASSESSMENT
PAIN_FUNCTIONAL_ASSESSMENT: PREVENTS OR INTERFERES SOME ACTIVE ACTIVITIES AND ADLS
PAIN_FUNCTIONAL_ASSESSMENT: PREVENTS OR INTERFERES SOME ACTIVE ACTIVITIES AND ADLS

## 2024-04-21 ASSESSMENT — PAIN DESCRIPTION - ONSET: ONSET: ON-GOING

## 2024-04-21 ASSESSMENT — PAIN DESCRIPTION - ORIENTATION
ORIENTATION: LEFT

## 2024-04-21 ASSESSMENT — PAIN DESCRIPTION - LOCATION
LOCATION: LEG

## 2024-04-21 ASSESSMENT — PAIN DESCRIPTION - PAIN TYPE
TYPE: ACUTE PAIN;SURGICAL PAIN
TYPE: ACUTE PAIN;SURGICAL PAIN

## 2024-04-21 ASSESSMENT — PAIN SCALES - WONG BAKER: WONGBAKER_NUMERICALRESPONSE: HURTS EVEN MORE

## 2024-04-21 NOTE — PROGRESS NOTES
[facility]   Acute Rehabilitation Occupational Therapy Daily Treatment Note    Date: 24  Patient Name: Dariana Juan       Room: 2639/2639-01  MRN: [id number]  Account: [id number]   : 1955  (68 y.o.) Gender: female       Referring Practitioner: Aaron Rodrigez MD  Diagnosis: Left subtrochanteric femur fracture and right femoral stress fracture status post IM nailing-felt to be related biphosphonate  Additional Pertinent Hx: Ms. Dariana Juan is a 68 y.o. female who was admitted to [facility] on 2024 with Fall and Dislocation. 68-year-old female admitted [facility] for left thigh pain and deformity-noted playing hockey with her grandson and fell on her left leg she had immediate pain found to have left femur fracture and baseline lives with her spouse is independent spouse has cancer and is unable provide much assistance.  Patient takes biphosphonate's for osteoporosis and neuropathy of unknown origin. Ortho-left subtrochanteric femur fracture status post IMN and right femoral shaft stress fracture plan to go to the OR/10/24 for treatment right femoral shaft stress fracture weightbearing as tolerated left lower extremity nonweightbearing right lower extremity. Felt to be biphosphonate related fracture. Trauma-as above. Pt admitted to ARU on 2024.    Treatment Diagnosis: Impaired self-care status    Past Medical History:  has a past medical history of BRCA1 gene mutation positive, BRCA2 gene mutation positive, and Cancer (HCC).    Past Surgical History:   has a past surgical history that includes Breast biopsy;  section; Hysterectomy; Thyroid surgery; Cholecystectomy; Colonoscopy; bladder suspension; EMG; Femur Sarah nail insertion (2024); Tibia fracture surgery (Left, 2024); Femur Sarah nail insertion (Right, 04/10/2024); and Tibia fracture surgery (Right, 4/10/2024).    Restrictions  Restrictions/Precautions  Restrictions/Precautions:

## 2024-04-21 NOTE — PROGRESS NOTES
[facility]   IN-PATIENT SERVICE   [facility]    Progress note            Date:   2024  Patient name:  Dariana Juan  Date of admission:  2024  2:37 PM  MRN:   [id number]  Account:  [id number]  YOB: 1955  PCP:    Kathryn Ortiz APRN - CNP  Room:   [address] [place] [address]  Code Status:    Full Code    Chief Complaint:     Rehab    History Obtained From:     patient    History of Present Illness:     The patient is a 68 y.o.  Non- / non  female who presents with No chief complaint on file.   and she is admitted to the rehab facility for therapy.    68-year-old female with history of osteoporosis, has been on bisphosphonates for several years now, with recommendations to switch to Prolia as per her primary physician given poor response to bisphosphonates, initially presented to outside hospital with left-sided femur fracture.  This was while she was playing hockey with her grandson.  While there she was also noted to have a right sided stress fracture.    Her past medical history significant for melanoma, BRCA gene mutation.    2024  Patient doing well, reports was able to tolerate therapy, no dizziness      Past Medical History:     Past Medical History:   Diagnosis Date    BRCA1 gene mutation positive     BRCA2 gene mutation positive     Cancer (HCC)     melonoma, left arm, melanoma back of neck        Past Surgical History:     Past Surgical History:   Procedure Laterality Date    BLADDER SUSPENSION      prolapse repair    BREAST BIOPSY      three titaanium markers in left breast      SECTION      CHOLECYSTECTOMY      COLONOSCOPY      EMG          FEMUR COMFORT NAIL INSERTION  2024    FEMUR COMFORT NAIL INSERTION Right 04/10/2024    PROPHYLACTIC INTRAMEDULLARY NAIL INSERTION FEMUR    HYSTERECTOMY (CERVIX STATUS UNKNOWN)      has lone ovary    THYROID SURGERY      TIBIA FRACTURE SURGERY Left 2024    FEMORAL INTRAMEDULLARY NAIL  Time: 04/21/24  7:14 AM   Result Value Ref Range    WBC 8.3 3.5 - 11.0 k/uL    RBC 3.11 (L) 4.0 - 5.2 m/uL    Hemoglobin 9.1 (L) 12.0 - 16.0 g/dL    Hematocrit 28.5 (L) 36 - 46 %    MCV 91.7 80 - 100 fL    MCH 29.3 26 - 34 pg    MCHC 31.9 31 - 37 g/dL    RDW 15.2 (H) 11.5 - 14.9 %    Platelets 284 150 - 450 k/uL    MPV 10.1 6.0 - 12.0 fL    Neutrophils % 75 (H) 36 - 66 %    Lymphocytes % 16 (L) 24 - 44 %    Monocytes % 7 1 - 7 %    Eosinophils % 2 0 - 4 %    Basophils % 0 0 - 2 %    Neutrophils Absolute 6.30 1.3 - 9.1 k/uL    Lymphocytes Absolute 1.30 1.0 - 4.8 k/uL    Monocytes Absolute 0.50 0.1 - 1.3 k/uL    Eosinophils Absolute 0.10 0.0 - 0.4 k/uL    Basophils Absolute 0.00 0.0 - 0.2 k/uL   POC Glucose Fingerstick    Collection Time: 04/21/24 12:03 PM   Result Value Ref Range    POC Glucose 116 (H) 65 - 105 mg/dL       Imaging/Diagnostics:    Reviewed    Assessment :      Primary Problem  Closed left hip fracture, sequela    Active Hospital Problems    Diagnosis Date Noted    Orthostatic hypotension [I95.1] 04/14/2024    Closed left hip fracture, sequela [S72.002S] 04/13/2024       Plan:     Patient status Admit as inpatient in the rehab    Left femur fracture, s/p IM nailing 4/8/2024  Right femoral stress fracture s/p IM nailing 4/10/2024  Orthostatic hypotension, resolved as per numbers checked today however patient reports that he she did get hypotensive.  For now continue compression stockings and abdominal binder.  Workup for adrenal insufficiency negative.  ACTH levels within normal limits  Urine cultures growing E. coli, cultures reviewed.  Will switch to Keflex to complete a total of 10-day course  Osteoporosis, workup and management as per outpatient  Urinary retention, currently with a Quintero.  Tamsulosin held due to orthostatic hypotension  Prediabetes, blood sugars controlled  Normocytic normochromic anemia, no recent labs on the patient.  Back in 2017 hemoglobin was normal.  Currently with no evidence

## 2024-04-21 NOTE — PROGRESS NOTES
Physical Therapy  Facility/Department: [facility] ACUTE REHAB  Rehabilitation Physical Therapy Progress Note.  NAME: Dariana Juan  : 1955 (68 y.o.)  MRN: [id number]  CODE STATUS: Full Code    Date of Service: 24      Past Medical History:   Diagnosis Date    BRCA1 gene mutation positive     BRCA2 gene mutation positive     Cancer (HCC)     melonoma, left arm, melanoma back of neck     Past Surgical History:   Procedure Laterality Date    BLADDER SUSPENSION      prolapse repair    BREAST BIOPSY      three titaanium markers in left breast      SECTION      CHOLECYSTECTOMY      COLONOSCOPY      EMG          FEMUR COMFORT NAIL INSERTION  2024    FEMUR COMFORT NAIL INSERTION Right 04/10/2024    PROPHYLACTIC INTRAMEDULLARY NAIL INSERTION FEMUR    HYSTERECTOMY (CERVIX STATUS UNKNOWN)      has lone ovary    THYROID SURGERY      TIBIA FRACTURE SURGERY Left 2024    FEMORAL INTRAMEDULLARY NAIL INSERTION performed by Patricio Caruso DO at [facility] OR    TIBIA FRACTURE SURGERY Right 4/10/2024    PROPHYLACTIC INTRAMEDULLARY NAIL INSERTION FEMUR performed by Patricio Caruso DO at [facility] OR            Restrictions:  Restrictions/Precautions: General Precautions;Fall Risk;Weight Bearing  Lower Extremity Weight Bearing Restrictions  Right Lower Extremity Weight Bearing: Weight Bearing As Tolerated  Left Lower Extremity Weight Bearing: Weight Bearing As Tolerated  Position Activity Restriction  Other position/activity restrictions: Left subtrochanteric femur fracture s/p IMN, DOS: 24 - Right femoral shaft stress fracture s/p IMN, DOS: 4/10/24     SUBJECTIVE  Pain: no c/o pain         OBJECTIVE          Cognition  Arousal/Alertness: Appropriate responses to stimuli  Problem Solving: Assistance required to generate solutions;Assistance required to implement solutions  Insights: Fully aware of deficits  Initiation: Requires cues for some  Sequencing: Requires cues for some  Cognition Comment: Pt is  Complexity  Discharge Recommendations: Patient would benefit from continued therapy after discharge          GOALS  Patient Goals   Patient Goals : To return home  Short Term Goals  Time Frame for Short Term Goals: 1week  Short Term Goal 1: Pt to complete bed mobility with min assist using compensatory strategies to manage legs on/off bed with  Short Term Goal 2: Pt tolerate  unsupported sitting for static and dynamic activity ie ADL, anf therex with stable vital signs with SBA  Short Term Goal 3: increase Bilat knee ROM 10-20 deg via positioning and gentle stretching  Short Term Goal 4: tolerate standing in fabiola stedy vs // bars vs RW with min assist x2 with stable vital signs  Short Term Goal 5: pt to perform sit pivot vs slide board transfers bed to/from mobility chair vs w/c with min x2  Long Term Goals  Time Frame for Long Term Goals : LOS  Long Term Goal 1: pt to perform sit tostand transfers with RW with mod assist  Long Term Goal 3: Pt toincrease BLE strength to 3+/5 as demonstrated by Sit tostands and  Long Term Goal 4: ambulate with RW with min asssit x2, 20-50 ft  Long Term Goal 5: complete family education regrading home modifications, body mechanincs to assists pt safely  with transfers and mobility  Long term goal 6: car transfers at mod A  Long term goal 7: pt able to go up and down 2 to 4 steps with UE support, mod A x 1 to 2 person for safety    PLAN OF CARE  Frequency: 1-2 treatment sessions per day, 5-7 days per week  Physical Therapy Plan  General Plan:  minutes of therapy at least 5 out of 7 days a week  Specific Instructions for Next Treatment: monitor vitals progress as tolerated.  Current Treatment Recommendations: Strengthening;Balance training;Functional mobility training;Transfer training;Endurance training;Gait training;Stair training;Neuromuscular re-education;Home exercise program;Safety education & training;Therapeutic activities    EDUCATION  Education  Education Given To:

## 2024-04-21 NOTE — PLAN OF CARE
Problem: Discharge Planning  Goal: Discharge to home or other facility with appropriate resources  4/20/2024 2321 by Kourtney Ramirez, RN  Outcome: Progressing  Flowsheets (Taken 4/20/2024 2313)  Discharge to home or other facility with appropriate resources:   Identify barriers to discharge with patient and caregiver   Arrange for needed discharge resources and transportation as appropriate   Identify discharge learning needs (meds, wound care, etc)     Problem: Safety - Adult  Goal: Free from fall injury  4/20/2024 2321 by Kourtney Ramirez, RN  Outcome: Progressing     Problem: Skin/Tissue Integrity  Goal: Absence of new skin breakdown  Description: 1.  Monitor for areas of redness and/or skin breakdown  2.  Assess vascular access sites hourly  3.  Every 4-6 hours minimum:  Change oxygen saturation probe site  4.  Every 4-6 hours:  If on nasal continuous positive airway pressure, respiratory therapy assess nares and determine need for appliance change or resting period.  4/20/2024 2321 by Kourtney Ramirez, RN  Outcome: Progressing     Problem: ABCDS Injury Assessment  Goal: Absence of physical injury  4/20/2024 2321 by Kourtney Ramirez, RN  Outcome: Progressing     Problem: Pain  Goal: Verbalizes/displays adequate comfort level or baseline comfort level  4/20/2024 2321 by Kourtney Ramirez RN  Outcome: Progressing     Problem: Nutrition Deficit:  Goal: Optimize nutritional status  4/20/2024 2321 by Kourtney Ramirez, RN  Outcome: Progressing

## 2024-04-21 NOTE — PROGRESS NOTES
Physical Medicine & Rehabilitation  Progress Note    4/21/2024 11:53 AM     CC: Ambulatory and ADL dysfunction due to  left subtrochanteric femur fracture right femoral shaft stress fracture status post IM nailing bilaterally     Subjective:   Feels well.  Pain controlled . Still on ATB for UTI , B/B ok. Last BM this AM.    ROS:  Denies fevers, chills, sweats.  No chest pain, palpitations, lightheadedness.  Denies coughing, wheezing or shortness of breath.  Denies abdominal pain, nausea, diarrhea or constipation.  No new areas of joint pain.  Denies new areas of numbness or weakness.  Denies new anxiety or depression issues.  No new skin problems.    Rehabilitation:     PT:      Functional Mobility  Bed mobility  Rolling to Left: Stand by assistance (use of rail)  Rolling to Right: Stand by assistance (use of rail)  Supine to Sit: Stand by assistance (w/o rail education for technique/hand placement, additional time/increased effort.)  Sit to Supine: Stand by assistance (education for techinque/handplacement x 2 reps.)  Scooting: Stand by assistance     Transfers  Sit to Stand: Contact guard assistance (additional time c effort,flexed knee's initially education to correct.)  Stand to Sit: Contact guard assistance     Environmental Mobility  Ambulation  Surface: Level tile  Device: Rolling Walker  Other Apparatus: Wheelchair follow  Assistance: Contact guard assistance  Quality of Gait: NBOS,  Gait Deviations: Decreased step length;Decreased step height (step to gait pattern, education for step through gait pattern, heavy reliance of UE's, downward gaze.)  Distance: 107 ft x2, 70 ft in p.m.     Propulsion 1  Propulsion: Manual  Level: Level Tile  Method: TOMMY  Level of Assistance: Modified independent  Description/ Details: good steady pace, completes turns/corners safely  Distance: 150 ft.    OT    Activities of Daily Living  Feeding  Assistance Level: Independent  Skilled Clinical Factors: per pt report

## 2024-04-22 LAB
GLUCOSE BLD-MCNC: 114 MG/DL (ref 65–105)
GLUCOSE BLD-MCNC: 121 MG/DL (ref 65–105)
GLUCOSE BLD-MCNC: 127 MG/DL (ref 65–105)
GLUCOSE BLD-MCNC: 132 MG/DL (ref 65–105)

## 2024-04-22 PROCEDURE — 1180000000 HC REHAB R&B

## 2024-04-22 PROCEDURE — 6370000000 HC RX 637 (ALT 250 FOR IP): Performed by: INTERNAL MEDICINE

## 2024-04-22 PROCEDURE — 6370000000 HC RX 637 (ALT 250 FOR IP): Performed by: NURSE PRACTITIONER

## 2024-04-22 PROCEDURE — 99232 SBSQ HOSP IP/OBS MODERATE 35: CPT | Performed by: PHYSICAL MEDICINE & REHABILITATION

## 2024-04-22 PROCEDURE — 6360000002 HC RX W HCPCS: Performed by: NURSE PRACTITIONER

## 2024-04-22 PROCEDURE — 6370000000 HC RX 637 (ALT 250 FOR IP): Performed by: PHYSICAL MEDICINE & REHABILITATION

## 2024-04-22 PROCEDURE — 97535 SELF CARE MNGMENT TRAINING: CPT

## 2024-04-22 PROCEDURE — 82947 ASSAY GLUCOSE BLOOD QUANT: CPT

## 2024-04-22 PROCEDURE — 97116 GAIT TRAINING THERAPY: CPT

## 2024-04-22 PROCEDURE — 99232 SBSQ HOSP IP/OBS MODERATE 35: CPT | Performed by: INTERNAL MEDICINE

## 2024-04-22 PROCEDURE — 97110 THERAPEUTIC EXERCISES: CPT

## 2024-04-22 PROCEDURE — 97530 THERAPEUTIC ACTIVITIES: CPT

## 2024-04-22 RX ADMIN — CEPHALEXIN 250 MG: 250 CAPSULE ORAL at 17:55

## 2024-04-22 RX ADMIN — LEVOTHYROXINE SODIUM 100 MCG: 0.1 TABLET ORAL at 05:12

## 2024-04-22 RX ADMIN — OXYCODONE HYDROCHLORIDE 5 MG: 5 TABLET ORAL at 13:42

## 2024-04-22 RX ADMIN — OXYCODONE HYDROCHLORIDE 5 MG: 5 TABLET ORAL at 09:12

## 2024-04-22 RX ADMIN — ENOXAPARIN SODIUM 30 MG: 100 INJECTION SUBCUTANEOUS at 21:46

## 2024-04-22 RX ADMIN — ENOXAPARIN SODIUM 30 MG: 100 INJECTION SUBCUTANEOUS at 07:56

## 2024-04-22 RX ADMIN — CEPHALEXIN 250 MG: 250 CAPSULE ORAL at 05:09

## 2024-04-22 RX ADMIN — GABAPENTIN 100 MG: 100 CAPSULE ORAL at 21:46

## 2024-04-22 RX ADMIN — METHOCARBAMOL 500 MG: 500 TABLET ORAL at 13:37

## 2024-04-22 RX ADMIN — CEPHALEXIN 250 MG: 250 CAPSULE ORAL at 13:38

## 2024-04-22 RX ADMIN — ACETAMINOPHEN 650 MG: 325 TABLET ORAL at 13:37

## 2024-04-22 RX ADMIN — GABAPENTIN 100 MG: 100 CAPSULE ORAL at 07:55

## 2024-04-22 RX ADMIN — METHOCARBAMOL 500 MG: 500 TABLET ORAL at 21:46

## 2024-04-22 RX ADMIN — METFORMIN HYDROCHLORIDE 1000 MG: 1000 TABLET, FILM COATED ORAL at 07:55

## 2024-04-22 RX ADMIN — METHOCARBAMOL 500 MG: 500 TABLET ORAL at 07:55

## 2024-04-22 RX ADMIN — METFORMIN HYDROCHLORIDE 1000 MG: 1000 TABLET, FILM COATED ORAL at 17:55

## 2024-04-22 RX ADMIN — OXYCODONE HYDROCHLORIDE 5 MG: 5 TABLET ORAL at 05:09

## 2024-04-22 RX ADMIN — OXYCODONE HYDROCHLORIDE 5 MG: 5 TABLET ORAL at 21:46

## 2024-04-22 RX ADMIN — ACETAMINOPHEN 650 MG: 325 TABLET ORAL at 21:46

## 2024-04-22 RX ADMIN — ACETAMINOPHEN 650 MG: 325 TABLET ORAL at 05:09

## 2024-04-22 RX ADMIN — ATORVASTATIN CALCIUM 10 MG: 10 TABLET, FILM COATED ORAL at 21:46

## 2024-04-22 ASSESSMENT — PAIN DESCRIPTION - LOCATION
LOCATION: HIP
LOCATION: LEG;KNEE
LOCATION: LEG
LOCATION: LEG
LOCATION: LEG;HIP;KNEE

## 2024-04-22 ASSESSMENT — PAIN DESCRIPTION - DESCRIPTORS
DESCRIPTORS: ACHING;THROBBING
DESCRIPTORS: ACHING
DESCRIPTORS: ACHING;SHARP

## 2024-04-22 ASSESSMENT — PAIN SCALES - GENERAL
PAINLEVEL_OUTOF10: 1
PAINLEVEL_OUTOF10: 2
PAINLEVEL_OUTOF10: 4
PAINLEVEL_OUTOF10: 3
PAINLEVEL_OUTOF10: 4
PAINLEVEL_OUTOF10: 7
PAINLEVEL_OUTOF10: 4
PAINLEVEL_OUTOF10: 2
PAINLEVEL_OUTOF10: 2

## 2024-04-22 ASSESSMENT — PAIN - FUNCTIONAL ASSESSMENT
PAIN_FUNCTIONAL_ASSESSMENT: PREVENTS OR INTERFERES SOME ACTIVE ACTIVITIES AND ADLS
PAIN_FUNCTIONAL_ASSESSMENT: ACTIVITIES ARE NOT PREVENTED
PAIN_FUNCTIONAL_ASSESSMENT: ACTIVITIES ARE NOT PREVENTED

## 2024-04-22 ASSESSMENT — PAIN DESCRIPTION - ORIENTATION
ORIENTATION: LEFT
ORIENTATION: RIGHT;LEFT
ORIENTATION: LEFT

## 2024-04-22 NOTE — PROGRESS NOTES
[facility]   IN-PATIENT SERVICE   [facility]    Progress note            Date:   2024  Patient name:  Dariana Juan  Date of admission:  2024  2:37 PM  MRN:   [id number]  Account:  [id number]  YOB: 1955  PCP:    Kathryn Ortiz APRN - CNP  Room:   [address] [place] [address]  Code Status:    Full Code    Chief Complaint:     Rehab    History Obtained From:     patient    History of Present Illness:     The patient is a 68 y.o.  Non- / non  female who presents with No chief complaint on file.   and she is admitted to the rehab facility for therapy.    68-year-old female with history of osteoporosis, has been on bisphosphonates for several years now, with recommendations to switch to Prolia as per her primary physician given poor response to bisphosphonates, initially presented to outside hospital with left-sided femur fracture.  This was while she was playing hockey with her grandson.  While there she was also noted to have a right sided stress fracture.    Her past medical history significant for melanoma, BRCA gene mutation.    2024  Patient doing well, reports was able to tolerate therapy, no dizziness      Past Medical History:     Past Medical History:   Diagnosis Date    BRCA1 gene mutation positive     BRCA2 gene mutation positive     Cancer (HCC)     melonoma, left arm, melanoma back of neck        Past Surgical History:     Past Surgical History:   Procedure Laterality Date    BLADDER SUSPENSION      prolapse repair    BREAST BIOPSY      three titaanium markers in left breast      SECTION      CHOLECYSTECTOMY      COLONOSCOPY      EMG          FEMUR COMFORT NAIL INSERTION  2024    FEMUR COMFORT NAIL INSERTION Right 04/10/2024    PROPHYLACTIC INTRAMEDULLARY NAIL INSERTION FEMUR    HYSTERECTOMY (CERVIX STATUS UNKNOWN)      has lone ovary    THYROID SURGERY      TIBIA FRACTURE SURGERY Left 2024    FEMORAL INTRAMEDULLARY NAIL  care not provided in a hospital setting.    Virgie Samuel MD  4/22/2024  4:03 PM    Copy sent to Dr. Ortiz, Kathryn MADDEN, APRN - CNP    Please note that this chart was generated using voice recognition Dragon dictation software.  Although every effort was made to ensure the accuracy of this automated transcription, some errors in transcription may have occurred.

## 2024-04-22 NOTE — PLAN OF CARE
Problem: Discharge Planning  Goal: Discharge to home or other facility with appropriate resources  4/21/2024 2342 by Kourtney Ramirez, RN  Outcome: Progressing  Flowsheets (Taken 4/21/2024 2300)  Discharge to home or other facility with appropriate resources:   Identify barriers to discharge with patient and caregiver   Arrange for needed discharge resources and transportation as appropriate   Identify discharge learning needs (meds, wound care, etc)     Problem: Safety - Adult  Goal: Free from fall injury  4/21/2024 2342 by Kourtney Ramirez, RN  Outcome: Progressing     Problem: Skin/Tissue Integrity  Goal: Absence of new skin breakdown  Description: 1.  Monitor for areas of redness and/or skin breakdown  2.  Assess vascular access sites hourly  3.  Every 4-6 hours minimum:  Change oxygen saturation probe site  4.  Every 4-6 hours:  If on nasal continuous positive airway pressure, respiratory therapy assess nares and determine need for appliance change or resting period.  4/21/2024 2342 by Kourtney Ramirez, RN  Outcome: Progressing     Problem: ABCDS Injury Assessment  Goal: Absence of physical injury  4/21/2024 2342 by Kourtney Ramirez, RN  Outcome: Progressing     Problem: Pain  Goal: Verbalizes/displays adequate comfort level or baseline comfort level  4/21/2024 2342 by Kourtney Ramirez, RN  Outcome: Progressing     Problem: Nutrition Deficit:  Goal: Optimize nutritional status  4/21/2024 2342 by Kourtney Ramirez, RN  Outcome: Progressing

## 2024-04-22 NOTE — CARE COORDINATION
ARU CASE MANAGEMENT NOTE:    Admission Date:  4/13/2024 Dariana Juan is a 68 y.o.  female    Admitted for : Closed left hip fracture, sequela [S72.002S]    Patient is alert and oriented x4.    Spoke with patient regarding discharge plan: Plan remains to discharge home with spouse and [facility]. States she feels therapy is going well.     Outside appointments while in ARU: 4/25 - Dr. Caruso - [phone]    DME: no order at this time    Will continue to follow for additional discharge needs.    Electronically signed by Estefani Mcbride RN on 4/22/2024 at 4:13 PM

## 2024-04-22 NOTE — PATIENT CARE CONFERENCE
[facility]  TEAM CONFERENCE NOTE  Date: 24  Patient Name: Dariana Juan       Room: 2639/2639-01  MRN: [id number]       : 1955  (68 y.o.)     Gender: female     Referring Practitioner: Rain     PRINCIPAL DIAGNOSIS: Closed left hip fracture, sequela    NURSING    Bladder Continence  Not Applicable - Device in Use (Indwelling Catheter, Condom Catheter or Ostomy)  Bowel Continence Always Continent    Date of Last BM: 2024    Bladder/Bowel Program Interventions: Both Bowel & Bladder Program In Place     Wounds/Incisions/Ulcers: Incision healing well    Pain Control: Patient's pain currently controlled and pain regimen effective as ordered    Pain Medication Regimen Usage Pattern: MAR reviewed and pain medications are being used at the following frequency (Specify Medication, # Doses Administered on average per day, identified patterns of use - for example: time of day, prior to activity/therapy)  Oxycodone 5mg  taking prior to therapy and at HS   with encouragement     Fall Risk:  Falling star program initiated    Medication Education Program: Patient able to manage medications and being educated by nursing    Discharge Preparation Patient/Responsible Party Education In Progress:   Wound Care and Urinary Catheter Management    Nursing specific communication for TEAM: No additional information identified requiring communication at this time    PHYSICAL THERAPY    Bed mobility  Supine to Sit: Stand by assistance (no bedrail)  Sit to Supine: Stand by assistance (no bed rail)  Scooting: Stand by assistance  Bed Mobility Comments: See ZHANG jane note for bed mobility note. See ZHANG jane note for bed mobility note. Verbal from ZHANG, Supine to sit with supervision  Bed Mobility  Scooting: Stand by assistance      Transfers:  Sit to Stand: Contact guard assistance (increased time to complete task, education to straighten (B) knee's.)  Stand to Sit: Contact guard

## 2024-04-22 NOTE — INTERDISCIPLINARY ROUNDS
[facility] Rehabilitation  INTERDISCIPLINARY MEETING  Date: 24  Patient Name: Dariana Juan       Room: 2639/2639-01  MRN: [id number]       : 1955  (68 y.o.)     Gender: female     Patient's care team, including nursing, speech language pathologist, occupational therapist, and/or physical therapist, met to discuss patient's care needs. Any patient concerns, change in medical status, and current transfer/mobility status were identified at this time.     Any Additional Pertinent Information:   Transfers status updated to 1 A with RW    Participating Team Members:  Nurse: Claudia Fischer LPN    Occupational Therapist: Rhonda Santa OT   Physical Therapist: Beth Hilton PT  Speech Therapist:  N/A

## 2024-04-22 NOTE — PROGRESS NOTES
7-10 days  Short Term Goal 1: Pt will perform UB bathing/dressing set-up with Good safety  Short Term Goal 2: Pt will perform LB bathing/dressing Mod A with Good safety and use of AE/modified techniques as needed  Short Term Goal 3: Pt will perform functional transfers Mod A during self-care tasks with Good safety and use of least restrictive device  Short Term Goal 4: Pt will tolerate sitting EOB 10+ minutes during functional activity of choice SBA to improve dynamic balance and reaching outside JUAN for participation in self-care tasks  Short Term Goal 5: Pt will verbalize/demonstrate Good understanding of AE/DME/modified techniques to promote ease and independence with self-care tasks  Short Term Goal 6: Pt will verbalize/demonstrate Good understanding of 2-3 non-pharmaceutical pain management strategies to increase participation in self-care tasks and to improve quality of life  Short Term Goal 7: Pt will verbalize/demonstrate Good understanding of EC/WS strategies to increase participation and safety during daily activities  Short Term Goal 8: Pt will actively participate in 30+ minutes of therapeutic exercise/functional activity/social participation to promote safety and independence with self-care tasks and to improve overall quality of life    Long Term Goals  Time Frame for Long Term Goals : By discharge  Long Term Goal 1: Pt will perform UB bathing/dressing Mod I with Good safety  Long Term Goal 2: Pt will perform LB bathing/dressing Min A with Good safety and use of AE/modified techniques as needed  Long Term Goal 3: Pt will perform functional transfers/mobility Min A during self-care tasks with Good safety and use of least restrictive device  Long Term Goal 4: Pt will tolerate standing 8+ minutes during functional activity of choice Min A to improve balance/tolerance for self-care tasks  Long Term Goal 5: Pt will perform tub transfer Min A with Good safety and appropriate use of AE/DME as needed  Long  Term Goal 6: Pt will verbalize/demonstrate Good understanding of home safety/fall prevention strategies to promote safety and independence with daily activities  Long Term Goal 7: (Goal met with pt EVANGELINA Suero OTLUIS/L 4/15/24) OT to further assess fine motor control/coordination using the 9 hole peg test (inform OTR when complete to update goal)  Long Term Goal 8: (Goal met with pt EVANGELINA Suero OTLUIS/L 4/15/24) OT to further assess hand/ strength using the hand dynamometer (Inform OTR when complete to update goal)    Plan  Occupational Therapy Plan  Times Per Week: 5-7  Times Per Day: Twice a day  Current Treatment Recommendations: Strengthening, Functional mobility training, Balance training, Endurance training, Pain management, Safety education & training, Patient/Caregiver education & training, Equipment evaluation, education, & procurement, Self-Care / ADL, Home management training, Co-Treatment         04/22/24 0801 04/22/24 1301   OT Individual Minutes   Time In 0801 1301   Time Out 0911 1329   Minutes 70 28         Electronically signed by NICO Lentz on 4/22/24 at 1:46 PM EDT

## 2024-04-22 NOTE — PROGRESS NOTES
Physical Medicine & Rehabilitation  Progress Note      Subjective:      68 year-old female with L subtrochanteric femur fracture, and R femoral shaft stress fracture. Patient is having problems with pain in the BLEs, requiring pain medications    ROS:  Denies fevers, chills, sweats.  No chest pain, palpitations, lightheadedness.  Denies coughing, wheezing or shortness of breath.  Denies abdominal pain, nausea, diarrhea or constipation.  No new areas of joint pain.  Denies new areas of numbness or weakness.  Denies new anxiety or depression issues.  No new skin problems.    Rehabilitation:       PT:    Bed mobility  Bridging: Dependent/Total (use of mechanical bed)  Rolling to Left: Stand by assistance (use of rail)  Rolling to Right: Stand by assistance (use of rail)  Supine to Sit: Stand by assistance (no bedrail)  Sit to Supine: Stand by assistance (no bed rail)  Scooting: Stand by assistance  Bed Mobility Comments: See ZHANG jane note for bed mobility note. See ZHANG ajne note for bed mobility note. Verbal from ZHANG, Supine to sit with supervision         Transfers  Sit to Stand: Contact guard assistance (increased time to complete task, education to straighten (B) knee's.)  Stand to Sit: Contact guard assistance, Stand by assistance  Bed to Chair: 2 Person Assistance, Dependent/Total (use of fabiola stedy)  Stand Pivot Transfers: Contact guard assistance (with RW)  Comment: transfers completed with RW         Ambulation  Surface: Level tile  Device: Rolling Walker  Other Apparatus: Wheelchair follow  Assistance: Contact guard assistance  Quality of Gait: Initially presented with step to gait pattern, after cues pt able to fluctuate between step to and step throug gait pattern, R hip hike present  Gait Deviations: Decreased step length, Decreased step height (patient fluctuates from reciprocal to step to gait pattern c decreased knee flexion (B))  Distance: 230 ft x 2, standing rest break after inital 230, seated

## 2024-04-22 NOTE — PROGRESS NOTES
Occupational Therapy    Patients goals updated in following review of progress notes and collaboration/communication with intra-disciplinary team members. Patient continues to make progress with skilled interventions. Please see updated long term goals below:      04/22/24 1617   Long Term Goals   Time Frame for Long Term Goals  By discharge   Long Term Goal 1 Pt will perform UB bathing/dressing Mod I with Good safety   Long Term Goal 2 Pt will perform LB bathing/dressing Mod I with Good safety and use of AE/modified techniques as needed (Goal updated by Ivonne Miranda OTR/L on 4/22/2024)   Long Term Goal 3 Pt will perform functional transfers/mobility Mod I during self-care tasks with Good safety and use of least restrictive device (Goal updated by Ivonne Miranda OTR/L on 4/22/2024)   Long Term Goal 4 Pt will tolerate standing 8+ minutes during functional activity of choice Mod I to improve balance/tolerance for self-care tasks (Goal updated by Ivonne Miranda OTR/L on 4/22/2024)   Long Term Goal 5 Pt will perform tub transfer SUP with Good safety and appropriate use of AE/DME as needed (Goal updated by Ivonne Miranda OTR/L on 4/22/2024)   Long Term Goal 6 Pt will verbalize/demonstrate Good understanding of home safety/fall prevention strategies to promote safety and independence with daily activities   Long Term Goal 7 (Goal met with pt EVANGELINA Suero OTR/L 4/15/24) OT to further assess fine motor control/coordination using the 9 hole peg test (inform OTR when complete to update goal)   Long Term Goal 8 (Goal met with pt EVANGELINA Suero OTR/L 4/15/24) OT to further assess hand/ strength using the hand dynamometer (Inform OTR when complete to update goal)       Ivonne Miranda OTR/L   4/22/2024

## 2024-04-22 NOTE — PROGRESS NOTES
Physical Therapy  Facility/Department: [facility]  Rehabilitation Physical Therapy Note    NAME: Dariana Juan  : 1955 (68 y.o.)  MRN: [id number]  CODE STATUS: Full Code    Date of Service: 24      Past Medical History:   Diagnosis Date    BRCA1 gene mutation positive     BRCA2 gene mutation positive     Cancer (HCC)     melonoma, left arm, melanoma back of neck     Past Surgical History:   Procedure Laterality Date    BLADDER SUSPENSION      prolapse repair    BREAST BIOPSY      three titaanium markers in left breast      SECTION      CHOLECYSTECTOMY      COLONOSCOPY      EMG          FEMUR COMFORT NAIL INSERTION  2024    FEMUR COMFORT NAIL INSERTION Right 04/10/2024    PROPHYLACTIC INTRAMEDULLARY NAIL INSERTION FEMUR    HYSTERECTOMY (CERVIX STATUS UNKNOWN)      has lone ovary    THYROID SURGERY      TIBIA FRACTURE SURGERY Left 2024    FEMORAL INTRAMEDULLARY NAIL INSERTION performed by Patricio Caruso DO at [facility]    TIBIA FRACTURE SURGERY Right 4/10/2024    PROPHYLACTIC INTRAMEDULLARY NAIL INSERTION FEMUR performed by Patricio Caruso DO at [facility]       Chart Reviewed: Yes  Patient assessed for rehabilitation services?: Yes  Additional Pertinent Hx: recent removal of melnoma L upper arm in March  Family / Caregiver Present: No  Referring Practitioner: Rain  Referral Date : 24  Diagnosis: bilateral femur fxs with ORIF  Other (Comment): Pt is awake and alert in agreement with PT intevention, Pt with Hgb 6.9 okay per RN to see with activity as tolerated.  General Comment  Comments: Patient with increased edema & swelling of left thigh noted.    Restrictions:  Restrictions/Precautions: General Precautions;Fall Risk;Weight Bearing  Lower Extremity Weight Bearing Restrictions  Right Lower Extremity Weight Bearing: Weight Bearing As Tolerated  Left Lower Extremity Weight Bearing: Weight Bearing As Tolerated  Position Activity Restriction  Other position/activity  within reach;Chair alarm in place;Gait belt;Patient at risk for falls;Left in chair    EDUCATION  Education  Education Given To: Patient  Education Provided: Plan of Care;Precautions;Safety;Energy Conservation;Fall Prevention Strategies;Mobility Training  Education Provided Comments: Education provided for gait training and safety.  Education Method: Demonstration;Verbal  Barriers to Learning: None  Education Outcome: Verbalized understanding;Demonstrated understanding;Continued education needed         Therapy Time   Individual Concurrent Group Co-treatment   Time In 0959         Time Out 1134         Minutes 95                   KAMERON Dickerson, 04/22/24 at 12:00 PM   The above documentation and treatment completed by Student Physical Therapist Assistant (SPTA)  was provided under the supervision in the direct line of sight and documented by the student, was reviewed and accepted by supervising Clinical Instructor CINDY Romano.

## 2024-04-23 LAB
GLUCOSE BLD-MCNC: 108 MG/DL (ref 65–105)
GLUCOSE BLD-MCNC: 122 MG/DL (ref 65–105)
GLUCOSE BLD-MCNC: 126 MG/DL (ref 65–105)
OSMOLALITY UR: 249 MOSM/KG (ref 80–1300)
SODIUM UR-SCNC: 20 MMOL/L

## 2024-04-23 PROCEDURE — 84300 ASSAY OF URINE SODIUM: CPT

## 2024-04-23 PROCEDURE — 6370000000 HC RX 637 (ALT 250 FOR IP): Performed by: INTERNAL MEDICINE

## 2024-04-23 PROCEDURE — 1180000000 HC REHAB R&B

## 2024-04-23 PROCEDURE — 97535 SELF CARE MNGMENT TRAINING: CPT

## 2024-04-23 PROCEDURE — 99232 SBSQ HOSP IP/OBS MODERATE 35: CPT | Performed by: INTERNAL MEDICINE

## 2024-04-23 PROCEDURE — 97110 THERAPEUTIC EXERCISES: CPT

## 2024-04-23 PROCEDURE — 6360000002 HC RX W HCPCS: Performed by: NURSE PRACTITIONER

## 2024-04-23 PROCEDURE — 6370000000 HC RX 637 (ALT 250 FOR IP): Performed by: PHYSICAL MEDICINE & REHABILITATION

## 2024-04-23 PROCEDURE — 83935 ASSAY OF URINE OSMOLALITY: CPT

## 2024-04-23 PROCEDURE — 99233 SBSQ HOSP IP/OBS HIGH 50: CPT | Performed by: PHYSICAL MEDICINE & REHABILITATION

## 2024-04-23 PROCEDURE — 97530 THERAPEUTIC ACTIVITIES: CPT

## 2024-04-23 PROCEDURE — 6370000000 HC RX 637 (ALT 250 FOR IP): Performed by: NURSE PRACTITIONER

## 2024-04-23 PROCEDURE — 82947 ASSAY GLUCOSE BLOOD QUANT: CPT

## 2024-04-23 PROCEDURE — 97116 GAIT TRAINING THERAPY: CPT

## 2024-04-23 RX ADMIN — METHOCARBAMOL 500 MG: 500 TABLET ORAL at 13:10

## 2024-04-23 RX ADMIN — CEPHALEXIN 250 MG: 250 CAPSULE ORAL at 13:14

## 2024-04-23 RX ADMIN — CEPHALEXIN 250 MG: 250 CAPSULE ORAL at 17:42

## 2024-04-23 RX ADMIN — ENOXAPARIN SODIUM 30 MG: 100 INJECTION SUBCUTANEOUS at 08:29

## 2024-04-23 RX ADMIN — GABAPENTIN 100 MG: 100 CAPSULE ORAL at 21:24

## 2024-04-23 RX ADMIN — METFORMIN HYDROCHLORIDE 1000 MG: 1000 TABLET, FILM COATED ORAL at 08:29

## 2024-04-23 RX ADMIN — OXYCODONE HYDROCHLORIDE 5 MG: 5 TABLET ORAL at 13:14

## 2024-04-23 RX ADMIN — METHOCARBAMOL 500 MG: 500 TABLET ORAL at 08:29

## 2024-04-23 RX ADMIN — OXYCODONE HYDROCHLORIDE 5 MG: 5 TABLET ORAL at 05:08

## 2024-04-23 RX ADMIN — LEVOTHYROXINE SODIUM 100 MCG: 0.1 TABLET ORAL at 05:08

## 2024-04-23 RX ADMIN — ACETAMINOPHEN 650 MG: 325 TABLET ORAL at 13:09

## 2024-04-23 RX ADMIN — CEPHALEXIN 250 MG: 250 CAPSULE ORAL at 05:08

## 2024-04-23 RX ADMIN — GABAPENTIN 100 MG: 100 CAPSULE ORAL at 08:29

## 2024-04-23 RX ADMIN — ATORVASTATIN CALCIUM 10 MG: 10 TABLET, FILM COATED ORAL at 21:24

## 2024-04-23 RX ADMIN — OXYCODONE HYDROCHLORIDE 5 MG: 5 TABLET ORAL at 09:21

## 2024-04-23 RX ADMIN — ACETAMINOPHEN 650 MG: 325 TABLET ORAL at 05:08

## 2024-04-23 RX ADMIN — OXYCODONE HYDROCHLORIDE 5 MG: 5 TABLET ORAL at 21:24

## 2024-04-23 RX ADMIN — ENOXAPARIN SODIUM 30 MG: 100 INJECTION SUBCUTANEOUS at 21:23

## 2024-04-23 RX ADMIN — METFORMIN HYDROCHLORIDE 1000 MG: 1000 TABLET, FILM COATED ORAL at 17:42

## 2024-04-23 RX ADMIN — METHOCARBAMOL 500 MG: 500 TABLET ORAL at 21:24

## 2024-04-23 RX ADMIN — ACETAMINOPHEN 650 MG: 325 TABLET ORAL at 21:23

## 2024-04-23 ASSESSMENT — PAIN DESCRIPTION - ORIENTATION
ORIENTATION: LEFT
ORIENTATION: LEFT
ORIENTATION: RIGHT;LEFT
ORIENTATION: RIGHT;LEFT
ORIENTATION: LEFT

## 2024-04-23 ASSESSMENT — PAIN DESCRIPTION - LOCATION
LOCATION: LEG
LOCATION: HIP
LOCATION: LEG
LOCATION: LEG
LOCATION: HIP
LOCATION: HIP

## 2024-04-23 ASSESSMENT — PAIN SCALES - GENERAL
PAINLEVEL_OUTOF10: 7
PAINLEVEL_OUTOF10: 4
PAINLEVEL_OUTOF10: 2
PAINLEVEL_OUTOF10: 4
PAINLEVEL_OUTOF10: 5
PAINLEVEL_OUTOF10: 4
PAINLEVEL_OUTOF10: 3
PAINLEVEL_OUTOF10: 6
PAINLEVEL_OUTOF10: 3
PAINLEVEL_OUTOF10: 2
PAINLEVEL_OUTOF10: 2

## 2024-04-23 ASSESSMENT — PAIN DESCRIPTION - DESCRIPTORS
DESCRIPTORS: ACHING;THROBBING
DESCRIPTORS: ACHING

## 2024-04-23 ASSESSMENT — PAIN - FUNCTIONAL ASSESSMENT
PAIN_FUNCTIONAL_ASSESSMENT: ACTIVITIES ARE NOT PREVENTED

## 2024-04-23 ASSESSMENT — PAIN SCALES - WONG BAKER: WONGBAKER_NUMERICALRESPONSE: NO HURT

## 2024-04-23 NOTE — PLAN OF CARE
Problem: Discharge Planning  Goal: Discharge to home or other facility with appropriate resources  Outcome: Progressing     Problem: Safety - Adult  Goal: Free from fall injury  Outcome: Progressing     Problem: Skin/Tissue Integrity  Goal: Absence of new skin breakdown  Description: 1.  Monitor for areas of redness and/or skin breakdown  Outcome: Progressing     Problem: ABCDS Injury Assessment  Goal: Absence of physical injury  Outcome: Progressing     Problem: Pain  Goal: Verbalizes/displays adequate comfort level or baseline comfort level  Outcome: Progressing     Problem: Nutrition Deficit:  Goal: Optimize nutritional status  Outcome: Progressing

## 2024-04-23 NOTE — PROGRESS NOTES
resolved as per numbers checked today however patient reports that he she did get hypotensive.  For now continue compression stockings and abdominal binder.  Workup for adrenal insufficiency negative.  ACTH levels within normal limits  Urine cultures growing E. coli, cultures reviewed.  Will switch to Keflex to complete a total of 10-day course  Osteoporosis, workup and management as per outpatient  Urinary retention, currently with a Quintero.  Tamsulosin held due to orthostatic hypotension  Prediabetes, blood sugars controlled  Normocytic normochromic anemia, no recent labs on the patient.  Back in 2017 hemoglobin was normal.  Currently with no evidence of bleeding.  Monitor for bleeding  Mild hyponatremia, recommend monitoring for now.  Patient euvolemic on exam  Elevated alk phos levels, liver source however no symptoms.  Monitor for now      4/19  Patient seen and examined  Vitals have been stable  Blood sugar has been stable  Urine culture growing E. coli, on Keflex  Progressing with PT OT  Denies any dizziness lightheadedness  Not using IV line, okay for it to come out'      4/20  Patient seen and examined  Vitals have been stable  Labs satisfactory  Mild transaminitis, no abdominal symptoms, continue to monitor recommend outpatient evaluation  Blood sugar has been stable  Progressing with PT  /23  Patient seen and examined  Vitals have been stable, blood sugar has been stable  Patient progressing with PT OT.  Pain controlled  On Keflex for UTI.  Consultations:   IP CONSULT TO DIETITIAN  IP CONSULT TO SOCIAL WORK  IP CONSULT TO INTERNAL MEDICINE  IP CONSULT TO UROLOGY    Patient is admitted as inpatient status because of co-morbidities listed above, severity of signs and symptoms as outlined, requirement for current medical therapies and most importantly because of direct risk to patient if care not provided in a hospital setting.    Virgie Samuel MD  4/23/2024  3:31 PM    Copy sent to Kathryn Buenrostro  J, APRN - CNP    Please note that this chart was generated using voice recognition Dragon dictation software.  Although every effort was made to ensure the accuracy of this automated transcription, some errors in transcription may have occurred.

## 2024-04-23 NOTE — PROGRESS NOTES
[facility]   Acute Rehabilitation Occupational Therapy Daily Treatment Note    Date: 24  Patient Name: Dariana Juan       Room: 2639/2639-01  MRN: [id number]  Account: [id number]   : 1955  (68 y.o.) Gender: female       Referring Practitioner: Aaron Rodrigez MD  Diagnosis: Left subtrochanteric femur fracture and right femoral stress fracture status post IM nailing-felt to be related biphosphonate  Additional Pertinent Hx: Ms. Dariana Juan is a 68 y.o. female who was admitted to [facility] on 2024 with Fall and Dislocation. 68-year-old female admitted [facility] for left thigh pain and deformity-noted playing hockey with her grandson and fell on her left leg she had immediate pain found to have left femur fracture and baseline lives with her spouse is independent spouse has cancer and is unable provide much assistance.  Patient takes biphosphonate's for osteoporosis and neuropathy of unknown origin. Ortho-left subtrochanteric femur fracture status post IMN and right femoral shaft stress fracture plan to go to the OR/10/24 for treatment right femoral shaft stress fracture weightbearing as tolerated left lower extremity nonweightbearing right lower extremity. Felt to be biphosphonate related fracture. Trauma-as above. Pt admitted to ARU on 2024.    Treatment Diagnosis: Impaired self-care status    Past Medical History:  has a past medical history of BRCA1 gene mutation positive, BRCA2 gene mutation positive, and Cancer (HCC).    Past Surgical History:   has a past surgical history that includes Breast biopsy;  section; Hysterectomy; Thyroid surgery; Cholecystectomy; Colonoscopy; bladder suspension; EMG; Femur Sarah nail insertion (2024); Tibia fracture surgery (Left, 2024); Femur Sarah nail insertion (Right, 04/10/2024); and Tibia fracture surgery (Right, 4/10/2024).    Restrictions  Restrictions/Precautions  Restrictions/Precautions:  4/22/2024)  Long Term Goal 5: Pt will perform tub transfer SUP with Good safety and appropriate use of AE/DME as needed (Goal updated by BEATA Carpenter/L on 4/22/2024)  Long Term Goal 6: Pt will verbalize/demonstrate Good understanding of home safety/fall prevention strategies to promote safety and independence with daily activities  Long Term Goal 7: (Goal met with pt EVANGELINA COTA/L 4/15/24) OT to further assess fine motor control/coordination using the 9 hole peg test (inform OTR when complete to update goal)  Long Term Goal 8: (Goal met with pt EVANGELINA Suero OTR/L 4/15/24) OT to further assess hand/ strength using the hand dynamometer (Inform OTR when complete to update goal)    Plan  Occupational Therapy Plan  Times Per Week: 5-7  Times Per Day: Twice a day  Current Treatment Recommendations: Strengthening, Functional mobility training, Balance training, Endurance training, Pain management, Safety education & training, Patient/Caregiver education & training, Equipment evaluation, education, & procurement, Self-Care / ADL, Home management training, Co-Treatment       04/23/24 0801 04/23/24 1442   OT Individual Minutes   Time In 0801 1442   Time Out 0901 1518   Minutes 60 36           Electronically signed by NICO Lentz on 4/23/24 at 3:46 PM EDT

## 2024-04-23 NOTE — PROGRESS NOTES
Comprehensive Nutrition Assessment    Type and Reason for Visit:  Reassess    Nutrition Recommendations/Plan:   Continue current diet.  Continue oral nutrition supplements.      Malnutrition Assessment:  Malnutrition Status:  At risk for malnutrition (Comment) (04/14/24 1040)    Context:  Acute Illness     Findings of the 6 clinical characteristics of malnutrition:  Energy Intake:  No significant decrease in energy intake  Weight Loss:  No significant weight loss     Body Fat Loss:  No significant body fat loss     Muscle Mass Loss:  No significant muscle mass loss    Fluid Accumulation:  Mild Extremities   Strength:  Not Performed    Nutrition Assessment:    Pt states she has been eating well. States she doesn't eat much meat, so she would like to continue Glucerna supplements for additional protein and other nutrients.    Nutrition Related Findings:    Edema: +1 RLE, LLE. Glucophage, Humalog Sliding Scale. POC Glucose: 124, 127. Other meds and labs reviewed. Wound Type: Multiple, Surgical Incision       Current Nutrition Intake & Therapies:    Average Meal Intake: 51-75%, %  Average Supplements Intake: %  ADULT DIET; Regular; 4 carb choices (60 gm/meal)  ADULT ORAL NUTRITION SUPPLEMENT; Breakfast, Lunch, Dinner; Diabetic Oral Supplement    Anthropometric Measures:  Height: 172.7 cm (5' 7.99\")  Ideal Body Weight (IBW): 140 lbs (64 kg)    Admission Body Weight: 67.7 kg (149 lb 4 oz) (method not specified)  Current Body Weight: 62.1 kg (136 lb 14.5 oz), 97.8 % IBW. Weight Source: Bed Scale  Current BMI (kg/m2): 20.8  Usual Body Weight: 61.2 kg (135 lb) (122-135lb per pt)  % Weight Change (Calculated): 1.4                    BMI Categories: Underweight (BMI less than 22) age over 65 (near usual wt for pt)    Estimated Daily Nutrient Needs:  Energy Requirements Based On: Kcal/kg  Weight Used for Energy Requirements: Admission  Energy (kcal/day): 1905 based on 28kcal/kg  Weight Used for Protein

## 2024-04-23 NOTE — PROGRESS NOTES
Physical Therapy  Facility/Department: [facility] ACUTE REHAB  Rehabilitation Physical Therapy Note    NAME: Dariana Juan  : 1955 (68 y.o.)  MRN: [id number]  CODE STATUS: Full Code    Date of Service: 24      Past Medical History:   Diagnosis Date    BRCA1 gene mutation positive     BRCA2 gene mutation positive     Cancer (HCC)     melonoma, left arm, melanoma back of neck     Past Surgical History:   Procedure Laterality Date    BLADDER SUSPENSION      prolapse repair    BREAST BIOPSY      three titaanium markers in left breast      SECTION      CHOLECYSTECTOMY      COLONOSCOPY      EMG          FEMUR COMFORT NAIL INSERTION  2024    FEMUR COMFORT NAIL INSERTION Right 04/10/2024    PROPHYLACTIC INTRAMEDULLARY NAIL INSERTION FEMUR    HYSTERECTOMY (CERVIX STATUS UNKNOWN)      has lone ovary    THYROID SURGERY      TIBIA FRACTURE SURGERY Left 2024    FEMORAL INTRAMEDULLARY NAIL INSERTION performed by Patricio Caruso DO at [facility] OR    TIBIA FRACTURE SURGERY Right 4/10/2024    PROPHYLACTIC INTRAMEDULLARY NAIL INSERTION FEMUR performed by Patricio Caruso DO at [facility] OR       Chart Reviewed: Yes  Patient assessed for rehabilitation services?: Yes  Additional Pertinent Hx: recent removal of melnoma L upper arm in March  Family / Caregiver Present: No  Referring Practitioner: Rain  Referral Date : 24  Diagnosis: bilateral femur fxs with ORIF  Comments: Patient with increased edema & swelling of left thigh noted.    Restrictions:  Restrictions/Precautions: General Precautions;Fall Risk;Weight Bearing  Lower Extremity Weight Bearing Restrictions  Right Lower Extremity Weight Bearing: Weight Bearing As Tolerated  Left Lower Extremity Weight Bearing: Weight Bearing As Tolerated  Position Activity Restriction  Other position/activity restrictions: Left subtrochanteric femur fracture s/p IMN, DOS: 24 - Right femoral shaft stress fracture s/p IMN, DOS: 4/10/24     SUBJECTIVE  Subjective: Pt  x 20 reps, single leg marching 10 reps, Parallel bars (B) marching x 5 reps, SLR x 3 ways (B) LE.  Postural Correction Exercises: verbal cues to stand tall after transfer of sit <> stand and upon inital ambulation.  Breathing Techniques: Continued Pursed lipped breathing control  Exercise Equipment: NuStep @ seat 11 to start, increased to 10 after 1:30 min, seat 9 after 1 minute and seat 8 after 2 min. Total minutes on NuStep 9 min. Pt expressed increased tightness within L LE.    ASSESSMENT       Activity Tolerance  Activity Tolerance: Patient tolerated treatment well;Patient limited by fatigue  Activity Tolerance Comments: Pt tolerated treatment well but expressed increased tightness within L LE during prolonged stretching and NuStep activites. Pain Reported to be 3/10.          GOALS  Patient Goals   Patient Goals : To return home  Short Term Goals  Time Frame for Short Term Goals: 1week  Short Term Goal 1: Pt to complete bed mobility with min assist using compensatory strategies to manage legs on/off bed with  Short Term Goal 2: Pt tolerate  unsupported sitting for static and dynamic activity ie ADL, anf therex with stable vital signs with SBA  Short Term Goal 3: increase Bilat knee ROM 10-20 deg via positioning and gentle stretching  Short Term Goal 4: tolerate standing in fabiola stedy vs // bars vs RW with min assist x2 with stable vital signs  Short Term Goal 5: pt to perform sit pivot vs slide board transfers bed to/from mobility chair vs w/c with min x2  Long Term Goals  Time Frame for Long Term Goals : LOS  Long Term Goal 1: pt to perform sit tostand transfers with RW with mod assist  Long Term Goal 3: Pt toincrease BLE strength to 3+/5 as demonstrated by Sit tostands and  Long Term Goal 4: ambulate with RW with min asssit x2, 20-50 ft  Long Term Goal 5: complete family education regrading home modifications, body mechanincs to assists pt safely  with transfers and mobility  Long term goal 6: car transfers

## 2024-04-23 NOTE — PROGRESS NOTES
Physical Medicine & Rehabilitation  Progress Note      Subjective:      68 year-old female with L subtrochanteric femur fracture, and R femoral shaft stress fracture. Patient is progressing well with therapies. Discharge date adjusted in team meeting today. Her pain is well controlled. She notes that she had urinary frequency prior to admission. She would like void trial prior to discharge.     ROS:  Denies fevers, chills, sweats.  No chest pain, palpitations, lightheadedness.  Denies coughing, wheezing or shortness of breath.  Denies abdominal pain, nausea, diarrhea or constipation.  No new areas of joint pain.  Denies new areas of numbness or weakness.  Denies new anxiety or depression issues.  No new skin problems.    Rehabilitation:       PT:    Bed mobility  Bridging: Dependent/Total (use of mechanical bed)  Rolling to Left: Stand by assistance (use of rail)  Rolling to Right: Stand by assistance (use of rail)  Supine to Sit: Stand by assistance (no bedrail)  Sit to Supine: Stand by assistance (no bed rail)  Scooting: Stand by assistance  Bed Mobility Comments: See ZHANG jane note for bed mobility note. See ZHANG jane note for bed mobility note. Verbal from ZHANG, Supine to sit with supervision         Transfers  Sit to Stand: Contact guard assistance (increased time to complete task, education to straighten (B) knee's.)  Stand to Sit: Contact guard assistance, Stand by assistance  Bed to Chair: 2 Person Assistance, Dependent/Total (use of fabiola stedy)  Stand Pivot Transfers: Contact guard assistance (with RW)  Comment: transfers completed with RW         Ambulation  Surface: Level tile  Device: Rolling Walker  Other Apparatus: Wheelchair follow  Assistance: Contact guard assistance  Quality of Gait: Initially presented with step to gait pattern, after cues pt able to fluctuate between step to and step throug gait pattern, R hip hike present  Gait Deviations: Decreased step length, Decreased step height (patient  for medical management  DVT prophylaxis:  Lovenox, ANNA stockings during the day. EPCs at hs.   Patient's care discussed in interdisciplinary team conference today. Medical decision making high regarding continued inpatient rehabilitation hospitalization and discharge planning. 50 minutes spent today in coordination of care, review of medical records including consulting services management and recommendations, medications, and lab results.       FACE TO FACE EVALUATION    Patient is seen in face to face evaluation today and will require the following durable medical equipment     BATH/SHOWER SEAT MISC    Tub transfer bench    Weight: Weight - Scale: 62.1 kg (136 lb 12.8 oz)  Diagnosis: L hip fracture and R femur fracture  Duration: Purchase     Electronically signed by ZONIA LUZ MD on 4/23/2024 at 10:02 AM      This note is created with the assistance of a speech recognition program.  While intending to generate a document that actually reflects the content of the visit, the document can still have some errors including those of syntax and sound a like substitutions which may escape proof reading.  In such instances, actual meaning can be extrapolated by contextual diversion.

## 2024-04-23 NOTE — PLAN OF CARE
Problem: Discharge Planning  Goal: Discharge to home or other facility with appropriate resources  4/23/2024 1138 by Any Fernandez, RN  Outcome: Progressing   Pt to discharge home once medically cleared.   Problem: Safety - Adult  Goal: Free from fall injury  4/23/2024 1138 by Any Fernandez, RN  Outcome: Progressing   No injury noted this shift.   Problem: Skin/Tissue Integrity  Goal: Absence of new skin breakdown  Description: 1.  Monitor for areas of redness and/or skin breakdown  2.  Assess vascular access sites hourly  3.  Every 4-6 hours minimum:  Change oxygen saturation probe site  4.  Every 4-6 hours:  If on nasal continuous positive airway pressure, respiratory therapy assess nares and determine need for appliance change or resting period.  4/23/2024 1138 by Any Fernandez, RN  Outcome: Progressing   No new skin breakdown noted this shift.   Problem: ABCDS Injury Assessment  Goal: Absence of physical injury  4/23/2024 1138 by Any Fernandez, RN  Outcome: Progressing   No injury noted this shift.   Problem: Pain  Goal: Verbalizes/displays adequate comfort level or baseline comfort level  4/23/2024 1138 by Any Fernandez, RN  Outcome: Progressing   Pt rates pain to LLE elevated. PRN oxycodone effective for pain control.   Problem: Nutrition Deficit:  Goal: Optimize nutritional status  4/23/2024 1138 by Any Fernandez, RN  Outcome: Progressing   Pt tolerating po intake this shift.

## 2024-04-24 LAB
GLUCOSE BLD-MCNC: 106 MG/DL (ref 65–105)
GLUCOSE BLD-MCNC: 109 MG/DL (ref 65–105)
GLUCOSE BLD-MCNC: 110 MG/DL (ref 65–105)
GLUCOSE BLD-MCNC: 112 MG/DL (ref 65–105)

## 2024-04-24 PROCEDURE — 51798 US URINE CAPACITY MEASURE: CPT

## 2024-04-24 PROCEDURE — 97110 THERAPEUTIC EXERCISES: CPT

## 2024-04-24 PROCEDURE — 99232 SBSQ HOSP IP/OBS MODERATE 35: CPT | Performed by: PHYSICAL MEDICINE & REHABILITATION

## 2024-04-24 PROCEDURE — 97116 GAIT TRAINING THERAPY: CPT

## 2024-04-24 PROCEDURE — 97530 THERAPEUTIC ACTIVITIES: CPT

## 2024-04-24 PROCEDURE — 1180000000 HC REHAB R&B

## 2024-04-24 PROCEDURE — 97535 SELF CARE MNGMENT TRAINING: CPT

## 2024-04-24 PROCEDURE — 82947 ASSAY GLUCOSE BLOOD QUANT: CPT

## 2024-04-24 PROCEDURE — 51701 INSERT BLADDER CATHETER: CPT

## 2024-04-24 PROCEDURE — 6370000000 HC RX 637 (ALT 250 FOR IP): Performed by: PHYSICAL MEDICINE & REHABILITATION

## 2024-04-24 PROCEDURE — 99232 SBSQ HOSP IP/OBS MODERATE 35: CPT | Performed by: INTERNAL MEDICINE

## 2024-04-24 PROCEDURE — 6370000000 HC RX 637 (ALT 250 FOR IP): Performed by: NURSE PRACTITIONER

## 2024-04-24 PROCEDURE — 6370000000 HC RX 637 (ALT 250 FOR IP): Performed by: INTERNAL MEDICINE

## 2024-04-24 PROCEDURE — 6360000002 HC RX W HCPCS: Performed by: NURSE PRACTITIONER

## 2024-04-24 PROCEDURE — 97542 WHEELCHAIR MNGMENT TRAINING: CPT

## 2024-04-24 RX ADMIN — LEVOTHYROXINE SODIUM 100 MCG: 0.1 TABLET ORAL at 06:37

## 2024-04-24 RX ADMIN — METHOCARBAMOL 500 MG: 500 TABLET ORAL at 20:22

## 2024-04-24 RX ADMIN — METFORMIN HYDROCHLORIDE 1000 MG: 1000 TABLET, FILM COATED ORAL at 17:18

## 2024-04-24 RX ADMIN — CEPHALEXIN 250 MG: 250 CAPSULE ORAL at 00:15

## 2024-04-24 RX ADMIN — OXYCODONE HYDROCHLORIDE 5 MG: 5 TABLET ORAL at 20:26

## 2024-04-24 RX ADMIN — METHOCARBAMOL 500 MG: 500 TABLET ORAL at 13:00

## 2024-04-24 RX ADMIN — POLYETHYLENE GLYCOL 3350 17 G: 17 POWDER, FOR SOLUTION ORAL at 08:50

## 2024-04-24 RX ADMIN — OXYCODONE HYDROCHLORIDE 5 MG: 5 TABLET ORAL at 08:46

## 2024-04-24 RX ADMIN — GABAPENTIN 100 MG: 100 CAPSULE ORAL at 20:22

## 2024-04-24 RX ADMIN — ACETAMINOPHEN 650 MG: 325 TABLET ORAL at 06:37

## 2024-04-24 RX ADMIN — ENOXAPARIN SODIUM 30 MG: 100 INJECTION SUBCUTANEOUS at 07:30

## 2024-04-24 RX ADMIN — GABAPENTIN 100 MG: 100 CAPSULE ORAL at 07:30

## 2024-04-24 RX ADMIN — CEPHALEXIN 250 MG: 250 CAPSULE ORAL at 06:37

## 2024-04-24 RX ADMIN — ACETAMINOPHEN 650 MG: 325 TABLET ORAL at 13:00

## 2024-04-24 RX ADMIN — OXYCODONE HYDROCHLORIDE 5 MG: 5 TABLET ORAL at 12:57

## 2024-04-24 RX ADMIN — CEPHALEXIN 250 MG: 250 CAPSULE ORAL at 12:09

## 2024-04-24 RX ADMIN — METFORMIN HYDROCHLORIDE 1000 MG: 1000 TABLET, FILM COATED ORAL at 07:30

## 2024-04-24 RX ADMIN — METHOCARBAMOL 500 MG: 500 TABLET ORAL at 07:30

## 2024-04-24 RX ADMIN — ATORVASTATIN CALCIUM 10 MG: 10 TABLET, FILM COATED ORAL at 20:22

## 2024-04-24 RX ADMIN — ACETAMINOPHEN 650 MG: 325 TABLET ORAL at 21:47

## 2024-04-24 RX ADMIN — ENOXAPARIN SODIUM 30 MG: 100 INJECTION SUBCUTANEOUS at 20:22

## 2024-04-24 ASSESSMENT — PAIN SCALES - GENERAL
PAINLEVEL_OUTOF10: 4
PAINLEVEL_OUTOF10: 4
PAINLEVEL_OUTOF10: 3
PAINLEVEL_OUTOF10: 4
PAINLEVEL_OUTOF10: 4
PAINLEVEL_OUTOF10: 3
PAINLEVEL_OUTOF10: 2
PAINLEVEL_OUTOF10: 3
PAINLEVEL_OUTOF10: 0

## 2024-04-24 ASSESSMENT — PAIN DESCRIPTION - DESCRIPTORS
DESCRIPTORS: ACHING

## 2024-04-24 ASSESSMENT — PAIN - FUNCTIONAL ASSESSMENT
PAIN_FUNCTIONAL_ASSESSMENT: ACTIVITIES ARE NOT PREVENTED

## 2024-04-24 ASSESSMENT — PAIN DESCRIPTION - LOCATION
LOCATION: LEG

## 2024-04-24 ASSESSMENT — PAIN DESCRIPTION - ORIENTATION
ORIENTATION: LEFT

## 2024-04-24 NOTE — PROGRESS NOTES
Updated Dr. Hobbs of urinary retention. Per Dr. Hobbs, continue to bladder train and may need romero. Per Dr. Hobbs, also RN to updated patient that she may need to be sent home with catheter if she does continue to have retention. Completed.

## 2024-04-24 NOTE — PROGRESS NOTES
[facility]   IN-PATIENT SERVICE   [facility]    Progress note            Date:   2024  Patient name:  Dariana Juan  Date of admission:  2024  2:37 PM  MRN:   [id number]  Account:  [id number]  YOB: 1955  PCP:    Kathryn Ortiz APRN - CNP  Room:   [address] [place] [address]  Code Status:    Full Code    Chief Complaint:     Rehab    History Obtained From:     patient    History of Present Illness:     The patient is a 68 y.o.  Non- / non  female who presents with No chief complaint on file.   and she is admitted to the rehab facility for therapy.    68-year-old female with history of osteoporosis, has been on bisphosphonates for several years now, with recommendations to switch to Prolia as per her primary physician given poor response to bisphosphonates, initially presented to outside hospital with left-sided femur fracture.  This was while she was playing hockey with her grandson.  While there she was also noted to have a right sided stress fracture.    Her past medical history significant for melanoma, BRCA gene mutation.    2024  Patient doing well, reports was able to tolerate therapy, no dizziness      Past Medical History:     Past Medical History:   Diagnosis Date    BRCA1 gene mutation positive     BRCA2 gene mutation positive     Cancer (HCC)     melonoma, left arm, melanoma back of neck        Past Surgical History:     Past Surgical History:   Procedure Laterality Date    BLADDER SUSPENSION      prolapse repair    BREAST BIOPSY      three titaanium markers in left breast      SECTION      CHOLECYSTECTOMY      COLONOSCOPY      EMG          FEMUR COMFORT NAIL INSERTION  2024    FEMUR COMFORT NAIL INSERTION Right 04/10/2024    PROPHYLACTIC INTRAMEDULLARY NAIL INSERTION FEMUR    HYSTERECTOMY (CERVIX STATUS UNKNOWN)      has lone ovary    THYROID SURGERY      TIBIA FRACTURE SURGERY Left 2024    FEMORAL INTRAMEDULLARY NAIL

## 2024-04-24 NOTE — PLAN OF CARE
Problem: Discharge Planning  Goal: Discharge to home or other facility with appropriate resources  4/24/2024 1355 by Any Fernandez RN  Outcome: Progressing  Flowsheets (Taken 4/24/2024 0730)  Discharge to home or other facility with appropriate resources: Identify barriers to discharge with patient and caregiver   Pt to discharge to home once medically cleared.   Problem: Safety - Adult  Goal: Free from fall injury  4/24/2024 1355 by Any Fernandez RN  Outcome: Progressing  Flowsheets (Taken 4/24/2024 1110)  Free From Fall Injury: Instruct family/caregiver on patient safety   No injury noted this shift.   Problem: Skin/Tissue Integrity  Goal: Absence of new skin breakdown  Description: 1.  Monitor for areas of redness and/or skin breakdown  2.  Assess vascular access sites hourly  3.  Every 4-6 hours minimum:  Change oxygen saturation probe site  4.  Every 4-6 hours:  If on nasal continuous positive airway pressure, respiratory therapy assess nares and determine need for appliance change or resting period.  4/24/2024 1355 by Any Fernandez RN  Outcome: Progressing   No new skin issues noted this shift.   Problem: ABCDS Injury Assessment  Goal: Absence of physical injury  4/24/2024 1355 by Any Fernandez RN  Outcome: Progressing  Flowsheets (Taken 4/24/2024 1110)  Absence of Physical Injury: Implement safety measures based on patient assessment   No injury noted this shift.   Problem: Pain  Goal: Verbalizes/displays adequate comfort level or baseline comfort level  4/24/2024 1355 by Any Fernandez RN  Outcome: Progressing  Flowsheets (Taken 4/24/2024 0730)  Verbalizes/displays adequate comfort level or baseline comfort level: Encourage patient to monitor pain and request assistance   Pt rates pain as high as 4 this shift. Prn oxycodone and scheduled robaxin and tylenol effective for pain control.   Problem: Nutrition Deficit:  Goal: Optimize nutritional status  4/24/2024 1355 by Any Fernandez  RN  Outcome: Progressing   Pt tolerating po intake this shift.

## 2024-04-24 NOTE — PLAN OF CARE
Problem: Discharge Planning  Goal: Discharge to home or other facility with appropriate resources  Outcome: Progressing     Problem: Safety - Adult  Goal: Free from fall injury  Outcome: Progressing     Problem: Skin/Tissue Integrity  Goal: Absence of new skin breakdown  Description: 1.  Monitor for areas of redness and/or skin breakdown    Problem: Nutrition Deficit:  Goal: Optimize nutritional status  Outcome: Progressing   Outcome: Progressing     Problem: ABCDS Injury Assessment  Goal: Absence of physical injury  Outcome: Progressing     Problem: Pain  Goal: Verbalizes/displays adequate comfort level or baseline comfort level  Outcome: Progressing

## 2024-04-24 NOTE — PROGRESS NOTES
Physical Therapy  Facility/Department: [facility] ACUTE REHAB  Rehabilitation Physical Therapy Note    NAME: Dariana Juan  : 1955 (68 y.o.)  MRN: [id number]  CODE STATUS: Full Code    Date of Service: 24      Past Medical History:   Diagnosis Date    BRCA1 gene mutation positive     BRCA2 gene mutation positive     Cancer (HCC)     melonoma, left arm, melanoma back of neck     Past Surgical History:   Procedure Laterality Date    BLADDER SUSPENSION      prolapse repair    BREAST BIOPSY      three titaanium markers in left breast      SECTION      CHOLECYSTECTOMY      COLONOSCOPY      EMG          FEMUR COMFORT NAIL INSERTION  2024    FEMUR COMFORT NAIL INSERTION Right 04/10/2024    PROPHYLACTIC INTRAMEDULLARY NAIL INSERTION FEMUR    HYSTERECTOMY (CERVIX STATUS UNKNOWN)      has lone ovary    THYROID SURGERY      TIBIA FRACTURE SURGERY Left 2024    FEMORAL INTRAMEDULLARY NAIL INSERTION performed by Patricio Caruso DO at [facility] OR    TIBIA FRACTURE SURGERY Right 4/10/2024    PROPHYLACTIC INTRAMEDULLARY NAIL INSERTION FEMUR performed by Patricio Caruso DO at [facility] OR       Chart Reviewed: Yes  Patient assessed for rehabilitation services?: Yes  Additional Pertinent Hx: recent removal of melnoma L upper arm in March  Family / Caregiver Present: No  Referring Practitioner: Rain  Referral Date : 24  Diagnosis: bilateral femur fxs with ORIF  Other (Comment): Pt is awake and alert in agreement with PT intevention, Pt with Hgb 6.9 okay per RN to see with activity as tolerated.  General Comment  Comments: Patient with increased edema & swelling of left thigh noted.    Restrictions:  Restrictions/Precautions: General Precautions;Fall Risk;Weight Bearing  Lower Extremity Weight Bearing Restrictions  Right Lower Extremity Weight Bearing: Weight Bearing As Tolerated  Left Lower Extremity Weight Bearing: Weight Bearing As Tolerated  Position Activity Restriction  Other position/activity

## 2024-04-24 NOTE — CARE COORDINATION
ARU CASE MANAGEMENT NOTE:    Admission Date:  4/13/2024 Dariana Juan is a 68 y.o.  female    Admitted for : Closed left hip fracture, sequela [S72.002S]    Patient is alert and oriented x4.    Spoke with patient regarding discharge plan: Discussed discharge with the patient. She was supposed to have an appointment with Dr. Caruso tomorrow morning at 0845. [facility] is unable to supply transport at this time. Spoke with Dr. Caruso office and he was okay with moving her appt. New appt at 1000 am on 5/2/2024. Reviewed with patient - her  is out of town but her cousin will be able to take her. Discussed orders for adaptive equipment - will not be covered through Medicare. Family was already aware and is working on purchasing through local medical supply stores. Verified with Lena at [facility] that the patient is accepted and will dc tomorrow.    Will continue to follow for additional discharge needs.    Electronically signed by Estefani Mcbride RN on 4/24/2024 at 3:23 PM

## 2024-04-24 NOTE — PROGRESS NOTES
Physical Medicine & Rehabilitation  Progress Note      Subjective:      68 year-old female with L subtrochanteric femur fracture, and R femoral shaft stress fracture. Patient is progressing well with therapies. Observed in family training in therapy gym today. Pain is responding to medication. Voiding trial initiated today and she is voiding spontaneously.     ROS:  Denies fevers, chills, sweats.  No chest pain, palpitations, lightheadedness.  Denies coughing, wheezing or shortness of breath.  Denies abdominal pain, nausea, diarrhea or constipation.  No new areas of joint pain.  Denies new areas of numbness or weakness.  Denies new anxiety or depression issues.  No new skin problems.    Rehabilitation:       PT:    Bed mobility  Bridging: Supervision  Rolling to Left: Supervision  Rolling to Right: Supervision  Supine to Sit: Supervision  Sit to Supine: Supervision  Scooting: Independent  Bed Mobility Comments: See ZHANG jane note for bed mobility note. See ZHANG jane note for bed mobility note. Verbal from ZHANG, Supine to sit with supervision         Transfers  Sit to Stand: Stand by assistance (education to straighten (B) knee's.)  Stand to Sit: Contact guard assistance, Stand by assistance  Bed to Chair: 2 Person Assistance, Dependent/Total (use of fabiola stedy)  Stand Pivot Transfers: Contact guard assistance (with RW)  Comment: transfers completed with RW         Ambulation  Surface: Level tile, Ramp  Device: Rolling Walker  Other Apparatus: Wheelchair follow  Assistance: Contact guard assistance  Quality of Gait: Pt continues to demonstrate decreased fluidity of gait with L hip hike present and fluctuation between step to and step through gait pattern, continued cues to bend and march (B) LE. Pt requires frequent rest breaks between ambulations to use breathing techniques for energy conservation.  Gait Deviations: Decreased step length, Decreased step height (patient fluctuates from reciprocal to step to gait

## 2024-04-24 NOTE — PROGRESS NOTES
[facility]   Acute Rehabilitation Occupational Therapy Daily Treatment Note    Date: 24  Patient Name: Dariana Juan       Room: 2639/2639-01  MRN: [id number]  Account: [id number]   : 1955  (68 y.o.) Gender: female       Referring Practitioner: Aaron Rodrigez MD  Diagnosis: Left subtrochanteric femur fracture and right femoral stress fracture status post IM nailing-felt to be related biphosphonate  Additional Pertinent Hx: Ms. Dariana Juan is a 68 y.o. female who was admitted to [facility] on 2024 with Fall and Dislocation. 68-year-old female admitted [facility] for left thigh pain and deformity-noted playing hockey with her grandson and fell on her left leg she had immediate pain found to have left femur fracture and baseline lives with her spouse is independent spouse has cancer and is unable provide much assistance.  Patient takes biphosphonate's for osteoporosis and neuropathy of unknown origin. Ortho-left subtrochanteric femur fracture status post IMN and right femoral shaft stress fracture plan to go to the OR/10/24 for treatment right femoral shaft stress fracture weightbearing as tolerated left lower extremity nonweightbearing right lower extremity. Felt to be biphosphonate related fracture. Trauma-as above. Pt admitted to ARU on 2024.    Treatment Diagnosis: Impaired self-care status    Past Medical History:  has a past medical history of BRCA1 gene mutation positive, BRCA2 gene mutation positive, and Cancer (HCC).    Past Surgical History:   has a past surgical history that includes Breast biopsy;  section; Hysterectomy; Thyroid surgery; Cholecystectomy; Colonoscopy; bladder suspension; EMG; Femur Sarah nail insertion (2024); Tibia fracture surgery (Left, 2024); Femur Sarah nail insertion (Right, 04/10/2024); and Tibia fracture surgery (Right, 4/10/2024).    Restrictions  Restrictions/Precautions  Restrictions/Precautions:

## 2024-04-25 VITALS
WEIGHT: 136.8 LBS | OXYGEN SATURATION: 100 % | RESPIRATION RATE: 16 BRPM | TEMPERATURE: 97.8 F | BODY MASS INDEX: 20.73 KG/M2 | HEIGHT: 68 IN | DIASTOLIC BLOOD PRESSURE: 84 MMHG | HEART RATE: 104 BPM | SYSTOLIC BLOOD PRESSURE: 125 MMHG

## 2024-04-25 LAB
GLUCOSE BLD-MCNC: 113 MG/DL (ref 65–105)
GLUCOSE BLD-MCNC: 120 MG/DL (ref 65–105)

## 2024-04-25 PROCEDURE — 6370000000 HC RX 637 (ALT 250 FOR IP): Performed by: PHYSICAL MEDICINE & REHABILITATION

## 2024-04-25 PROCEDURE — 99231 SBSQ HOSP IP/OBS SF/LOW 25: CPT | Performed by: INTERNAL MEDICINE

## 2024-04-25 PROCEDURE — 97110 THERAPEUTIC EXERCISES: CPT

## 2024-04-25 PROCEDURE — 6370000000 HC RX 637 (ALT 250 FOR IP): Performed by: NURSE PRACTITIONER

## 2024-04-25 PROCEDURE — 82947 ASSAY GLUCOSE BLOOD QUANT: CPT

## 2024-04-25 PROCEDURE — 97530 THERAPEUTIC ACTIVITIES: CPT

## 2024-04-25 PROCEDURE — 6360000002 HC RX W HCPCS: Performed by: NURSE PRACTITIONER

## 2024-04-25 PROCEDURE — 97535 SELF CARE MNGMENT TRAINING: CPT

## 2024-04-25 PROCEDURE — 6370000000 HC RX 637 (ALT 250 FOR IP): Performed by: INTERNAL MEDICINE

## 2024-04-25 PROCEDURE — 99238 HOSP IP/OBS DSCHRG MGMT 30/<: CPT | Performed by: PHYSICAL MEDICINE & REHABILITATION

## 2024-04-25 PROCEDURE — 97116 GAIT TRAINING THERAPY: CPT

## 2024-04-25 RX ORDER — ATORVASTATIN CALCIUM 10 MG/1
10 TABLET, FILM COATED ORAL NIGHTLY
Qty: 30 TABLET | Refills: 3 | Status: SHIPPED | OUTPATIENT
Start: 2024-04-25

## 2024-04-25 RX ORDER — OXYCODONE HYDROCHLORIDE 5 MG/1
5 TABLET ORAL EVERY 6 HOURS PRN
Qty: 28 TABLET | Refills: 0 | Status: SHIPPED | OUTPATIENT
Start: 2024-04-25 | End: 2024-05-02

## 2024-04-25 RX ORDER — TAMSULOSIN HYDROCHLORIDE 0.4 MG/1
0.4 CAPSULE ORAL DAILY
Qty: 30 CAPSULE | Refills: 3 | Status: SHIPPED | OUTPATIENT
Start: 2024-04-25

## 2024-04-25 RX ORDER — ENOXAPARIN SODIUM 100 MG/ML
30 INJECTION SUBCUTANEOUS 2 TIMES DAILY
Qty: 3.6 ML | Refills: 0 | Status: SHIPPED | OUTPATIENT
Start: 2024-04-25 | End: 2024-05-01

## 2024-04-25 RX ORDER — LEVOTHYROXINE SODIUM 0.1 MG/1
100 TABLET ORAL DAILY
Qty: 30 TABLET | Refills: 3 | Status: SHIPPED | OUTPATIENT
Start: 2024-04-25

## 2024-04-25 RX ORDER — GABAPENTIN 100 MG/1
100 CAPSULE ORAL 2 TIMES DAILY
Qty: 60 CAPSULE | Refills: 2 | Status: SHIPPED | OUTPATIENT
Start: 2024-04-25 | End: 2024-07-24

## 2024-04-25 RX ORDER — POLYETHYLENE GLYCOL 3350 17 G/17G
17 POWDER, FOR SOLUTION ORAL DAILY PRN
Qty: 527 G | Refills: 1 | COMMUNITY
Start: 2024-04-25

## 2024-04-25 RX ORDER — METHOCARBAMOL 500 MG/1
500 TABLET, FILM COATED ORAL 3 TIMES DAILY
Qty: 30 TABLET | Refills: 0 | Status: SHIPPED | OUTPATIENT
Start: 2024-04-25 | End: 2024-05-02

## 2024-04-25 RX ADMIN — OXYCODONE HYDROCHLORIDE 5 MG: 5 TABLET ORAL at 13:59

## 2024-04-25 RX ADMIN — METHOCARBAMOL 500 MG: 500 TABLET ORAL at 13:59

## 2024-04-25 RX ADMIN — ACETAMINOPHEN 650 MG: 325 TABLET ORAL at 06:00

## 2024-04-25 RX ADMIN — METHOCARBAMOL 500 MG: 500 TABLET ORAL at 10:02

## 2024-04-25 RX ADMIN — TAMSULOSIN HYDROCHLORIDE 0.4 MG: 0.4 CAPSULE ORAL at 10:03

## 2024-04-25 RX ADMIN — METFORMIN HYDROCHLORIDE 1000 MG: 1000 TABLET, FILM COATED ORAL at 10:07

## 2024-04-25 RX ADMIN — ACETAMINOPHEN 650 MG: 325 TABLET ORAL at 13:59

## 2024-04-25 RX ADMIN — OXYCODONE HYDROCHLORIDE 5 MG: 5 TABLET ORAL at 10:03

## 2024-04-25 RX ADMIN — GABAPENTIN 100 MG: 100 CAPSULE ORAL at 10:03

## 2024-04-25 RX ADMIN — ENOXAPARIN SODIUM 30 MG: 100 INJECTION SUBCUTANEOUS at 10:02

## 2024-04-25 RX ADMIN — LEVOTHYROXINE SODIUM 100 MCG: 0.1 TABLET ORAL at 06:01

## 2024-04-25 RX ADMIN — OXYCODONE HYDROCHLORIDE 5 MG: 5 TABLET ORAL at 06:01

## 2024-04-25 ASSESSMENT — PAIN SCALES - GENERAL
PAINLEVEL_OUTOF10: 4
PAINLEVEL_OUTOF10: 4
PAINLEVEL_OUTOF10: 1
PAINLEVEL_OUTOF10: 2
PAINLEVEL_OUTOF10: 4
PAINLEVEL_OUTOF10: 4
PAINLEVEL_OUTOF10: 3

## 2024-04-25 ASSESSMENT — PAIN DESCRIPTION - ORIENTATION
ORIENTATION: LEFT

## 2024-04-25 ASSESSMENT — PAIN DESCRIPTION - DESCRIPTORS
DESCRIPTORS: ACHING

## 2024-04-25 ASSESSMENT — PAIN DESCRIPTION - LOCATION
LOCATION: KNEE;LEG
LOCATION: LEG
LOCATION: LEG;KNEE
LOCATION: LEG

## 2024-04-25 ASSESSMENT — PAIN - FUNCTIONAL ASSESSMENT
PAIN_FUNCTIONAL_ASSESSMENT: ACTIVITIES ARE NOT PREVENTED

## 2024-04-25 NOTE — PROGRESS NOTES
ACUTE INPATIENT REHABILITATION DISCHARGE  [facility]    Patient Name: Dariana Juan  MRN: [id number]     Patient discharged in stable condition as per order of attending physician.     AVS provided by nurse at time of discharge, which includes all necessary medical information pertaining to the patients current course of illness, treatment, medications, post-discharge goals of care, and treatment preferences.     Provision of Current Reconciled Medication List to Patient at Discharge   Indicate the route(s) of transmission of the current reconciled medication list to the patient/family/caregiver. Paper Based (e.g. fax, copies, print outs)     Availability of \"My Chart\" offered to patient as a tool for updated health record.  Steps for activation discussed with patient as mentioned on AVS.      Patient/responsible party verbalize understanding of discharge plan and are in agreement with goal/plan/treatment preferences.     Belongings including Glasses, cell phone and   sent with patient/responsible party.      Home medications sent home with patient/responsible party N/A    Car Transfer   Level of assistance required for patient transfer into vehicle:   SETUP OR CLEAN UP ASSISTANCE: Denver sets up or cleans up; patient completes activity. Denver assists only prior to or following the activity     High-Risk Drug Classes: Use and Indication   Check if the patient is taking any medications by pharmacological classification If yes, check if there is an indication noted for all meds in the drug class   Antipsychotic No If yes: indication noted?  [] If no indication noted, follow up with provider for order clarification   Anticoagulant Yes If yes: indication noted?  []    Antibiotic No If yes: indication noted?  []    Opioid Yes If yes: indication noted?  []    Antiplatelet No If yes: indication noted?  []    Hypoglycemic (Including Insulin) Yes If yes: indication noted?  []        Pain Assessment   Over

## 2024-04-25 NOTE — PLAN OF CARE
Problem: Discharge Planning  Goal: Discharge to home or other facility with appropriate resources  4/24/2024 2316 by Lorri Fuentes RN  Outcome: Progressing  4/24/2024 1355 by Any Fernandez RN  Outcome: Progressing  Flowsheets (Taken 4/24/2024 0730)  Discharge to home or other facility with appropriate resources: Identify barriers to discharge with patient and caregiver     Problem: Safety - Adult  Goal: Free from fall injury  4/24/2024 2316 by Lorri Fuentes RN  Outcome: Progressing  4/24/2024 1355 by Any Fernandez RN  Outcome: Progressing  Flowsheets (Taken 4/24/2024 1110)  Free From Fall Injury: Instruct family/caregiver on patient safety     Problem: Skin/Tissue Integrity  Goal: Absence of new skin breakdown  Description: 1.  Monitor for areas of redness and/or skin breakdown  2.  Assess vascular access sites hourly  3.  Every 4-6 hours minimum:  Change oxygen saturation probe site  4.  Every 4-6 hours:  If on nasal continuous positive airway pressure, respiratory therapy assess nares and determine need for appliance change or resting period.  4/24/2024 2316 by Lorri Fuentes RN  Outcome: Progressing  4/24/2024 1355 by Any Fernandez RN  Outcome: Progressing     Problem: ABCDS Injury Assessment  Goal: Absence of physical injury  4/24/2024 2316 by Lorri Fuentes RN  Outcome: Progressing  4/24/2024 1355 by Any Fernandez RN  Outcome: Progressing  Flowsheets (Taken 4/24/2024 1110)  Absence of Physical Injury: Implement safety measures based on patient assessment     Problem: Pain  Goal: Verbalizes/displays adequate comfort level or baseline comfort level  4/24/2024 2316 by Lorri Fuentes RN  Outcome: Progressing  4/24/2024 1355 by Any Fernandez RN  Outcome: Progressing  Flowsheets (Taken 4/24/2024 0730)  Verbalizes/displays adequate comfort level or baseline comfort level: Encourage patient to monitor pain and request assistance     Problem: Nutrition Deficit:  Goal: Optimize nutritional

## 2024-04-25 NOTE — PROGRESS NOTES
04/25/24 1100   Encounter Summary   Encounter Overview/Reason Attempted Encounter   Service Provided For Patient not available  (Patient was OOR.)   Assessment/Intervention/Outcome   Assessment Unable to assess  (Unavailable)

## 2024-04-25 NOTE — PLAN OF CARE
Problem: Discharge Planning  Goal: Discharge to home or other facility with appropriate resources  Outcome: Completed     Problem: Safety - Adult  Goal: Free from fall injury  Outcome: Completed     Problem: Skin/Tissue Integrity  Goal: Absence of new skin breakdown  Description: 1.  Monitor for areas of redness and/or skin breakdown  2.  Assess vascular access sites hourly  3.  Every 4-6 hours minimum:  Change oxygen saturation probe site  4.  Every 4-6 hours:  If on nasal continuous positive airway pressure, respiratory therapy assess nares and determine need for appliance change or resting period.  Outcome: Completed     Problem: ABCDS Injury Assessment  Goal: Absence of physical injury  Outcome: Completed     Problem: Pain  Goal: Verbalizes/displays adequate comfort level or baseline comfort level  Outcome: Completed     Problem: Nutrition Deficit:  Goal: Optimize nutritional status  Outcome: Completed

## 2024-04-25 NOTE — DISCHARGE INSTRUCTIONS
To obtain medical records, go to website: [employer]; click on Patient resources, then click on Request medical records. Fill out the required fields and they will mail the records directly to your house. If any questions, call Medical Records at [phone].

## 2024-04-25 NOTE — PROGRESS NOTES
ARU goal  Short Term Goals  Time Frame for Short Term Goals: 7-10 days  Short Term Goal 1: Pt will perform UB bathing/dressing set-up with Good safety  Short Term Goal 2: Pt will perform LB bathing/dressing Mod A with Good safety and use of AE/modified techniques as needed  Short Term Goal 3: Pt will perform functional transfers Mod A during self-care tasks with Good safety and use of least restrictive device  Short Term Goal 4: Pt will tolerate sitting EOB 10+ minutes during functional activity of choice SBA to improve dynamic balance and reaching outside JUAN for participation in self-care tasks  Short Term Goal 5: Pt will verbalize/demonstrate Good understanding of AE/DME/modified techniques to promote ease and independence with self-care tasks  Short Term Goal 6: Pt will verbalize/demonstrate Good understanding of 2-3 non-pharmaceutical pain management strategies to increase participation in self-care tasks and to improve quality of life  Short Term Goal 7: Pt will verbalize/demonstrate Good understanding of EC/WS strategies to increase participation and safety during daily activities  Short Term Goal 8: Pt will actively participate in 30+ minutes of therapeutic exercise/functional activity/social participation to promote safety and independence with self-care tasks and to improve overall quality of life    Long Term Goals  Time Frame for Long Term Goals : By discharge  Long Term Goal 1: Pt will perform UB bathing/dressing Mod I with Good safety  Long Term Goal 2: Pt will perform LB bathing/dressing Mod I with Good safety and use of AE/modified techniques as needed (Goal updated by Ivonne Miranda OTR/L on 4/22/2024)  Long Term Goal 3: Pt will perform functional transfers/mobility Mod I during self-care tasks with Good safety and use of least restrictive device (Goal updated by Ivonne Miranda OTR/L on 4/22/2024)  Long Term Goal 4: Pt will tolerate standing 8+ minutes during functional activity of choice Mod I to  improve balance/tolerance for self-care tasks (Goal updated by Ivonne Miranda OTR/L on 4/22/2024)  Long Term Goal 5: Pt will perform tub transfer SUP with Good safety and appropriate use of AE/DME as needed (Goal updated by Ivonne Miranda OTR/L on 4/22/2024)  Long Term Goal 6: Pt will verbalize/demonstrate Good understanding of home safety/fall prevention strategies to promote safety and independence with daily activities  Long Term Goal 7: (Goal met with pt ERINL - Cam Suero OTR/L 4/15/24) OT to further assess fine motor control/coordination using the 9 hole peg test (inform OTR when complete to update goal)  Long Term Goal 8: (Goal met with pt EVANGELINA Suero OTR/L 4/15/24) OT to further assess hand/ strength using the hand dynamometer (Inform OTR when complete to update goal)    Plan  Occupational Therapy Plan  Times Per Week: 5-7  Times Per Day: Twice a day  Current Treatment Recommendations: Strengthening, Functional mobility training, Balance training, Endurance training, Pain management, Safety education & training, Patient/Caregiver education & training, Equipment evaluation, education, & procurement, Self-Care / ADL, Home management training, Co-Treatment     04/25/24 0856   OT Individual Minutes   Time In 0800   Time Out 0902   Minutes 62     PM: D/C    This patient  Dariana uJan  was seen by the Occupational Therapy Student identified above, including any treatment and documentation activity.  The above documentation completed by LANI Student  was reviewed and accepted by the Supervising Clinical Instructor   Electronically signed by NICO Lentz on 4/25/24 at 1:02 PM EDT  .    Electronically signed by MARÍA ELENA REY on 4/25/24 at 12:55 PM EDT

## 2024-04-25 NOTE — DISCHARGE INSTR - COC
Continuity of Care Form    Patient Name: Dariana Juan   :  1955  MRN:  [id number]    Admit date:  2024  Discharge date:  2024    Code Status Order: Full Code   Advance Directives:     Admitting Physician:  Aaron Rodrigez MD  PCP: Kathryn Ortiz, APRN - CNP    Discharging Nurse: Grace RN  Discharging Hospital Unit/Room#: 2639/2639-01  Discharging Unit Phone Number [phone]    Emergency Contact:   Extended Emergency Contact Information  Primary Emergency Contact: Christiano Juan  Address: [address] [place] [address] [facility]  Mobile Phone: [phone]  Relation: Spouse  Secondary Emergency Contact: Angelique Medina  Address: .           [place] [id number] [facility]  Home Phone: [phone]  Relation: Child    Past Surgical History:  Past Surgical History:   Procedure Laterality Date    BLADDER SUSPENSION      prolapse repair    BREAST BIOPSY      three titaanium markers in left breast 2009     SECTION      CHOLECYSTECTOMY      COLONOSCOPY      EMG          FEMUR COMFORT NAIL INSERTION  2024    FEMUR COMFORT NAIL INSERTION Right 04/10/2024    PROPHYLACTIC INTRAMEDULLARY NAIL INSERTION FEMUR    HYSTERECTOMY (CERVIX STATUS UNKNOWN)      has lone ovary    THYROID SURGERY      TIBIA FRACTURE SURGERY Left 2024    FEMORAL INTRAMEDULLARY NAIL INSERTION performed by Patricio Caruso DO at [facility] OR    TIBIA FRACTURE SURGERY Right 4/10/2024    PROPHYLACTIC INTRAMEDULLARY NAIL INSERTION FEMUR performed by Patricio Caruso DO at [facility] OR       Immunization History:   Immunization History   Administered Date(s) Administered    COVID-19, PFIZER PURPLE top, DILUTE for use, (age 12 y+), 30mcg/0.3mL 2021, 2021, 10/29/2021    COVID-19, PFIZER, ( formula), (age 12y+), IM, 30mcg/0.3mL 10/24/2023       Active Problems:  Patient Active Problem List   Diagnosis Code    Left displaced femoral neck fracture (HCC) S72.002A    
never

## 2024-04-25 NOTE — PROGRESS NOTES
[facility]   IN-PATIENT SERVICE   [facility]    Progress note            Date:   2024  Patient name:  Dariana Juan  Date of admission:  2024  2:37 PM  MRN:   [id number]  Account:  [id number]  YOB: 1955  PCP:    Kathryn Ortiz APRN - CNP  Room:   [address] [place] [address]  Code Status:    Full Code    Chief Complaint:     Rehab    History Obtained From:     patient    History of Present Illness:     The patient is a 68 y.o.  Non- / non  female who presents with No chief complaint on file.   and she is admitted to the rehab facility for therapy.    68-year-old female with history of osteoporosis, has been on bisphosphonates for several years now, with recommendations to switch to Prolia as per her primary physician given poor response to bisphosphonates, initially presented to outside hospital with left-sided femur fracture.  This was while she was playing hockey with her grandson.  While there she was also noted to have a right sided stress fracture.    Her past medical history significant for melanoma, BRCA gene mutation.    2024  Patient doing well, reports was able to tolerate therapy, no dizziness      Past Medical History:     Past Medical History:   Diagnosis Date    BRCA1 gene mutation positive     BRCA2 gene mutation positive     Cancer (HCC)     melonoma, left arm, melanoma back of neck        Past Surgical History:     Past Surgical History:   Procedure Laterality Date    BLADDER SUSPENSION      prolapse repair    BREAST BIOPSY      three titaanium markers in left breast      SECTION      CHOLECYSTECTOMY      COLONOSCOPY      EMG          FEMUR COMFORT NAIL INSERTION  2024    FEMUR COMFORT NAIL INSERTION Right 04/10/2024    PROPHYLACTIC INTRAMEDULLARY NAIL INSERTION FEMUR    HYSTERECTOMY (CERVIX STATUS UNKNOWN)      has lone ovary    THYROID SURGERY      TIBIA FRACTURE SURGERY Left 2024    FEMORAL INTRAMEDULLARY NAIL  OT.  Pain controlled  On Keflex for UTI.    4/24  Patient seen and examined  Quintero's catheter taken out, patient found to be retaining  Recommend see urology as outpatient  Finished course of antibiotics for UTI, DC antibiotics  Blood sugar has been stable  Denies any chest pain shortness of breath    4/25    Patient seen and examined  Quintero's taken out is been urinating well, vitals have been stable  This denied any chest pain shortness of breath  No objection on discharge.  Consultations:   IP CONSULT TO DIETITIAN  IP CONSULT TO SOCIAL WORK  IP CONSULT TO INTERNAL MEDICINE  IP CONSULT TO UROLOGY    Patient is admitted as inpatient status because of co-morbidities listed above, severity of signs and symptoms as outlined, requirement for current medical therapies and most importantly because of direct risk to patient if care not provided in a hospital setting.    Virgie Samuel MD  4/25/2024  2:34 PM    Copy sent to Dr. Ortiz, Kathryn MADDEN, APRN - CNP    Please note that this chart was generated using voice recognition Dragon dictation software.  Although every effort was made to ensure the accuracy of this automated transcription, some errors in transcription may have occurred.

## 2024-04-25 NOTE — CARE COORDINATION
[facility] Acute Inpatient Rehabilitation  Case Management Discharge Note    Discharge Disposition: Home with spouse    Discharge Transportation Method: family car ,  Time: tbd    IMM Letter Status: Patient/Responsible Party agreeable with discharge: Second Signature Obtained, Patient/Responsible Party offered four hours to make informed decision regarding appeal process - Completed Letter Placed in Patient Chart    Home Healthcare Services: Company:     [employer]  [address]  [facility] [address]  Phone [phone]  Fax  [phone]      Outpatient Therapy Services: Not Applicable    DME:  rolling walker, shower chair, grab bars    Current reconciled medication list sent to subsequent provider via: Electronic Health Record      Patient Health Questionnaire-9 (PHQ-2 to 9)   Over the last 2 weeks, how often have you been bothered by any of the following problems?    1. Little Interest or pleasure in doing things?   Never or 1 Day - Score 0    2. Feeling down, depressed or hopeless?   Never or 1 Day - Score 0    3. Trouble falling or staying asleep, or sleeping too much?   Never or 1 Day - Score 0    4. Feeling tired or having little energy?   Never or 1 Day - Score 0    5. Poor appetite or overeating?   Never or 1 Day - Score 0    6. Feeling bad about yourself-or that you are a failure or have let yourself or your family down?   Never or 1 Day - Score 0    7. Trouble concentrating on things, such as reading the newspaper or watching television?    Never or 1 Day - Score 0    8. Moving or speaking so slowly that other people could have noticed? Or the opposite-being so fidgety or restless that you have been moving around a lot more than usual?  Never or 1 Day - Score 0    9. Thoughts that you would be better off dead or of hurting yourself in some way?   Never or 1 Day - Score 0    Total Score: 0  If score is above 15, Notify PM&R Physician    If you checked off any  problems, how difficult have these problems made it for you to do your work, take care of things at home, or get along with other people?    Not Difficult At All       Social Isolation     How often do you feel lonely or isolated from those around you?    Never       Access To Transportation     2.In the past six months to a year, has lack of transportation kept you from medical appointments or from getting your medications?”     No    3.In the past six months to a year, has lack of transportation kept you from non-medical meetings, appointments, work, or from getting things that you need?     No       Estefani Mcbride RN  Acute Inpatient Rehabilitation Case Management Department  Ph:[phone] Fax: [phone]

## 2024-04-25 NOTE — DISCHARGE SUMMARY
Physical Medicine & Rehabilitation  Discharge Summary     Patient Identification:  Dariana Juan  : 1955  Admit date: 2024  Discharge date: 2024   Attending provider: Aaron Rodrigez MD      Discharging provider: ZONIA LUZ MD    Primary care provider: Kathryn Ortiz APRN - CNP     Discharge Diagnoses:   L hip subtrochanter fracture  R femoral shaft stress fracture  Orthostatic hypotension  Osteoporosis  Hyponatremia  Thrombocytopenia - resolved  UTI  Urine retention - resolved  ARLYN - resolved  Blood loss anemia  Hyperlipidemia  Hypothyroidism    Discharge Functional Status:    Physical Therapy:  Bed Mobility:   Bed mobility  Bridging: Modified independent   Rolling to Left: Modified independent  Rolling to Right: Modified independent  Supine to Sit: Modified independent  Sit to Supine: Modified independent  Scooting: Modified independent  Bed Mobility Comments: See ZHANG jane note for bed mobility note. See ZHANG jane note for bed mobility note. Verbal from [place], Supine to sit with supervision         Transfers:   Transfers  Sit to Stand: Stand by assistance  Stand to Sit: Contact guard assistance  Bed to Chair: Contact guard assistance, Stand by assistance  Stand Pivot Transfers: Contact guard assistance, Stand by assistance  Car Transfer: Minimal Assistance, Contact guard assistance  Comment: transfers completed with RW         Mobility:   Ambulation  Surface: Level tile, Ramp  Device: Rolling Walker  Other Apparatus: Wheelchair follow  Assistance: Contact guard assistance, Stand by assistance  Quality of Gait: Pt continues to demonstrate decreased fluidity of gait with L hip hike present and fluctuation between step to and step through gait pattern, continued cues to bend and march (B) LE. Pt requires frequent rest breaks between ambulations to use breathing techniques for energy conservation.  Gait Deviations:  (varied step length)  Distance: 305 feet  Comments: Cues recieve  required ANNA stockings and abdominal binder. Her urine retention was managed with Quintero until she had successful voiding trial on 4/24/24. She was resumed on Flomax day of discharge and advised to monitor for hypotension symptoms at home, then review whether Flomax should continue with her PCP or urology follow ups. She was previously established with a urologist at [facility] for prolapse surgery and plans to follow up with them outpatient.  Chronic conditions were stable on home medications.     The patient participated in an aggressive multidisciplinary inpatient rehabilitation program involving 3 hours per day, 5 days per week of rehabilitation.  Patient benefited from inpatient rehab and was discharged in good stable condition.      Consults:   Internal Medicine and Orthopedic Surgery    Significant Diagnostics:   CBC:   Lab Results   Component Value Date/Time    WBC 8.3 04/21/2024 07:14 AM    RBC 3.11 04/21/2024 07:14 AM    HGB 9.1 04/21/2024 07:14 AM    HCT 28.5 04/21/2024 07:14 AM    MCV 91.7 04/21/2024 07:14 AM    MCH 29.3 04/21/2024 07:14 AM    MCHC 31.9 04/21/2024 07:14 AM    RDW 15.2 04/21/2024 07:14 AM     04/21/2024 07:14 AM    MPV 10.1 04/21/2024 07:14 AM     CMP:    Lab Results   Component Value Date/Time     04/21/2024 07:14 AM    K 4.6 04/21/2024 07:14 AM     04/21/2024 07:14 AM    CO2 26 04/21/2024 07:14 AM    BUN 20 04/21/2024 07:14 AM    CREATININE 0.8 04/21/2024 07:14 AM    AGRATIO 2.0 04/07/2024 02:44 PM    LABGLOM 81 04/21/2024 07:14 AM    GLUCOSE 143 04/21/2024 07:14 AM    PROT 6.5 04/20/2024 06:28 AM    CALCIUM 8.6 04/21/2024 07:14 AM    BILITOT 0.8 04/20/2024 06:28 AM    ALKPHOS 185 04/20/2024 06:28 AM    AST 35 04/20/2024 06:28 AM    ALT 35 04/20/2024 06:28 AM         Patient Instructions:    WBAT BLEs. No driving until cleared by physician.     Medications, precautions and follow up reviewed with patient and family    Follow-up visits: See after visit summary from

## 2024-05-02 ENCOUNTER — OFFICE VISIT (OUTPATIENT)
Dept: ORTHOPEDIC SURGERY | Age: 69
End: 2024-05-02

## 2024-05-02 VITALS — BODY MASS INDEX: 20.61 KG/M2 | WEIGHT: 136 LBS | HEIGHT: 68 IN

## 2024-05-02 DIAGNOSIS — M84.351D STRESS FRACTURE OF RIGHT FEMUR WITH ROUTINE HEALING, SUBSEQUENT ENCOUNTER: Primary | ICD-10-CM

## 2024-05-02 DIAGNOSIS — S72.325D CLOSED NONDISPLACED TRANSVERSE FRACTURE OF SHAFT OF LEFT FEMUR WITH ROUTINE HEALING, SUBSEQUENT ENCOUNTER: ICD-10-CM

## 2024-05-02 RX ORDER — METHOCARBAMOL 500 MG/1
500 TABLET, FILM COATED ORAL 3 TIMES DAILY
Qty: 30 TABLET | Refills: 2 | Status: SHIPPED | OUTPATIENT
Start: 2024-05-02 | End: 2024-06-01

## 2024-05-30 ENCOUNTER — OFFICE VISIT (OUTPATIENT)
Dept: ORTHOPEDIC SURGERY | Age: 69
End: 2024-05-30

## 2024-05-30 VITALS — BODY MASS INDEX: 20.61 KG/M2 | WEIGHT: 136 LBS | HEIGHT: 68 IN

## 2024-05-30 DIAGNOSIS — M84.351D STRESS FRACTURE OF RIGHT FEMUR WITH ROUTINE HEALING, SUBSEQUENT ENCOUNTER: ICD-10-CM

## 2024-05-30 DIAGNOSIS — S72.325D CLOSED NONDISPLACED TRANSVERSE FRACTURE OF SHAFT OF LEFT FEMUR WITH ROUTINE HEALING, SUBSEQUENT ENCOUNTER: Primary | ICD-10-CM

## 2024-05-30 PROCEDURE — 99024 POSTOP FOLLOW-UP VISIT: CPT

## 2024-05-30 NOTE — PROGRESS NOTES
[facility]THO SPECIALISTS  [address]  [place] [address]  Dept: [phone]  Dept Fax: [phone]        Orthopaedic Trauma Clinic Follow Up      Subjective:   Date of Surgery:   4/8/24: Left femur IMN. (3w, 3d post-op.)  -Left pathologic subtrochanteric femur fracture.   4/10/24: Right femur IMN. (3w, 1d post-op.)  -Right femoral shaft stress fracture     Dariana Juan is a 68 y.o. year old female who presents to the clinic today for routine 7 weeks and 3-day followup regarding her left pathological subtrochanteric femur fracture, and right femur stress fracture status post bilateral femur intramedullary nail fixation. Patient reports that she is doing very well since previous appointment. She states that the right leg is completely pain free and her only complaint with the left leg is some residual swelling to the proximal leg and groin region. Patient is continuing to ambulate with cane and walker assistance. Patient reports that she has been working with home PT twice weekly. She states that she has reduced the frequency of Flexeril and is questioning if they are still needed at this time. Patient also states that there is a small scab with some redness to the lateral left leg that she would like evaluated for possible retained suture. Patient denies any recent consitutional symptoms. Patient denies any other complaints at this time.    Review of Systems  Gen: no fever, chills, malaise  CV: no chest pain or palpitations  Resp: no cough or shortness of breath  GI: no nausea, vomiting, diarrhea, or constipation  Neuro: no numbness, tingling, or weakness  Msk: Left leg swelling  10 remaining systems reviewed and negative    Objective :   There were no vitals filed for this visit.Body mass index is 20.68 kg/m².  General: No acute distress, resting comfortably in the clinic  Neuro: alert. oriented  Eyes: Extra-ocular muscles intact  Pulm:

## 2024-06-05 ENCOUNTER — TELEPHONE (OUTPATIENT)
Dept: ORTHOPEDIC SURGERY | Age: 69
End: 2024-06-05

## 2024-06-05 NOTE — TELEPHONE ENCOUNTER
Ayesha from [facility] called in for OVN regarding patient medication changes. OVN faxed.

## 2024-06-11 ENCOUNTER — HOSPITAL ENCOUNTER (OUTPATIENT)
Age: 69
Setting detail: SPECIMEN
Discharge: HOME | End: 2024-06-11

## 2024-06-11 LAB
25(OH)D3 SERPL-MCNC: 62.7 NG/ML (ref 30–100)
CALCIUM SERPL-MCNC: 9.4 MG/DL (ref 8.6–10.4)
CREAT SERPL-MCNC: 0.8 MG/DL (ref 0.5–0.9)
EST. AVERAGE GLUCOSE BLD GHB EST-MCNC: 120 MG/DL
GFR, ESTIMATED: 77 ML/MIN/1.73M2
HBA1C MFR BLD: 5.8 % (ref 4–6)
PTH-INTACT SERPL-MCNC: 24 PG/ML (ref 15–65)

## 2024-06-12 ENCOUNTER — PATIENT MESSAGE (OUTPATIENT)
Dept: ORTHOPEDIC SURGERY | Age: 69
End: 2024-06-12

## 2024-06-12 DIAGNOSIS — M84.351D STRESS FRACTURE OF RIGHT FEMUR WITH ROUTINE HEALING, SUBSEQUENT ENCOUNTER: ICD-10-CM

## 2024-06-12 DIAGNOSIS — S72.325D CLOSED NONDISPLACED TRANSVERSE FRACTURE OF SHAFT OF LEFT FEMUR WITH ROUTINE HEALING, SUBSEQUENT ENCOUNTER: Primary | ICD-10-CM

## 2024-06-12 LAB
CALCIUM UR-MCNC: 14.6 MG/DL (ref 100–300)
CALCIUM, URINE: 164 MG/24 H
COLLECT DURATION TIME SPEC: 24 H
COLLECT DURATION TIME SPEC: 24 H
CREATINE 24H UR-MRATE: 1080 MG/24 H
CREATININE URINE: 96 MG/DL
SPECIMEN VOL UR: 1125 ML
SPECIMEN VOL UR: 1125 ML

## 2024-06-12 NOTE — TELEPHONE ENCOUNTER
From: Dariana Juan  To: Dr. Patricio Caruso  Sent: 6/12/2024 11:23 AM EDT  Subject: Getting orders for out patient rehab     I will be done with in home health care Monday, June 17 my last day. Evaluator will probably come the next week to evaluate me. I told my PTA I plan on doing the rest of rehab at [facility]. [address] [place] phone number there is [phone]. She thought I need a referral from you. Can you let me know what I need to do. My number is [phone] or on my chart is ok too. Thanks!!

## 2024-07-30 ENCOUNTER — OFFICE VISIT (OUTPATIENT)
Dept: ORTHOPEDIC SURGERY | Age: 69
End: 2024-07-30

## 2024-07-30 VITALS — HEIGHT: 68 IN | WEIGHT: 136 LBS | BODY MASS INDEX: 20.61 KG/M2

## 2024-07-30 DIAGNOSIS — M84.351D STRESS FRACTURE OF RIGHT FEMUR WITH ROUTINE HEALING, SUBSEQUENT ENCOUNTER: ICD-10-CM

## 2024-07-30 DIAGNOSIS — S72.325D CLOSED NONDISPLACED TRANSVERSE FRACTURE OF SHAFT OF LEFT FEMUR WITH ROUTINE HEALING, SUBSEQUENT ENCOUNTER: Primary | ICD-10-CM

## 2024-07-30 PROCEDURE — 1036F TOBACCO NON-USER: CPT | Performed by: ORTHOPAEDIC SURGERY

## 2024-07-30 PROCEDURE — G8420 CALC BMI NORM PARAMETERS: HCPCS | Performed by: ORTHOPAEDIC SURGERY

## 2024-07-30 PROCEDURE — G8427 DOCREV CUR MEDS BY ELIG CLIN: HCPCS | Performed by: ORTHOPAEDIC SURGERY

## 2024-07-30 PROCEDURE — G8399 PT W/DXA RESULTS DOCUMENT: HCPCS | Performed by: ORTHOPAEDIC SURGERY

## 2024-07-30 PROCEDURE — 3017F COLORECTAL CA SCREEN DOC REV: CPT | Performed by: ORTHOPAEDIC SURGERY

## 2024-07-30 PROCEDURE — 1090F PRES/ABSN URINE INCON ASSESS: CPT | Performed by: ORTHOPAEDIC SURGERY

## 2024-07-30 NOTE — PROGRESS NOTES
[facility] ORTHO SPECIALISTS  [address]  [place] [address]  Dept: [phone]  Dept Fax: [phone]        Orthopaedic Trauma Clinic Follow Up      Subjective:   Date of Surgery:   4/8/24: Left femur IMN. (3M, 22d post-op.)  -Left pathologic subtrochanteric femur fracture.   4/10/24: Right femur IMN. (3M, 20d post-op.)  -Right femoral shaft stress fracture     Dariana Juan is a 69 y.o. year old female who presents to the clinic today for routine 3-month and 20-day followup regarding her right femoral stress fracture, and left pathologic subtrochanteric femur fracture status post IMN.  Patient states that she has done extremely well in the postoperative setting with minimal pain at this time.  Patient has successfully completed physical therapy and feels that she is back to doing the activities that she had done prior to the surgery.  Patient has no current deficits and is able to ambulate without difficulty.  Patient only takes Tylenol as needed for pain.  Patient has resumed playing baseball with her grandson however she did state that she will no longer play hockey due to the injury that she sustained over 3 months ago.  Patient states that she is doing well and has had no new injury, trauma or falls.  She denies any new numbness, ting or weakness.  She has no other orthopedic complaints or concerns at this time.      Review of Systems  Gen: no fever, chills, malaise  CV: no chest pain or palpitations  Resp: no cough or shortness of breath  GI: no nausea, vomiting, diarrhea, or constipation  Neuro: no numbness, tingling, or weakness  Msk: See HPI for detail  10 remaining systems reviewed and negative    Objective :   There were no vitals filed for this visit.Body mass index is 20.68 kg/m².  General: No acute distress, resting comfortably in the clinic  Neuro: alert. oriented  Eyes: Extra-ocular muscles intact  Pulm: Respirations unlabored and

## 2025-02-10 ENCOUNTER — HOSPITAL ENCOUNTER (OUTPATIENT)
Dept: WOMENS IMAGING | Age: 70
Discharge: HOME | End: 2025-02-12
Payer: MEDICARE

## 2025-02-10 DIAGNOSIS — Z12.31 ENCOUNTER FOR SCREENING MAMMOGRAM FOR MALIGNANT NEOPLASM OF BREAST: ICD-10-CM

## 2025-02-10 PROCEDURE — 77067 SCR MAMMO BI INCL CAD: CPT

## 2025-03-17 ENCOUNTER — HOSPITAL ENCOUNTER (OUTPATIENT)
Dept: PREADMISSION TESTING | Age: 70
Discharge: HOME | End: 2025-03-21

## 2025-03-17 VITALS — HEIGHT: 69 IN | WEIGHT: 138 LBS | BODY MASS INDEX: 20.44 KG/M2

## 2025-03-17 RX ORDER — TERIPARATIDE 250 UG/ML
20 INJECTION, SOLUTION SUBCUTANEOUS DAILY
COMMUNITY
Start: 2024-08-21

## 2025-03-17 RX ORDER — GABAPENTIN 300 MG/1
600 CAPSULE ORAL NIGHTLY
COMMUNITY

## 2025-03-17 RX ORDER — ASPIRIN 81 MG/1
81 TABLET, CHEWABLE ORAL DAILY
COMMUNITY

## 2025-03-17 RX ORDER — LANOLIN ALCOHOL/MO/W.PET/CERES
1000 CREAM (GRAM) TOPICAL DAILY
COMMUNITY

## 2025-03-17 NOTE — PROGRESS NOTES
be performed by a nurse practitioner or house officer.  Your IV will be started and you will meet your anesthesiologist.    When you go to surgery, your family will be directed to the surgical waiting room, where the doctor should speak with them after your surgery.    After surgery, you will be taken to the recovery area.  When you are alert and stable, you will receive instructions and be prepared for discharge.     If you use a Bi-PAP or C-PAP machine, please bring it with you and leave it in the car in case it is needed in recovery room.    Above instructions reviewed with patient via phone during PAT phone interview.  Patient verbalized understanding of above instructions.

## 2025-03-25 ENCOUNTER — ANESTHESIA EVENT (OUTPATIENT)
Dept: OPERATING ROOM | Age: 70
End: 2025-03-25
Payer: MEDICARE

## 2025-03-26 ENCOUNTER — ANESTHESIA (OUTPATIENT)
Dept: OPERATING ROOM | Age: 70
End: 2025-03-26
Payer: MEDICARE

## 2025-03-26 ENCOUNTER — HOSPITAL ENCOUNTER (OUTPATIENT)
Age: 70
Setting detail: OUTPATIENT SURGERY
Discharge: HOME | End: 2025-03-26
Attending: OPHTHALMOLOGY | Admitting: OPHTHALMOLOGY
Payer: MEDICARE

## 2025-03-26 VITALS
HEART RATE: 67 BPM | BODY MASS INDEX: 20.44 KG/M2 | HEIGHT: 69 IN | SYSTOLIC BLOOD PRESSURE: 117 MMHG | OXYGEN SATURATION: 100 % | TEMPERATURE: 97 F | RESPIRATION RATE: 16 BRPM | DIASTOLIC BLOOD PRESSURE: 88 MMHG | WEIGHT: 138 LBS

## 2025-03-26 PROCEDURE — 6360000002 HC RX W HCPCS: Performed by: NURSE ANESTHETIST, CERTIFIED REGISTERED

## 2025-03-26 PROCEDURE — 6360000002 HC RX W HCPCS: Performed by: OPHTHALMOLOGY

## 2025-03-26 PROCEDURE — 3700000000 HC ANESTHESIA ATTENDED CARE: Performed by: OPHTHALMOLOGY

## 2025-03-26 PROCEDURE — 2500000003 HC RX 250 WO HCPCS: Performed by: ANESTHESIOLOGY

## 2025-03-26 PROCEDURE — V2632 POST CHMBR INTRAOCULAR LENS: HCPCS | Performed by: OPHTHALMOLOGY

## 2025-03-26 PROCEDURE — 2709999900 HC NON-CHARGEABLE SUPPLY: Performed by: OPHTHALMOLOGY

## 2025-03-26 PROCEDURE — 2500000003 HC RX 250 WO HCPCS: Performed by: OPHTHALMOLOGY

## 2025-03-26 PROCEDURE — 6360000002 HC RX W HCPCS: Performed by: ANESTHESIOLOGY

## 2025-03-26 PROCEDURE — 3700000001 HC ADD 15 MINUTES (ANESTHESIA): Performed by: OPHTHALMOLOGY

## 2025-03-26 PROCEDURE — 3600000012 HC SURGERY LEVEL 2 ADDTL 15MIN: Performed by: OPHTHALMOLOGY

## 2025-03-26 PROCEDURE — 3600000002 HC SURGERY LEVEL 2 BASE: Performed by: OPHTHALMOLOGY

## 2025-03-26 PROCEDURE — 6370000000 HC RX 637 (ALT 250 FOR IP): Performed by: OPHTHALMOLOGY

## 2025-03-26 PROCEDURE — 7100000010 HC PHASE II RECOVERY - FIRST 15 MIN: Performed by: OPHTHALMOLOGY

## 2025-03-26 DEVICE — STERILE UV AND BLUE LIGHT FILTERING ACRYLIC FOLDABLE ASPHERIC POSTERIOR CHAMBER INTRAOCULAR LENS
Type: IMPLANTABLE DEVICE | Site: EYE | Status: FUNCTIONAL
Brand: CLAREON

## 2025-03-26 RX ORDER — SODIUM CHLORIDE 0.9 % (FLUSH) 0.9 %
5-40 SYRINGE (ML) INJECTION PRN
Status: DISCONTINUED | OUTPATIENT
Start: 2025-03-26 | End: 2025-03-26 | Stop reason: HOSPADM

## 2025-03-26 RX ORDER — LIDOCAINE HYDROCHLORIDE 10 MG/ML
INJECTION, SOLUTION EPIDURAL; INFILTRATION; INTRACAUDAL; PERINEURAL PRN
Status: DISCONTINUED | OUTPATIENT
Start: 2025-03-26 | End: 2025-03-26 | Stop reason: ALTCHOICE

## 2025-03-26 RX ORDER — TETRACAINE HYDROCHLORIDE 5 MG/ML
SOLUTION OPHTHALMIC PRN
Status: DISCONTINUED | OUTPATIENT
Start: 2025-03-26 | End: 2025-03-26 | Stop reason: ALTCHOICE

## 2025-03-26 RX ORDER — LIDOCAINE HYDROCHLORIDE 10 MG/ML
1 INJECTION, SOLUTION EPIDURAL; INFILTRATION; INTRACAUDAL; PERINEURAL
Status: COMPLETED | OUTPATIENT
Start: 2025-03-26 | End: 2025-03-26

## 2025-03-26 RX ORDER — CYCLOPENTOLATE HYDROCHLORIDE 10 MG/ML
1 SOLUTION/ DROPS OPHTHALMIC PRN
Status: COMPLETED | OUTPATIENT
Start: 2025-03-26 | End: 2025-03-26

## 2025-03-26 RX ORDER — PREDNISOLONE ACETATE 10 MG/ML
SUSPENSION/ DROPS OPHTHALMIC PRN
Status: DISCONTINUED | OUTPATIENT
Start: 2025-03-26 | End: 2025-03-26 | Stop reason: ALTCHOICE

## 2025-03-26 RX ORDER — BALANCED SALT SOLUTION ENRICHED WITH BICARBONATE, DEXTROSE, AND GLUTATHIONE
KIT INTRAOCULAR PRN
Status: DISCONTINUED | OUTPATIENT
Start: 2025-03-26 | End: 2025-03-26 | Stop reason: ALTCHOICE

## 2025-03-26 RX ORDER — MIDAZOLAM HYDROCHLORIDE 1 MG/ML
INJECTION, SOLUTION INTRAMUSCULAR; INTRAVENOUS
Status: DISCONTINUED | OUTPATIENT
Start: 2025-03-26 | End: 2025-03-26 | Stop reason: SDUPTHER

## 2025-03-26 RX ORDER — DIPHENHYDRAMINE HYDROCHLORIDE 50 MG/ML
INJECTION, SOLUTION INTRAMUSCULAR; INTRAVENOUS
Status: DISCONTINUED | OUTPATIENT
Start: 2025-03-26 | End: 2025-03-26 | Stop reason: SDUPTHER

## 2025-03-26 RX ORDER — PROPARACAINE HYDROCHLORIDE 5 MG/ML
1 SOLUTION/ DROPS OPHTHALMIC EVERY 5 MIN PRN
Status: COMPLETED | OUTPATIENT
Start: 2025-03-26 | End: 2025-03-26

## 2025-03-26 RX ORDER — SODIUM CHLORIDE 9 MG/ML
INJECTION, SOLUTION INTRAVENOUS PRN
Status: DISCONTINUED | OUTPATIENT
Start: 2025-03-26 | End: 2025-03-26 | Stop reason: HOSPADM

## 2025-03-26 RX ORDER — KETOROLAC TROMETHAMINE 5 MG/ML
1 SOLUTION OPHTHALMIC
Status: COMPLETED | OUTPATIENT
Start: 2025-03-26 | End: 2025-03-26

## 2025-03-26 RX ORDER — SODIUM CHLORIDE 0.9 % (FLUSH) 0.9 %
5-40 SYRINGE (ML) INJECTION EVERY 12 HOURS SCHEDULED
Status: DISCONTINUED | OUTPATIENT
Start: 2025-03-26 | End: 2025-03-26 | Stop reason: HOSPADM

## 2025-03-26 RX ORDER — CIPROFLOXACIN HYDROCHLORIDE 3.5 MG/ML
SOLUTION/ DROPS TOPICAL PRN
Status: DISCONTINUED | OUTPATIENT
Start: 2025-03-26 | End: 2025-03-26 | Stop reason: ALTCHOICE

## 2025-03-26 RX ORDER — SODIUM CHLORIDE 9 MG/ML
INJECTION, SOLUTION INTRAVENOUS CONTINUOUS
Status: DISCONTINUED | OUTPATIENT
Start: 2025-03-26 | End: 2025-03-26 | Stop reason: HOSPADM

## 2025-03-26 RX ADMIN — LIDOCAINE HYDROCHLORIDE 1 ML: 10 INJECTION, SOLUTION EPIDURAL; INFILTRATION; INTRACAUDAL; PERINEURAL at 06:45

## 2025-03-26 RX ADMIN — MIDAZOLAM 1 MG: 1 INJECTION INTRAMUSCULAR; INTRAVENOUS at 08:13

## 2025-03-26 RX ADMIN — CYCLOPENTOLATE HYDROCHLORIDE 1 DROP: 10 SOLUTION OPHTHALMIC at 06:34

## 2025-03-26 RX ADMIN — Medication 1 DROP: at 06:29

## 2025-03-26 RX ADMIN — Medication 10 ML: at 08:13

## 2025-03-26 RX ADMIN — Medication 10 ML: at 08:15

## 2025-03-26 RX ADMIN — KETOROLAC TROMETHAMINE 1 DROP: 0.5 SOLUTION OPHTHALMIC at 06:28

## 2025-03-26 RX ADMIN — Medication 1 DROP: at 06:34

## 2025-03-26 RX ADMIN — DIPHENHYDRAMINE HYDROCHLORIDE 12.5 MG: 50 INJECTION INTRAMUSCULAR; INTRAVENOUS at 08:14

## 2025-03-26 RX ADMIN — Medication 1 DROP: at 06:45

## 2025-03-26 RX ADMIN — CYCLOPENTOLATE HYDROCHLORIDE 1 DROP: 10 SOLUTION OPHTHALMIC at 06:29

## 2025-03-26 RX ADMIN — CYCLOPENTOLATE HYDROCHLORIDE 1 DROP: 10 SOLUTION OPHTHALMIC at 06:45

## 2025-03-26 ASSESSMENT — ENCOUNTER SYMPTOMS
GASTROINTESTINAL NEGATIVE: 1
SHORTNESS OF BREATH: 0
COUGH: 0
TROUBLE SWALLOWING: 0
BACK PAIN: 0
ABDOMINAL PAIN: 0
SORE THROAT: 0
APNEA: 0

## 2025-03-26 ASSESSMENT — PAIN - FUNCTIONAL ASSESSMENT: PAIN_FUNCTIONAL_ASSESSMENT: NONE - DENIES PAIN

## 2025-03-26 NOTE — H&P
HISTORY and PHYSICAL  [facility]       NAME:  Dariana Juan  MRN: [id number]   YOB: 1955   Date: 3/26/2025   Age: 69 y.o.  Gender: female       COMPLAINT AND PRESENT HISTORY:     Dariana Juan is 69 y.o.,  female, presents for EYE CATARACT EMULSIFICATION INTRAOCULAR LENS IMPLANT   Primary dx: Nuclear sclerotic cataract of right eye [H25.11].    HPI  Dariana Juan is 69 y.o.,  female, here for EYE CATARACT EMULSIFICATION INTRAOCULAR LENS IMPLANT .  Nuclear sclerotic cataract of right eye [H25.11]    Denies previous eye surgery.        Using eye gtts as prescribed.    Denies blurred / cloudy vision.     Denies eye pain, redness, itching or drainage     Wears corrective lens / contacts.    Review of additional significant medical hx:  (See chart for additional detail, including current medications /see ROS for current S/S):     NPO status: NPO since MN  Anticoagulation status: ASA  last dose yesterday    Denies personal hx of blood clots.  Denies personal hx of MRSA infection.  PONV.  Denies any other personal or family hx of previous complications w/anesthesia.    PAST MEDICAL HISTORY     Past Medical History:   Diagnosis Date    BRCA1 gene mutation positive     POTT'S GENE    BRCA2 gene mutation positive     Cancer (HCC)     melonoma, left arm, melanoma back of neck    Cataract     bilateral    History of blood transfusion 2024    No reactions    Hyperlipidemia     Hypothyroid     Irritable bowel syndrome     PONV (postoperative nausea and vomiting)     Wears glasses        SURGICAL HISTORY       Past Surgical History:   Procedure Laterality Date    BLADDER SUSPENSION      prolapse repair    BREAST BIOPSY      three titaanium markers in left breast      SECTION      CHOLECYSTECTOMY      COLONOSCOPY  2019    nromal    EMG          FEMUR COMFORT NAIL INSERTION  2024    FEMUR COMFORT NAIL INSERTION Right 04/10/2024    PROPHYLACTIC INTRAMEDULLARY NAIL

## 2025-03-26 NOTE — ANESTHESIA POSTPROCEDURE EVALUATION
Department of Anesthesiology  Postprocedure Note    Patient: Dariana Juan  MRN: [id number]  YOB: 1955  Date of evaluation: 3/26/2025    Procedure Summary       Date: 03/26/25 Room / Location: [address]    Anesthesia Start: 0810 Anesthesia Stop: 0835    Procedure: EYE CATARACT EMULSIFICATION INTRAOCULAR LENS IMPLANT (Right: Eye) Diagnosis:       Nuclear sclerotic cataract of right eye      (Nuclear sclerotic cataract of right eye [H25.11])    Surgeons: Manfred Bianchi MD Responsible Provider: Harsh Curry MD    Anesthesia Type: MAC ASA Status: 2            Anesthesia Type: No value filed.    Tk Phase I:      Tk Phase II: Tk Score: 10    Anesthesia Post Evaluation    Patient location during evaluation: PACU  Patient participation: complete - patient participated  Level of consciousness: awake and alert  Airway patency: patent  Nausea & Vomiting: no vomiting  Cardiovascular status: hemodynamically stable  Respiratory status: acceptable  Hydration status: euvolemic  Comments: POST- ANESTHESIA EVALUATION       Pt Name: Dariana Juan  MRN: [id number]  YOB: 1955  Date of evaluation: 3/26/2025  Time:  10:29 AM      /88   Pulse 67   Temp 97 °F (36.1 °C) (Temporal)   Resp 16   Ht 1.746 m (5' 8.75\")   Wt 62.6 kg (138 lb)   SpO2 100%   BMI 20.53 kg/m²      Consciousness Level  Awake  Cardiopulmonary Status  Stable  Pain Adequately Treated YES  Nausea / Vomiting  NO  Adequate Hydration  YES  Anesthesia Related Complications NONE      Electronically signed by Harsh Curry MD on 3/26/2025 at 10:29 AM         Pain management: satisfactory to patient    No notable events documented.

## 2025-03-26 NOTE — ANESTHESIA PRE PROCEDURE
Department of Anesthesiology  Preprocedure Note       Name:  Dariana Juan   Age:  69 y.o.  :  1955                                          MRN:  [id number]         Date:  3/26/2025      Surgeon: Surgeon(s):  Mnafred Bianchi MD    Procedure: Procedure(s):  EYE CATARACT EMULSIFICATION INTRAOCULAR LENS IMPLANT    Medications prior to admission:   Prior to Admission medications    Medication Sig Start Date End Date Taking? Authorizing Provider   gabapentin (NEURONTIN) 300 MG capsule Take 2 capsules by mouth at bedtime. For neuropathy   Yes Janis Robertson MD   Omega-3 Fatty Acids (FISH OIL BURP-LESS PO) Take 1,340 mg by mouth daily   Yes Janis Robertson MD   aspirin 81 MG chewable tablet Take 1 tablet by mouth daily   Yes Janis Robertson MD   vitamin B-12 (CYANOCOBALAMIN) 1000 MCG tablet Take 1 tablet by mouth daily   Yes Janis Robertson MD   Teriparatide (FORTEO) 600 MCG/2.4ML SOPN injection Inject 0.08 mLs into the skin daily 24  Yes Janis Robertson MD   tolnaftate (TINACTIN) 1 % external solution Apply 1 Application topically nightly Apply topically nightly.   Yes Janis Robertson MD   atorvastatin (LIPITOR) 10 MG tablet Take 1 tablet by mouth nightly 24  Yes Ester Hobbs MD   levothyroxine (SYNTHROID) 100 MCG tablet Take 1 tablet by mouth Daily 24  Yes Ester Hobbs MD   Calcium Carb-Cholecalciferol (CALTRATE 600+D3 PO) Take 1 tablet by mouth daily   Yes Janis Robertson MD   Multiple Vitamins-Minerals (ONE A DAY WOMEN 50 PLUS PO) Take 2 tablets by mouth daily 12  Yes Janis Robertson MD   VITAMIN D PO Take 10 mcg by mouth Daily   Yes Janis Robertson MD   acetaminophen (TYLENOL) 325 MG tablet Take 2 tablets by mouth every 6 hours as needed for Pain   Yes Janis Robertson MD   nitroGLYCERIN (NITROSTAT) 0.4 MG SL tablet Place 1 tablet under the tongue every 5 minutes as needed for Chest pain up to max of 3 total doses. If

## 2025-03-26 NOTE — OP NOTE
Operative Note      Patient: Dariana Juan  YOB: 1955  MRN: [id number]    Date of Procedure: 3/26/2025    Pre-Op Diagnosis Codes:      * Nuclear sclerotic cataract of right eye [H25.11]    Post-Op Diagnosis: Same       Procedure(s):  EYE CATARACT EMULSIFICATION INTRAOCULAR LENS IMPLANT    Surgeon(s):  Manfred Bianchi MD    Assistant:   * No surgical staff found *    Anesthesia: Monitor Anesthesia Care IV sedation    Estimated Blood Loss (mL): Minimal    Complications: None    Specimens:   * No specimens in log *    Implants:  Implant Name Type Inv. Item Serial No.  Lot No. LRB No. Used Action   LENS IOL CLAREON 22.5D - [id number]  LENS IOL CLAREON 22.5D [id number] [employer]-  Right 1 Implanted         Drains: * No LDAs found *    Findings:  Infection Present At Time Of Surgery (PATOS) (choose all levels that have infection present):  No infection present  Other Findings: Cataract    Detailed Description of Procedure:   This is a 69-year-old woman having increasing difficulty because of blurred vision did not pass her 's test for at night her best corrected vision in her right eye is 2040 she has 3+ NS 2+ PSC cataract appears to understand the risk benefits alternatives of cataract surgery in her right eye and wishes to proceed.The patient was brought back into the operating suite, placed in supine position, prepped and draped in usual standard fashion. A lid speculum was placed into the right eye. Approximately 0.3 cc of plain nonpreserved lidocaine was placed onto the eye topically. A paracentesis tract was made at the 2 o'clock position with an eye knife. Then, 0.3 cc of 1% non-preserved lidocaine was placed into the anterior chamber. This was then replaced with Viscoat. Attention was directed superiorly. A conjunctival peritomy was carried out from the 11 o'clock to 1 o'clock position. Hemostasis was obtained with wet-field cautery. A

## 2025-04-08 ENCOUNTER — ANESTHESIA EVENT (OUTPATIENT)
Dept: OPERATING ROOM | Age: 70
End: 2025-04-08
Payer: MEDICARE

## 2025-04-09 ENCOUNTER — HOSPITAL ENCOUNTER (OUTPATIENT)
Age: 70
Setting detail: OUTPATIENT SURGERY
Discharge: HOME | End: 2025-04-09
Attending: OPHTHALMOLOGY | Admitting: OPHTHALMOLOGY
Payer: MEDICARE

## 2025-04-09 ENCOUNTER — ANESTHESIA (OUTPATIENT)
Dept: OPERATING ROOM | Age: 70
End: 2025-04-09
Payer: MEDICARE

## 2025-04-09 VITALS
HEART RATE: 68 BPM | TEMPERATURE: 97 F | OXYGEN SATURATION: 100 % | HEIGHT: 69 IN | WEIGHT: 138 LBS | DIASTOLIC BLOOD PRESSURE: 69 MMHG | SYSTOLIC BLOOD PRESSURE: 124 MMHG | RESPIRATION RATE: 16 BRPM | BODY MASS INDEX: 20.44 KG/M2

## 2025-04-09 PROCEDURE — 2500000003 HC RX 250 WO HCPCS: Performed by: OPHTHALMOLOGY

## 2025-04-09 PROCEDURE — 3700000001 HC ADD 15 MINUTES (ANESTHESIA): Performed by: OPHTHALMOLOGY

## 2025-04-09 PROCEDURE — 6370000000 HC RX 637 (ALT 250 FOR IP): Performed by: OPHTHALMOLOGY

## 2025-04-09 PROCEDURE — 6360000002 HC RX W HCPCS: Performed by: NURSE ANESTHETIST, CERTIFIED REGISTERED

## 2025-04-09 PROCEDURE — 3700000000 HC ANESTHESIA ATTENDED CARE: Performed by: OPHTHALMOLOGY

## 2025-04-09 PROCEDURE — 3600000002 HC SURGERY LEVEL 2 BASE: Performed by: OPHTHALMOLOGY

## 2025-04-09 PROCEDURE — 6360000002 HC RX W HCPCS: Performed by: ANESTHESIOLOGY

## 2025-04-09 PROCEDURE — 6360000002 HC RX W HCPCS: Performed by: OPHTHALMOLOGY

## 2025-04-09 PROCEDURE — 7100000010 HC PHASE II RECOVERY - FIRST 15 MIN: Performed by: OPHTHALMOLOGY

## 2025-04-09 PROCEDURE — 3600000012 HC SURGERY LEVEL 2 ADDTL 15MIN: Performed by: OPHTHALMOLOGY

## 2025-04-09 PROCEDURE — V2632 POST CHMBR INTRAOCULAR LENS: HCPCS | Performed by: OPHTHALMOLOGY

## 2025-04-09 PROCEDURE — 7100000011 HC PHASE II RECOVERY - ADDTL 15 MIN: Performed by: OPHTHALMOLOGY

## 2025-04-09 PROCEDURE — 2709999900 HC NON-CHARGEABLE SUPPLY: Performed by: OPHTHALMOLOGY

## 2025-04-09 DEVICE — STERILE UV AND BLUE LIGHT FILTERING ACRYLIC FOLDABLE ASPHERIC POSTERIOR CHAMBER INTRAOCULAR LENS
Type: IMPLANTABLE DEVICE | Site: EYE | Status: FUNCTIONAL
Brand: CLAREON

## 2025-04-09 RX ORDER — SODIUM CHLORIDE 9 MG/ML
INJECTION, SOLUTION INTRAVENOUS PRN
Status: DISCONTINUED | OUTPATIENT
Start: 2025-04-09 | End: 2025-04-09 | Stop reason: HOSPADM

## 2025-04-09 RX ORDER — SODIUM CHLORIDE 9 MG/ML
INJECTION, SOLUTION INTRAVENOUS CONTINUOUS
Status: DISCONTINUED | OUTPATIENT
Start: 2025-04-09 | End: 2025-04-09 | Stop reason: HOSPADM

## 2025-04-09 RX ORDER — SODIUM CHLORIDE 0.9 % (FLUSH) 0.9 %
5-40 SYRINGE (ML) INJECTION EVERY 12 HOURS SCHEDULED
Status: DISCONTINUED | OUTPATIENT
Start: 2025-04-09 | End: 2025-04-09 | Stop reason: HOSPADM

## 2025-04-09 RX ORDER — MIDAZOLAM HYDROCHLORIDE 1 MG/ML
INJECTION, SOLUTION INTRAMUSCULAR; INTRAVENOUS
Status: DISCONTINUED | OUTPATIENT
Start: 2025-04-09 | End: 2025-04-09 | Stop reason: SDUPTHER

## 2025-04-09 RX ORDER — LIDOCAINE HYDROCHLORIDE 10 MG/ML
1 INJECTION, SOLUTION EPIDURAL; INFILTRATION; INTRACAUDAL; PERINEURAL
Status: COMPLETED | OUTPATIENT
Start: 2025-04-09 | End: 2025-04-09

## 2025-04-09 RX ORDER — LIDOCAINE HYDROCHLORIDE 10 MG/ML
INJECTION, SOLUTION EPIDURAL; INFILTRATION; INTRACAUDAL; PERINEURAL PRN
Status: DISCONTINUED | OUTPATIENT
Start: 2025-04-09 | End: 2025-04-09 | Stop reason: ALTCHOICE

## 2025-04-09 RX ORDER — SODIUM CHLORIDE 0.9 % (FLUSH) 0.9 %
5-40 SYRINGE (ML) INJECTION PRN
Status: DISCONTINUED | OUTPATIENT
Start: 2025-04-09 | End: 2025-04-09 | Stop reason: HOSPADM

## 2025-04-09 RX ORDER — TETRACAINE HYDROCHLORIDE 5 MG/ML
SOLUTION OPHTHALMIC PRN
Status: DISCONTINUED | OUTPATIENT
Start: 2025-04-09 | End: 2025-04-09 | Stop reason: ALTCHOICE

## 2025-04-09 RX ORDER — CIPROFLOXACIN HYDROCHLORIDE 3.5 MG/ML
SOLUTION/ DROPS TOPICAL PRN
Status: DISCONTINUED | OUTPATIENT
Start: 2025-04-09 | End: 2025-04-09 | Stop reason: ALTCHOICE

## 2025-04-09 RX ORDER — PROPARACAINE HYDROCHLORIDE 5 MG/ML
1 SOLUTION/ DROPS OPHTHALMIC EVERY 5 MIN PRN
Status: COMPLETED | OUTPATIENT
Start: 2025-04-09 | End: 2025-04-09

## 2025-04-09 RX ORDER — CYCLOPENTOLATE HYDROCHLORIDE 10 MG/ML
1 SOLUTION/ DROPS OPHTHALMIC PRN
Status: COMPLETED | OUTPATIENT
Start: 2025-04-09 | End: 2025-04-09

## 2025-04-09 RX ORDER — KETOROLAC TROMETHAMINE 5 MG/ML
1 SOLUTION OPHTHALMIC
Status: COMPLETED | OUTPATIENT
Start: 2025-04-09 | End: 2025-04-09

## 2025-04-09 RX ORDER — PREDNISOLONE ACETATE 10 MG/ML
SUSPENSION/ DROPS OPHTHALMIC PRN
Status: DISCONTINUED | OUTPATIENT
Start: 2025-04-09 | End: 2025-04-09 | Stop reason: ALTCHOICE

## 2025-04-09 RX ADMIN — KETOROLAC TROMETHAMINE 1 DROP: 5 SOLUTION/ DROPS OPHTHALMIC at 07:02

## 2025-04-09 RX ADMIN — CYCLOPENTOLATE HYDROCHLORIDE 1 DROP: 10 SOLUTION OPHTHALMIC at 07:02

## 2025-04-09 RX ADMIN — MIDAZOLAM 1 MG: 1 INJECTION INTRAMUSCULAR; INTRAVENOUS at 08:15

## 2025-04-09 RX ADMIN — Medication 1 DROP: at 07:15

## 2025-04-09 RX ADMIN — CYCLOPENTOLATE HYDROCHLORIDE 1 DROP: 10 SOLUTION OPHTHALMIC at 07:14

## 2025-04-09 RX ADMIN — LIDOCAINE HYDROCHLORIDE 1 ML: 10 INJECTION, SOLUTION EPIDURAL; INFILTRATION; INTRACAUDAL; PERINEURAL at 07:36

## 2025-04-09 RX ADMIN — Medication 1 DROP: at 07:00

## 2025-04-09 RX ADMIN — Medication 1 DROP: at 07:32

## 2025-04-09 RX ADMIN — CYCLOPENTOLATE HYDROCHLORIDE 1 DROP: 10 SOLUTION OPHTHALMIC at 07:32

## 2025-04-09 RX ADMIN — SODIUM CHLORIDE, PRESERVATIVE FREE 10 ML: 5 INJECTION INTRAVENOUS at 07:36

## 2025-04-09 ASSESSMENT — PAIN - FUNCTIONAL ASSESSMENT
PAIN_FUNCTIONAL_ASSESSMENT: 0-10
PAIN_FUNCTIONAL_ASSESSMENT: NONE - DENIES PAIN

## 2025-04-09 ASSESSMENT — ENCOUNTER SYMPTOMS
RESPIRATORY NEGATIVE: 1
GASTROINTESTINAL NEGATIVE: 1

## 2025-04-09 NOTE — ANESTHESIA PRE PROCEDURE
Department of Anesthesiology  Preprocedure Note       Name:  Dariana Juan   Age:  69 y.o.  :  1955                                          MRN:  [id number]         Date:  2025      Surgeon: Surgeon(s):  Manfred Bianchi MD    Procedure: Procedure(s):  EYE CATARACT EMULSIFICATION INTRAOCULAR LENS IMPLANT LEFT    Medications prior to admission:   Prior to Admission medications    Medication Sig Start Date End Date Taking? Authorizing Provider   gabapentin (NEURONTIN) 300 MG capsule Take 2 capsules by mouth at bedtime. For neuropathy   Yes Janis Robertson MD   Omega-3 Fatty Acids (FISH OIL BURP-LESS PO) Take 1,340 mg by mouth daily   Yes Janis Robertson MD   aspirin 81 MG chewable tablet Take 1 tablet by mouth daily   Yes Janis Robertson MD   vitamin B-12 (CYANOCOBALAMIN) 1000 MCG tablet Take 1 tablet by mouth daily   Yes Janis Robertson MD   Teriparatide (FORTEO) 600 MCG/2.4ML SOPN injection Inject 0.08 mLs into the skin daily 24  Yes Janis Robertson MD   atorvastatin (LIPITOR) 10 MG tablet Take 1 tablet by mouth nightly 24  Yes Ester Hobbs MD   levothyroxine (SYNTHROID) 100 MCG tablet Take 1 tablet by mouth Daily 24  Yes Ester Hobbs MD   Calcium Carb-Cholecalciferol (CALTRATE 600+D3 PO) Take 1 tablet by mouth daily   Yes Janis Robertson MD   Multiple Vitamins-Minerals (ONE A DAY WOMEN 50 PLUS PO) Take 2 tablets by mouth daily 12  Yes Janis Robertson MD   VITAMIN D PO Take 10 mcg by mouth Daily   Yes Janis Robertson MD   acetaminophen (TYLENOL) 325 MG tablet Take 2 tablets by mouth every 6 hours as needed for Pain   Yes Janis Robertson MD   tolnaftate (TINACTIN) 1 % external solution Apply 1 Application topically nightly Apply topically nightly.    Janis Robertson MD   nitroGLYCERIN (NITROSTAT) 0.4 MG SL tablet Place 1 tablet under the tongue every 5 minutes as needed for Chest pain up to max of 3 total doses. If

## 2025-04-09 NOTE — OP NOTE
Operative Note      Patient: Dariana Juan  YOB: 1955  MRN: [id number]    Date of Procedure: 4/9/2025    Pre-Op Diagnosis Codes:      * Nuclear sclerotic cataract of left eye [H25.12]    Post-Op Diagnosis: Same       Procedure(s):  EYE CATARACT EMULSIFICATION INTRAOCULAR LENS IMPLANT LEFT    Surgeon(s):  Manfred Bianchi MD    Assistant:   * No surgical staff found *    Anesthesia: Monitor Anesthesia Care IV sedation    Estimated Blood Loss (mL): Minimal    Complications: None    Specimens:   * No specimens in log *    Implants:  Implant Name Type Inv. Item Serial No.  Lot No. LRB No. Used Action   LENS CLAREON IOL 21.0D - [id number]  LENS CLAREON IOL 21.0D [id number] [id number]  Left 1 Implanted         Drains: * No LDAs found *    Findings:  Infection Present At Time Of Surgery (PATOS) (choose all levels that have infection present):  No infection present  Other Findings: Cataract    Detailed Description of Procedure:   This is a 69-year-old woman having increasing difficulty with poor vision in her right eye did not pass her 's test for driving at night has problems with glare her best corrected vision in her left eye is 20/40 plus she has 2+ NS 2+ PSC in her left eye appears understand the risk benefits alternatives of cataract surgery in her left eye and wishes to proceed.The patient was brought back into the operating suite, placed in supine position, prepped and draped in usual standard fashion. A lid speculum was placed into the left eye. Approximately 0.3 cc of plain nonpreserved lidocaine was placed onto the eye topically. A paracentesis tract was made at the 2 o'clock position with an eye knife. Then, 0.3 cc of 1% non-preserved lidocaine was placed into the anterior chamber. This was then replaced with Viscoat. Attention was directed superiorly. A conjunctival peritomy was carried out from the 11 o'clock to 1 o'clock position. Hemostasis was obtained with

## 2025-04-09 NOTE — ANESTHESIA POSTPROCEDURE EVALUATION
Department of Anesthesiology  Postprocedure Note    Patient: Dariana Juan  MRN: [id number]  YOB: 1955  Date of evaluation: 4/9/2025    Procedure Summary       Date: 04/09/25 Room / Location: [address]    Anesthesia Start: 0813 Anesthesia Stop: 0838    Procedure: EYE CATARACT EMULSIFICATION INTRAOCULAR LENS IMPLANT LEFT (Left: Eye) Diagnosis:       Nuclear sclerotic cataract of left eye      (Nuclear sclerotic cataract of left eye [H25.12])    Surgeons: Manfred Bianchi MD Responsible Provider: Kathy Baca MD    Anesthesia Type: MAC ASA Status: 3            Anesthesia Type: No value filed.    Tk Phase I: Tk Score: 10    Tk Phase II: Tk Score: 10    Anesthesia Post Evaluation    Comments: POST- ANESTHESIA EVALUATION       Pt Name: Dariana Juan  MRN: [id number]  YOB: 1955  Date of evaluation: 4/9/2025  Time:  1:30 PM      /69   Pulse 68   Temp 97 °F (36.1 °C)   Resp 16   Ht 1.746 m (5' 8.75\")   Wt 62.6 kg (138 lb)   SpO2 100%   BMI 20.53 kg/m²      Consciousness Level  Awake  Cardiopulmonary Status  Stable  Pain Adequately Treated YES  Nausea / Vomiting  NO  Adequate Hydration  YES  Anesthesia Related Complications NONE      Electronically signed by Kathy Baca MD on 4/9/2025 at 1:30 PM           No notable events documented.

## 2025-04-09 NOTE — H&P
HISTORY and PHYSICAL  [facility]       NAME:  Dariana Juan  MRN: [id number]   YOB: 1955   Date: 2025   Age: 69 y.o.  Gender: female       COMPLAINT AND PRESENT HISTORY:     Dariana Juan is 69 y.o.,  female, presents for EYE CATARACT EMULSIFICATION INTRAOCULAR LENS IMPLANT LEFT   Primary dx: Nuclear sclerotic cataract of left eye [H25.12].    HPI:  Dariana Juan is 69 y.o.,  female,complain of clouded, blurred or dim vision,trouble seeing at night, sensitivity to light and glare, need for brighter light for reading and other activities, and  seeing \"halos\" around lights. Pt denies left eye pain, redness, itching or drainage,  Pt denies  previous left  eye surgery, Pt using eye gtts as prescribed.  Pt denies  family history of glaucoma or blindness.  Medical history reviewed.  Pt denies recent or current chest pain/pressure, palpitations, SOB, recent URI, fever or chills.   NPO p MN. Pt took her am medication with sip of water    Pt is on ASA , last dose yesterday.     PAST MEDICAL HISTORY     Past Medical History:   Diagnosis Date    BRCA1 gene mutation positive     POTT'S GENE    BRCA2 gene mutation positive     Cancer (HCC)     melonoma, left arm, melanoma back of neck    Cataract     bilateral    History of blood transfusion 2024    No reactions    Hyperlipidemia     Hypothyroid     Irritable bowel syndrome     PONV (postoperative nausea and vomiting)     Wears glasses        SURGICAL HISTORY       Past Surgical History:   Procedure Laterality Date    BLADDER SUSPENSION      prolapse repair    BREAST BIOPSY      three titaanium markers in left breast      SECTION      CHOLECYSTECTOMY      COLONOSCOPY  2019    nromal    EMG          EYE SURGERY Right 3/26/2025    EYE CATARACT EMULSIFICATION INTRAOCULAR LENS IMPLANT performed by Manfred Bianchi MD at [facility] OR    FEMUR COMFORT NAIL INSERTION  2024    FEMUR COMFORT NAIL INSERTION Right 04/10/2024

## 2025-06-27 ENCOUNTER — HOSPITAL ENCOUNTER (OUTPATIENT)
Dept: WOMENS IMAGING | Age: 70
Discharge: HOME | End: 2025-06-29
Payer: MEDICARE

## 2025-06-27 DIAGNOSIS — M81.0 OSTEOPOROSIS, POST-MENOPAUSAL: ICD-10-CM

## 2025-06-27 PROCEDURE — 77080 DXA BONE DENSITY AXIAL: CPT

## (undated) DEVICE — GLOVE SURG SZ 75 CRM LTX FREE POLYISOPRENE POLYMER BEAD ANTI

## (undated) DEVICE — SWABSTICK MEDICATED 10% POVIDONE IOD PVP TRIPE ANTISEP PREP 3 PER PK

## (undated) DEVICE — MARKER,SKIN,WI/RULER AND LABELS: Brand: MEDLINE

## (undated) DEVICE — PACK CATARACT SURGI+CARE CUSTOM

## (undated) DEVICE — GOWN,AURORA,NONREINFORCED,LARGE: Brand: MEDLINE

## (undated) DEVICE — BIT DRL L485MM DIA4.5/6.5MM TI STP L QUIK CPL FOR INFERIOR 03010078] DEPUY SYNTHES USA]

## (undated) DEVICE — Z DISCONTINUED USE 2220295 SUTURE VICRYL SZ 0 L18IN ABSRB UD L36MM CT-1 1/2 CIR J840D

## (undated) DEVICE — K-WIRES, DOUBLE TROCAR, .028-INCH (.7MM) DIAMETER, 9-INCH LONG
Type: IMPLANTABLE DEVICE | Site: FEMUR | Status: NON-FUNCTIONAL
Brand: KEY SURGICAL K-WIRES AND STEINMANN PINS
Removed: 2024-04-08

## (undated) DEVICE — THE MONARCH® "B" CARTRIDGE IS A SINGLE-USE POLYPROPYLENE CARTRIDGE FOR POSTERIOR CHAMBER IOL DELIVERY: Brand: MONARCH® II

## (undated) DEVICE — STOCKINETTE,IMPERVIOUS,12X48,STERILE: Brand: MEDLINE

## (undated) DEVICE — PENCIL ELECSURG BPLR 18 GA DISP

## (undated) DEVICE — GARMENT,MEDLINE,DVT,INT,CALF,MED, GEN2: Brand: MEDLINE

## (undated) DEVICE — STRAP ARMBRD W1.5XL32IN FOAM STR YET SFT W/ HK AND LOOP

## (undated) DEVICE — TOWEL,OR,DSP,ST,WHITE,DLX,2/PK,40PK/CS: Brand: MEDLINE

## (undated) DEVICE — BIT DRL L145MM DIA4.2MM NONSTERILE 3 FLUT NDL PNT QUIK CPL

## (undated) DEVICE — SUTURE VICRYL SZ 2-0 L18IN ABSRB UD CT-1 L36MM 1/2 CIR J839D

## (undated) DEVICE — GOWN,SIRUS,NONRNF,XLN/XL,20/CS: Brand: MEDLINE

## (undated) DEVICE — CORD,CAUTERY,BIPOLAR,STERILE: Brand: MEDLINE

## (undated) DEVICE — ROD RMR L950MM DIA2.5MM W/ EXTN BALL TIP

## (undated) DEVICE — Device

## (undated) DEVICE — SVMMC ORTH SPL DRP PK

## (undated) DEVICE — SOLUTION IRRIG 500ML BAL SALT FLX BG BSS

## (undated) DEVICE — DRESSING FOAM W4XL4IN AG SIL FACE BORD IONIC ANTIMIC ADH

## (undated) DEVICE — GLOVE ORANGE PI 8   MSG9080

## (undated) DEVICE — 1016 S-DRAPE IRRIG POUCH 10/BOX: Brand: STERI-DRAPE™

## (undated) DEVICE — ROD RM L1150MM DIA2.5MM TI W/ EXTN BALL TIP FOR IM NAIL

## (undated) DEVICE — C-ARMOR C-ARM EQUIPMENT COVERS CLEAR STERILE UNIVERSAL FIT 12 PER CASE: Brand: C-ARMOR

## (undated) DEVICE — DRAPE,U/ SHT,SPLIT,PLAS,STERIL: Brand: MEDLINE

## (undated) DEVICE — SURGICAL SUCTION CONNECTING TUBE WITH MALE CONNECTOR AND SUCTION CLAMP, 2 FT. LONG (.6 M), 5 MM I.D.: Brand: CONMED

## (undated) DEVICE — STRAP,POSITIONING,KNEE/BODY,FOAM,4X60": Brand: MEDLINE

## (undated) DEVICE — 1000 S-DRAPE TOWEL DRAPE 10/BX: Brand: STERI-DRAPE™

## (undated) DEVICE — ROPE TRACTION STERILE 72IN

## (undated) DEVICE — GUIDEWIRE ORTH L400MM DIA3.2MM FOR TFN

## (undated) DEVICE — GLOVE ORTHO 8   MSG9480